# Patient Record
Sex: FEMALE | Race: WHITE | NOT HISPANIC OR LATINO | Employment: FULL TIME | URBAN - NONMETROPOLITAN AREA
[De-identification: names, ages, dates, MRNs, and addresses within clinical notes are randomized per-mention and may not be internally consistent; named-entity substitution may affect disease eponyms.]

---

## 2018-09-17 ENCOUNTER — HOSPITAL ENCOUNTER (EMERGENCY)
Facility: HOSPITAL | Age: 23
Discharge: HOME | End: 2018-09-17
Attending: EMERGENCY MEDICINE | Admitting: EMERGENCY MEDICINE

## 2018-09-17 VITALS
OXYGEN SATURATION: 96 % | RESPIRATION RATE: 16 BRPM | SYSTOLIC BLOOD PRESSURE: 169 MMHG | DIASTOLIC BLOOD PRESSURE: 95 MMHG | TEMPERATURE: 99.6 F | WEIGHT: 293 LBS | HEART RATE: 112 BPM | BODY MASS INDEX: 48.82 KG/M2 | HEIGHT: 65 IN

## 2018-09-17 DIAGNOSIS — J10.1 INFLUENZA A: Primary | ICD-10-CM

## 2018-09-17 LAB
BACTERIA UR QL AUTO: ABNORMAL /HPF
BILIRUB UR QL STRIP: NEGATIVE
CLARITY UR: ABNORMAL
COLOR UR: YELLOW
FLUAV AG NPH QL: POSITIVE
FLUBV AG NPH QL IA: NEGATIVE
GLUCOSE UR STRIP-MCNC: NEGATIVE MG/DL
HGB UR QL STRIP.AUTO: ABNORMAL
HYALINE CASTS UR QL AUTO: ABNORMAL /LPF
KETONES UR QL STRIP: NEGATIVE
LEUKOCYTE ESTERASE UR QL STRIP.AUTO: ABNORMAL
MUCOUS THREADS URNS QL MICRO: ABNORMAL /HPF
NITRITE UR QL STRIP: NEGATIVE
PH UR STRIP.AUTO: 5.5 [PH] (ref 5–8)
PROT UR QL STRIP: ABNORMAL
RBC # UR: ABNORMAL /HPF
REF LAB TEST METHOD: ABNORMAL
S PYO AG THROAT QL: NEGATIVE
SP GR UR STRIP: >=1.03 (ref 1–1.03)
SQUAMOUS #/AREA URNS HPF: ABNORMAL /HPF
TRICHOMONAS #/AREA URNS HPF: ABNORMAL /HPF
UROBILINOGEN UR QL STRIP: ABNORMAL
WBC UR QL AUTO: ABNORMAL /HPF

## 2018-09-17 PROCEDURE — 87880 STREP A ASSAY W/OPTIC: CPT | Performed by: NURSE PRACTITIONER

## 2018-09-17 PROCEDURE — 87804 INFLUENZA ASSAY W/OPTIC: CPT | Performed by: NURSE PRACTITIONER

## 2018-09-17 PROCEDURE — 87081 CULTURE SCREEN ONLY: CPT | Performed by: NURSE PRACTITIONER

## 2018-09-17 PROCEDURE — 81001 URINALYSIS AUTO W/SCOPE: CPT | Performed by: NURSE PRACTITIONER

## 2018-09-17 PROCEDURE — 99283 EMERGENCY DEPT VISIT LOW MDM: CPT

## 2018-09-17 RX ORDER — ACETAMINOPHEN 325 MG/1
975 TABLET ORAL ONCE
Status: COMPLETED | OUTPATIENT
Start: 2018-09-17 | End: 2018-09-17

## 2018-09-17 RX ORDER — ONDANSETRON 4 MG/1
4 TABLET, ORALLY DISINTEGRATING ORAL EVERY 6 HOURS PRN
Qty: 20 TABLET | Refills: 0 | OUTPATIENT
Start: 2018-09-17 | End: 2020-02-24

## 2018-09-17 RX ORDER — HYDROXYZINE HYDROCHLORIDE 10 MG/1
25 TABLET, FILM COATED ORAL 3 TIMES DAILY
COMMUNITY
End: 2020-02-24

## 2018-09-17 RX ORDER — BUSPIRONE HYDROCHLORIDE 10 MG/1
10 TABLET ORAL 3 TIMES DAILY
COMMUNITY
End: 2020-11-15

## 2018-09-17 RX ORDER — AMLODIPINE BESYLATE 10 MG/1
10 TABLET ORAL DAILY
COMMUNITY
End: 2021-04-20 | Stop reason: SDUPTHER

## 2018-09-17 RX ORDER — OSELTAMIVIR PHOSPHATE 75 MG/1
75 CAPSULE ORAL 2 TIMES DAILY
Qty: 10 CAPSULE | Refills: 0 | OUTPATIENT
Start: 2018-09-17 | End: 2020-02-24

## 2018-09-17 RX ORDER — ONDANSETRON 4 MG/1
4 TABLET, ORALLY DISINTEGRATING ORAL ONCE
Status: COMPLETED | OUTPATIENT
Start: 2018-09-17 | End: 2018-09-17

## 2018-09-17 RX ADMIN — ACETAMINOPHEN 975 MG: 325 TABLET, FILM COATED ORAL at 14:28

## 2018-09-17 RX ADMIN — ONDANSETRON 4 MG: 4 TABLET, ORALLY DISINTEGRATING ORAL at 14:27

## 2018-09-18 NOTE — ED PROVIDER NOTES
Subjective   History of Present Illness  Presents with fever, chills, body aches, cough, nausea for the past two days. Just returned from [place]. Significant other has the same symptoms. Denies chest pain or shortness of breath. No vomiting or diarrhea.  Review of Systems   All other systems reviewed and are negative.      Past Medical History:   Diagnosis Date   • Anxiety    • Depression    • Hypertension        No Known Allergies    Past Surgical History:   Procedure Laterality Date   • CHOLECYSTECTOMY     • DENTAL PROCEDURE     • TONSILLECTOMY         History reviewed. No pertinent family history.    Social History     Social History   • Marital status: Single     Social History Main Topics   • Smoking status: Never Smoker   • Alcohol use Yes      Comment: social   • Drug use: No     Other Topics Concern   • Not on file           Objective   Physical Exam   Constitutional: She is oriented to person, place, and time.   Morbidly obese, in no acute distress   HENT:   Head: Normocephalic and atraumatic.   Nares red and swollen, no sinus pain, posterior pharynx is red with clear drainage.   Eyes: Pupils are equal, round, and reactive to light. Conjunctivae and EOM are normal.   Neck: Normal range of motion. Neck supple.   Cardiovascular: Normal rate.    Pulmonary/Chest: Effort normal and breath sounds normal. No respiratory distress. She has no wheezes. She has no rales. She exhibits no tenderness.   Abdominal: Soft. Bowel sounds are normal.   Musculoskeletal: Normal range of motion.   Neurological: She is alert and oriented to person, place, and time.   Skin: Skin is warm and dry. Capillary refill takes less than 2 seconds.   Psychiatric: She has a normal mood and affect. Her behavior is normal. Judgment and thought content normal.   Nursing note and vitals reviewed.      Procedures           ED Course      Given Tylenol and Zofran. Influenza A is positive. Discharged with Zofran and Tamiflu. No work until fever free  for 24 hours. Force fluids. Tylenol and Motrin as needed for fever and pain.            [facility]      Final diagnoses:   Influenza A            Christina Wright, APRN  09/18/18 [id number]

## 2018-09-19 LAB — BACTERIA SPEC AEROBE CULT: NORMAL

## 2019-08-30 ENCOUNTER — HOSPITAL ENCOUNTER (EMERGENCY)
Facility: HOSPITAL | Age: 24
Discharge: HOME | End: 2019-08-30
Attending: EMERGENCY MEDICINE | Admitting: EMERGENCY MEDICINE

## 2019-08-30 ENCOUNTER — APPOINTMENT (OUTPATIENT)
Dept: GENERAL RADIOLOGY | Facility: HOSPITAL | Age: 24
End: 2019-08-30

## 2019-08-30 VITALS
HEIGHT: 65 IN | TEMPERATURE: 97.5 F | OXYGEN SATURATION: 100 % | SYSTOLIC BLOOD PRESSURE: 174 MMHG | WEIGHT: 281 LBS | RESPIRATION RATE: 16 BRPM | DIASTOLIC BLOOD PRESSURE: 105 MMHG | BODY MASS INDEX: 46.82 KG/M2 | HEART RATE: 98 BPM

## 2019-08-30 DIAGNOSIS — M25.512 ACUTE PAIN OF LEFT SHOULDER: ICD-10-CM

## 2019-08-30 DIAGNOSIS — S23.9XXA THORACIC SPRAIN: ICD-10-CM
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
## 2019-08-30 PROCEDURE — 73030 X-RAY EXAM OF SHOULDER: CPT

## 2019-08-30 PROCEDURE — 72070 X-RAY EXAM THORAC SPINE 2VWS: CPT

## 2019-08-30 PROCEDURE — 99283 EMERGENCY DEPT VISIT LOW MDM: CPT

## 2019-08-30 RX ORDER — CYCLOBENZAPRINE HCL 10 MG
10 TABLET ORAL ONCE
Status: COMPLETED | OUTPATIENT
Start: 2019-08-30 | End: 2019-08-30

## 2019-08-30 RX ORDER — IBUPROFEN 800 MG/1
800 TABLET ORAL ONCE
Status: COMPLETED | OUTPATIENT
Start: 2019-08-30 | End: 2019-08-30

## 2019-08-30 RX ORDER — METOPROLOL TARTRATE 50 MG/1
50 TABLET, FILM COATED ORAL DAILY
COMMUNITY
End: 2021-04-20

## 2019-08-30 RX ORDER — CYCLOBENZAPRINE HCL 10 MG
10 TABLET ORAL 3 TIMES DAILY PRN
Qty: 15 TABLET | Refills: 0 | Status: SHIPPED | OUTPATIENT
Start: 2019-08-30 | End: 2020-11-15

## 2019-08-30 RX ADMIN — CYCLOBENZAPRINE HYDROCHLORIDE 10 MG: 10 TABLET, FILM COATED ORAL at 20:19

## 2019-08-30 RX ADMIN — IBUPROFEN 800 MG: 800 TABLET ORAL at 20:19

## 2019-10-13 ENCOUNTER — HOSPITAL ENCOUNTER (EMERGENCY)
Facility: HOSPITAL | Age: 24
Discharge: HOME | End: 2019-10-13
Attending: EMERGENCY MEDICINE | Admitting: EMERGENCY MEDICINE

## 2019-10-13 VITALS
RESPIRATION RATE: 18 BRPM | TEMPERATURE: 98.6 F | BODY MASS INDEX: 45.35 KG/M2 | HEART RATE: 88 BPM | DIASTOLIC BLOOD PRESSURE: 97 MMHG | SYSTOLIC BLOOD PRESSURE: 144 MMHG | OXYGEN SATURATION: 98 % | WEIGHT: 272.2 LBS | HEIGHT: 65 IN

## 2019-10-13 DIAGNOSIS — S05.01XA ABRASION OF RIGHT CORNEA, INITIAL ENCOUNTER: Primary | ICD-10-CM

## 2019-10-13 PROCEDURE — 99283 EMERGENCY DEPT VISIT LOW MDM: CPT

## 2019-10-13 RX ORDER — IBUPROFEN 800 MG/1
800 TABLET ORAL EVERY 8 HOURS PRN
Qty: 60 TABLET | Refills: 0 | Status: SHIPPED | OUTPATIENT
Start: 2019-10-13 | End: 2020-11-15 | Stop reason: SDUPTHER

## 2019-10-13 RX ORDER — MONTELUKAST SODIUM 10 MG/1
10 TABLET ORAL NIGHTLY
COMMUNITY
End: 2021-04-20 | Stop reason: SDUPTHER

## 2019-10-13 RX ORDER — MOXIFLOXACIN 5 MG/ML
1 SOLUTION/ DROPS OPHTHALMIC 3 TIMES DAILY
COMMUNITY
End: 2020-02-24

## 2019-10-13 RX ORDER — IBUPROFEN 800 MG/1
800 TABLET ORAL ONCE
Status: COMPLETED | OUTPATIENT
Start: 2019-10-13 | End: 2019-10-13

## 2019-10-13 RX ORDER — TETRACAINE HYDROCHLORIDE 5 MG/ML
2 SOLUTION OPHTHALMIC ONCE
Status: COMPLETED | OUTPATIENT
Start: 2019-10-13 | End: 2019-10-13

## 2019-10-13 RX ORDER — HYDROCODONE BITARTRATE AND ACETAMINOPHEN 5; 325 MG/1; MG/1
1 TABLET ORAL ONCE
Status: COMPLETED | OUTPATIENT
Start: 2019-10-13 | End: 2019-10-13

## 2019-10-13 RX ORDER — HYDROCODONE BITARTRATE AND ACETAMINOPHEN 5; 325 MG/1; MG/1
1 TABLET ORAL EVERY 6 HOURS PRN
Qty: 6 TABLET | Refills: 0 | Status: SHIPPED | OUTPATIENT
Start: 2019-10-13 | End: 2020-11-15

## 2019-10-13 RX ORDER — CETIRIZINE HYDROCHLORIDE 10 MG/1
10 TABLET ORAL DAILY
COMMUNITY
End: 2021-04-20 | Stop reason: SDUPTHER

## 2019-10-13 RX ADMIN — FLUORESCEIN SODIUM 1 STRIP: 1 STRIP OPHTHALMIC at 00:59

## 2019-10-13 RX ADMIN — TETRACAINE HYDROCHLORIDE 2 DROP: 5 SOLUTION OPHTHALMIC at 00:59

## 2019-10-13 RX ADMIN — IBUPROFEN 800 MG: 800 TABLET ORAL at 01:13

## 2019-10-13 RX ADMIN — HYDROCODONE BITARTRATE AND ACETAMINOPHEN 1 TABLET: 5; 325 TABLET ORAL at 01:13

## 2019-10-13 NOTE — ED PROVIDER NOTES
Subjective   24-year-old female presenting with eye pain.  She states that a few days ago were contacted for the first time in a long time.  After taking them out she had pain in her right eye.  She was seen by her ophthalmologist yesterday and diagnosed with a corneal abrasion.  She continues to have pain tonight so came in for evaluation.  No vision loss, nausea, vomiting or other complaints.  She was given moxifloxacin and has been using it as prescribed.            Review of Systems   Constitutional: Negative.    HENT: Negative.    Eyes: Positive for pain, discharge and redness. Negative for photophobia and visual disturbance.   Respiratory: Negative.    Cardiovascular: Negative.    Gastrointestinal: Negative.    Genitourinary: Negative.    Musculoskeletal: Negative.    Skin: Negative.    Neurological: Negative.    Psychiatric/Behavioral: Negative.        Past Medical History:   Diagnosis Date   • Anxiety    • Depression    • Hypertension        No Known Allergies    Past Surgical History:   Procedure Laterality Date   • CHOLECYSTECTOMY     • DENTAL PROCEDURE     • TONSILLECTOMY         History reviewed. No pertinent family history.    Social History     Socioeconomic History   • Marital status: Single     Spouse name: Not on file   • Number of children: Not on file   • Years of education: Not on file   • Highest education level: Not on file   Tobacco Use   • Smoking status: Never Smoker   Substance and Sexual Activity   • Alcohol use: Yes     Comment: social   • Drug use: No           Objective   Physical Exam   Constitutional: She is oriented to person, place, and time. She appears well-developed and well-nourished. No distress.   HENT:   Head: Normocephalic and atraumatic.   Right Ear: External ear normal.   Left Ear: External ear normal.   Nose: Nose normal.   Mouth/Throat: Oropharynx is clear and moist.   Eyes: EOM are normal. Pupils are equal, round, and reactive to light.   Mild conjunctival injection on  the right, large corneal abrasion noted over the midpoint of the pupil, Jaja negative, no dendritic lesions or ulcerative appearing lesions, immediate relief of pain with instillation of tetracaine, left eye normal   Neck: Normal range of motion. Neck supple.   Cardiovascular: Normal rate, regular rhythm, normal heart sounds and intact distal pulses.   Pulmonary/Chest: Effort normal and breath sounds normal. No respiratory distress.   Abdominal: Soft. Bowel sounds are normal. She exhibits no distension. There is no tenderness. There is no rebound and no guarding.   Musculoskeletal: Normal range of motion. She exhibits no edema, tenderness or deformity.   Neurological: She is alert and oriented to person, place, and time.   Skin: Skin is warm and dry. No rash noted.   Psychiatric: She has a normal mood and affect. Her behavior is normal.   Nursing note and vitals reviewed.      Procedures           ED Course              MDM  Number of Diagnoses or Management Options  Abrasion of right cornea, initial encounter:   Diagnosis management comments: 22-year-old female with eye pain, corneal abrasion.  Well-developed, well-nourished obese young lady in no distress with exam as above.  Exam is consistent with a large centrally located corneal abrasion.  No other concerning findings on exam. Will give oral analgesia and recommended keeping her appointment with her ophthalmologist. Encouraged her to keep using the antibiotics as prescribed. She is comfortable with and understanding of the plan.    DDX: corneal abrasion       Final diagnoses:   Abrasion of right cornea, initial encounter              Negro Calderon MD  10/13/19 0110

## 2020-11-15 ENCOUNTER — HOSPITAL ENCOUNTER (EMERGENCY)
Facility: HOSPITAL | Age: 25
Discharge: HOME | End: 2020-11-15
Attending: STUDENT IN AN ORGANIZED HEALTH CARE EDUCATION/TRAINING PROGRAM | Admitting: STUDENT IN AN ORGANIZED HEALTH CARE EDUCATION/TRAINING PROGRAM

## 2020-11-15 ENCOUNTER — APPOINTMENT (OUTPATIENT)
Dept: GENERAL RADIOLOGY | Facility: HOSPITAL | Age: 25
End: 2020-11-15

## 2020-11-15 VITALS
SYSTOLIC BLOOD PRESSURE: 131 MMHG | HEART RATE: 74 BPM | RESPIRATION RATE: 20 BRPM | DIASTOLIC BLOOD PRESSURE: 92 MMHG | HEIGHT: 65 IN | BODY MASS INDEX: 41.48 KG/M2 | TEMPERATURE: 98 F | WEIGHT: 249 LBS | OXYGEN SATURATION: 100 %

## 2020-11-15 DIAGNOSIS — S93.401A SPRAIN OF RIGHT ANKLE, UNSPECIFIED LIGAMENT, INITIAL ENCOUNTER: Primary | ICD-10-CM

## 2020-11-15 PROCEDURE — 99283 EMERGENCY DEPT VISIT LOW MDM: CPT

## 2020-11-15 PROCEDURE — 73630 X-RAY EXAM OF FOOT: CPT

## 2020-11-15 PROCEDURE — 73610 X-RAY EXAM OF ANKLE: CPT

## 2020-11-15 RX ORDER — FLUOXETINE HYDROCHLORIDE 20 MG/1
20 CAPSULE ORAL DAILY
COMMUNITY
End: 2021-04-20 | Stop reason: ALTCHOICE

## 2020-11-15 RX ORDER — IBUPROFEN 800 MG/1
800 TABLET ORAL EVERY 8 HOURS PRN
Qty: 60 TABLET | Refills: 0 | Status: SHIPPED | OUTPATIENT
Start: 2020-11-15 | End: 2022-05-06 | Stop reason: HOSPADM

## 2020-11-15 RX ORDER — SUMATRIPTAN 25 MG/1
25 TABLET, FILM COATED ORAL
COMMUNITY
End: 2020-12-22

## 2020-11-15 RX ORDER — HYDROCODONE BITARTRATE AND ACETAMINOPHEN 7.5; 325 MG/1; MG/1
1 TABLET ORAL ONCE
Status: COMPLETED | OUTPATIENT
Start: 2020-11-15 | End: 2020-11-15

## 2020-11-15 RX ORDER — HYDROXYZINE PAMOATE 25 MG/1
25 CAPSULE ORAL 3 TIMES DAILY PRN
COMMUNITY
End: 2021-04-20 | Stop reason: SDUPTHER

## 2020-11-15 RX ADMIN — HYDROCODONE BITARTRATE AND ACETAMINOPHEN 1 TABLET: 7.5; 325 TABLET ORAL at 18:07

## 2020-11-15 NOTE — ED PROVIDER NOTES
Subjective   History of Present Illness  This is a 25-year-old female comes in today complaining of right ankle pain.  She states she stepped off the porch and twisted her right ankle.  She denies any other injuries.  She is unable to bear weight.  Review of Systems   Constitutional: Negative.    HENT: Negative.    Eyes: Negative.    Respiratory: Negative.    Cardiovascular: Negative.    Gastrointestinal: Negative.    Genitourinary: Negative.    Musculoskeletal: Positive for arthralgias and joint swelling.   Skin: Negative.    Neurological: Negative.    Psychiatric/Behavioral: Negative.        Past Medical History:   Diagnosis Date   • Anxiety    • Depression    • Hypertension        Allergies   Allergen Reactions   • Topamax [Topiramate] Rash     Rash, tingling and numnbess in bilateral feet.        Past Surgical History:   Procedure Laterality Date   • CHOLECYSTECTOMY     • DENTAL PROCEDURE     • TONSILLECTOMY         History reviewed. No pertinent family history.    Social History     Socioeconomic History   • Marital status: Single     Spouse name: Not on file   • Number of children: Not on file   • Years of education: Not on file   • Highest education level: Not on file   Tobacco Use   • Smoking status: Never Smoker   Substance and Sexual Activity   • Alcohol use: Yes     Comment: social   • Drug use: No           Objective   Physical Exam  Vitals signs and nursing note reviewed.   Constitutional:       Appearance: Normal appearance. She is obese.   Neurological:      Mental Status: She is alert.     GEN: No acute distress  Head: Normocephalic, atraumatic  Eyes: Pupils equal round reactive to light  ENT: Posterior pharynx normal in appearance, oral mucosa is moist  Chest: Nontender to palpation  Cardiovascular: Regular rate  Lungs: Clear to auscultation bilaterally  Abdomen: Soft, nontender, nondistended, no peritoneal signs  Extremities: No edema, normal appearance, tender right ankle, limited range of motion  due to pain.  Neuro: GCS 15  Psych: Mood and affect are appropriate      Procedures           ED Course                                           MDM  Number of Diagnoses or Management Options     Amount and/or Complexity of Data Reviewed  Tests in the radiology section of CPT®: ordered and reviewed  Review and summarize past medical records: yes  Discuss the patient with other providers: yes  Independent visualization of images, tracings, or specimens: yes    Risk of Complications, Morbidity, and/or Mortality  Presenting problems: low  Diagnostic procedures: low  Management options: low        Final diagnoses:   Sprain of right ankle, unspecified ligament, initial encounter            Paula Ozuna, APRN  11/15/20 1811

## 2020-12-22 ENCOUNTER — PROCEDURE VISIT (OUTPATIENT)
Dept: OBSTETRICS AND GYNECOLOGY | Facility: CLINIC | Age: 25
End: 2020-12-22

## 2020-12-22 VITALS
SYSTOLIC BLOOD PRESSURE: 110 MMHG | BODY MASS INDEX: 43.49 KG/M2 | DIASTOLIC BLOOD PRESSURE: 68 MMHG | WEIGHT: 261 LBS | HEIGHT: 65 IN

## 2020-12-22 DIAGNOSIS — Z12.4 SCREENING FOR CERVICAL CANCER: ICD-10-CM

## 2020-12-22 DIAGNOSIS — Z01.419 ENCOUNTER FOR GYNECOLOGICAL EXAMINATION WITHOUT ABNORMAL FINDING: Primary | ICD-10-CM

## 2020-12-22 DIAGNOSIS — Z30.011 ENCOUNTER FOR INITIAL PRESCRIPTION OF CONTRACEPTIVE PILLS: ICD-10-CM

## 2020-12-22 PROCEDURE — 99385 PREV VISIT NEW AGE 18-39: CPT | Performed by: PHYSICIAN ASSISTANT

## 2020-12-22 NOTE — PATIENT INSTRUCTIONS
Encourage self breast exam monthly  Regular exercise  Discussed Kyleena IUD. Patient may call in the future if she decides to switch from ocp

## 2020-12-22 NOTE — PROGRESS NOTES
Subjective   Chief Complaint   Patient presents with   • Gynecologic Exam     Patient is here for annual and pap smear       Valery Hernandez is a 25 y.o. year old new patient  presenting to be seen for her annual gynecological exam.   She has no complaints or concerns.  She is currently using Tri-Sprintec OCPs and desires refills for now.  She has been considering alternative birth control and is interested in an IUD but is not wanting to change right now. She is hypertensive and well controlled on medication.   She has regular monthly bleeds on her Tri-Sprintec and ..Patient's last menstrual period was 2020 (approximate).  She declines STI screening  Sexually active with one female partner and monogamous.    Past Medical History:   Diagnosis Date   • Anxiety    • Depression    • Hypertension    • Polycystic ovary syndrome    • Urinary tract infection         Current Outpatient Medications:   •  amLODIPine (NORVASC) 10 MG tablet, Take 10 mg by mouth Daily., Disp: , Rfl:   •  cetirizine (zyrTEC) 10 MG tablet, Take 10 mg by mouth Daily., Disp: , Rfl:   •  FLUoxetine (PROzac) 20 MG capsule, Take 20 mg by mouth Daily., Disp: , Rfl:   •  hydrOXYzine pamoate (VISTARIL) 25 MG capsule, Take 25 mg by mouth 3 (Three) Times a Day As Needed for Itching., Disp: , Rfl:   •  ibuprofen (ADVIL,MOTRIN) 800 MG tablet, Take 1 tablet by mouth Every 8 (Eight) Hours As Needed for Mild Pain  or Moderate Pain ., Disp: 60 tablet, Rfl: 0  •  metoprolol tartrate (LOPRESSOR) 50 MG tablet, Take 50 mg by mouth Daily., Disp: , Rfl:   •  montelukast (SINGULAIR) 10 MG tablet, Take 10 mg by mouth Every Night., Disp: , Rfl:   •  traZODone (DESYREL) 100 MG tablet, Take 100 mg by mouth every night at bedtime., Disp: , Rfl:   •  TRI-SPRINTEC 0.18/0.215/0.25 MG-35 MCG per tablet, Take 1 tablet by mouth Daily., Disp: , Rfl:    Allergies   Allergen Reactions   • Topamax [Topiramate] Rash     Rash, tingling and numnbess in bilateral feet.      "  Past Surgical History:   Procedure Laterality Date   • CHOLECYSTECTOMY     • DENTAL PROCEDURE     • TONSILLECTOMY        Social History     Socioeconomic History   • Marital status: Single     Spouse name: Not on file   • Number of children: Not on file   • Years of education: Not on file   • Highest education level: Not on file   Social Needs   • Financial resource strain: Not hard at all   • Food insecurity     Worry: Never true     Inability: Not on file   • Transportation needs     Medical: No     Non-medical: No   Tobacco Use   • Smoking status: Never Smoker   • Smokeless tobacco: Never Used   Substance and Sexual Activity   • Alcohol use: Yes     Comment: social   • Drug use: No   • Sexual activity: Yes     Partners: Female     Birth control/protection: OCP   Lifestyle   • Physical activity     Days per week: 0 days     Minutes per session: 0 min   • Stress: Only a little      Family History   Problem Relation Age of Onset   • Hyperlipidemia Father    • Hypertension Paternal Grandfather    • Heart disease Paternal Grandmother    • Hyperlipidemia Paternal Grandmother        Review of Systems   Constitutional: Negative for chills, diaphoresis and fever.        Weight gain   Gastrointestinal: Negative for constipation, diarrhea, nausea and vomiting.   Musculoskeletal: Positive for arthralgias.   Psychiatric/Behavioral: Positive for sleep disturbance.        Mood swings   All other systems reviewed and are negative.          Objective   /68   Ht 165.1 cm (65\")   Wt 118 kg (261 lb)   LMP 12/08/2020 (Approximate)   Breastfeeding No   BMI 43.43 kg/m²     Physical Exam  Constitutional:       Appearance: Normal appearance. She is well-groomed. She is obese.   Eyes:      General: Lids are normal.      Extraocular Movements: Extraocular movements intact.      Conjunctiva/sclera: Conjunctivae normal.   Chest:      Breasts: Breasts are symmetrical.         Right: No inverted nipple, mass, nipple discharge, " skin change or tenderness.         Left: No inverted nipple, mass, nipple discharge, skin change or tenderness.   Abdominal:      General: There is no distension.      Palpations: Abdomen is soft.      Tenderness: There is no abdominal tenderness. There is no guarding.   Genitourinary:     Labia:         Right: No rash, tenderness or lesion.         Left: No rash, tenderness or lesion.       Urethra: No prolapse, urethral pain, urethral swelling or urethral lesion.      Vagina: No vaginal discharge, erythema, tenderness, bleeding or lesions.      Cervix: No cervical motion tenderness, discharge, friability, lesion, erythema, cervical bleeding or eversion.      Uterus: Not enlarged and not tender.       Adnexa:         Right: No mass, tenderness or fullness.          Left: No mass, tenderness or fullness.        Comments: Pap done  Musculoskeletal: Normal range of motion.   Skin:     General: Skin is warm and dry.      Findings: No lesion or rash.   Neurological:      Mental Status: She is alert and oriented to person, place, and time.   Psychiatric:         Attention and Perception: Attention normal.         Mood and Affect: Mood normal.         Speech: Speech normal.         Behavior: Behavior is cooperative.         Thought Content: Thought content normal.              Assessment and Plan  Diagnoses and all orders for this visit:    1. Encounter for gynecological examination without abnormal finding (Primary)    2. Screening for cervical cancer  -     Liquid-based Pap Smear, Screening; Future    3. Encounter for initial prescription of contraceptive pills      Patient Instructions   Encourage self breast exam monthly  Regular exercise  Discussed Kyleena IUD. Patient may call in the future if she decides to switch from ocp             This note was electronically signed.    Tori Hernandes PA-C   December 22, 2020

## 2021-01-05 DIAGNOSIS — Z12.4 SCREENING FOR CERVICAL CANCER: ICD-10-CM

## 2021-01-05 RX ORDER — METRONIDAZOLE 500 MG/1
500 TABLET ORAL 2 TIMES DAILY
Qty: 14 TABLET | Refills: 0 | Status: SHIPPED | OUTPATIENT
Start: 2021-01-05 | End: 2021-01-12

## 2021-04-20 ENCOUNTER — OFFICE VISIT (OUTPATIENT)
Dept: INTERNAL MEDICINE | Facility: CLINIC | Age: 26
End: 2021-04-20

## 2021-04-20 VITALS
HEART RATE: 88 BPM | DIASTOLIC BLOOD PRESSURE: 84 MMHG | WEIGHT: 291 LBS | OXYGEN SATURATION: 98 % | TEMPERATURE: 97.1 F | HEIGHT: 65 IN | SYSTOLIC BLOOD PRESSURE: 122 MMHG | BODY MASS INDEX: 48.48 KG/M2

## 2021-04-20 DIAGNOSIS — Z00.00 PREVENTATIVE HEALTH CARE: ICD-10-CM

## 2021-04-20 DIAGNOSIS — E66.01 CLASS 3 SEVERE OBESITY DUE TO EXCESS CALORIES WITH SERIOUS COMORBIDITY AND BODY MASS INDEX (BMI) OF 45.0 TO 49.9 IN ADULT (HCC): ICD-10-CM

## 2021-04-20 DIAGNOSIS — I10 ESSENTIAL HYPERTENSION: ICD-10-CM

## 2021-04-20 DIAGNOSIS — R73.03 PREDIABETES: ICD-10-CM

## 2021-04-20 DIAGNOSIS — A04.8 H. PYLORI INFECTION: ICD-10-CM

## 2021-04-20 DIAGNOSIS — Z00.00 ANNUAL PHYSICAL EXAM: ICD-10-CM

## 2021-04-20 DIAGNOSIS — K21.9 GASTROESOPHAGEAL REFLUX DISEASE, UNSPECIFIED WHETHER ESOPHAGITIS PRESENT: ICD-10-CM

## 2021-04-20 DIAGNOSIS — Z11.59 NEED FOR HEPATITIS C SCREENING TEST: ICD-10-CM

## 2021-04-20 DIAGNOSIS — R63.5 WEIGHT GAIN: ICD-10-CM

## 2021-04-20 DIAGNOSIS — F41.8 DEPRESSION WITH ANXIETY: ICD-10-CM

## 2021-04-20 DIAGNOSIS — R53.83 FATIGUE, UNSPECIFIED TYPE: ICD-10-CM

## 2021-04-20 DIAGNOSIS — Z76.89 ENCOUNTER TO ESTABLISH CARE: Primary | ICD-10-CM

## 2021-04-20 DIAGNOSIS — E28.2 PCOS (POLYCYSTIC OVARIAN SYNDROME): ICD-10-CM

## 2021-04-20 PROCEDURE — 99204 OFFICE O/P NEW MOD 45 MIN: CPT | Performed by: PHYSICIAN ASSISTANT

## 2021-04-20 RX ORDER — MONTELUKAST SODIUM 10 MG/1
10 TABLET ORAL NIGHTLY
Qty: 90 TABLET | Refills: 3 | Status: SHIPPED | OUTPATIENT
Start: 2021-04-20 | End: 2022-01-20 | Stop reason: SDUPTHER

## 2021-04-20 RX ORDER — CETIRIZINE HYDROCHLORIDE 10 MG/1
10 TABLET ORAL NIGHTLY
Qty: 90 TABLET | Refills: 3 | Status: SHIPPED | OUTPATIENT
Start: 2021-04-20 | End: 2022-01-20 | Stop reason: SDUPTHER

## 2021-04-20 RX ORDER — AMLODIPINE BESYLATE 10 MG/1
10 TABLET ORAL DAILY
Qty: 90 TABLET | Refills: 3 | Status: SHIPPED | OUTPATIENT
Start: 2021-04-20 | End: 2022-01-20 | Stop reason: SDUPTHER

## 2021-04-20 RX ORDER — FLUTICASONE PROPIONATE 50 MCG
2 SPRAY, SUSPENSION (ML) NASAL DAILY
Qty: 18.2 ML | Refills: 11 | Status: SHIPPED | OUTPATIENT
Start: 2021-04-20 | End: 2022-01-20 | Stop reason: SDUPTHER

## 2021-04-20 RX ORDER — TRAZODONE HYDROCHLORIDE 150 MG/1
150 TABLET ORAL NIGHTLY
Qty: 90 TABLET | Refills: 3 | Status: SHIPPED | OUTPATIENT
Start: 2021-04-20 | End: 2022-01-20 | Stop reason: SDUPTHER

## 2021-04-20 RX ORDER — HYDROXYZINE PAMOATE 25 MG/1
25 CAPSULE ORAL NIGHTLY PRN
Qty: 90 CAPSULE | Refills: 3 | Status: SHIPPED | OUTPATIENT
Start: 2021-04-20 | End: 2021-06-24 | Stop reason: SDUPTHER

## 2021-04-20 RX ORDER — FAMOTIDINE 40 MG/1
40 TABLET, FILM COATED ORAL 2 TIMES DAILY PRN
Qty: 180 TABLET | Refills: 3 | Status: SHIPPED | OUTPATIENT
Start: 2021-04-20 | End: 2022-01-20 | Stop reason: SDUPTHER

## 2021-04-20 RX ORDER — DULOXETIN HYDROCHLORIDE 30 MG/1
30 CAPSULE, DELAYED RELEASE ORAL DAILY
Qty: 30 CAPSULE | Refills: 2 | Status: SHIPPED | OUTPATIENT
Start: 2021-04-20 | End: 2021-05-24 | Stop reason: SDUPTHER

## 2021-04-20 RX ORDER — METOPROLOL SUCCINATE 100 MG/1
100 TABLET, EXTENDED RELEASE ORAL DAILY
Qty: 90 TABLET | Refills: 3 | Status: SHIPPED | OUTPATIENT
Start: 2021-04-20 | End: 2022-01-20 | Stop reason: SDUPTHER

## 2021-04-20 RX ORDER — TRAZODONE HYDROCHLORIDE 150 MG/1
TABLET ORAL
COMMUNITY
Start: 2021-03-30 | End: 2021-04-20 | Stop reason: SDUPTHER

## 2021-04-20 RX ORDER — METOPROLOL SUCCINATE 100 MG/1
TABLET, EXTENDED RELEASE ORAL
COMMUNITY
Start: 2021-03-30 | End: 2021-04-20 | Stop reason: SDUPTHER

## 2021-04-20 RX ORDER — NORGESTIMATE AND ETHINYL ESTRADIOL 7DAYSX3 28
1 KIT ORAL DAILY
Qty: 28 TABLET | Refills: 11 | Status: SHIPPED | OUTPATIENT
Start: 2021-04-20 | End: 2021-07-12

## 2021-04-20 NOTE — PROGRESS NOTES
Subjective   Valery Hernandez is a 26 y.o. female and is here to establish care for a comprehensive physical exam. The patient reports problems - depression and anxiety. She is a .    HPI: Today she reports bothersome depression and anxiety. She experienced around 4 months of very bothersome bilateral leg cramps. She was on prozac until around 2 weeks ago when she ran out of the prescription for around 1 week and noticed the leg cramps went away but she then resumed the prozac and cramps returned so she again discontinued it. Prozac was controlling depression and anxiety well. She has not tried any other antidepressant medications. Hydroxyzine mainly helps her sleep. She tried Buspar for around 1 year which was not very effective.     PCOS- had lost a lot of weight with diet and exercise but then gained most of the weight back due to Covid-19 shut down and then breaking her ankle and tearing plantar fascia (now in boot). She has never been treated for PCOS but states she was diagnosed through labs by GYN.    HTN treated with metoprolol and amlodipine. HTN diagnosed around age 21. Family history of early hypertension on her dad's side.     Migraines without aura.  Migraines no worse since starting OCP.    Frequent heartburn, sometimes for weeks straight. She has tried several OTC medications which do not control symptoms.         Health Habits:  Eye exam within last 2 years? Yes, yesterday  Dental exam every 6 months? Yes   Exercise habits: unable due to left foot and ankle problem, in boot  Healthy diet? sometimes    The ASCVD Risk score (Jewell JENNIFER Jr., et al., 2013) failed to calculate for the following reasons:    The 2013 ASCVD risk score is only valid for ages 40 to 79        Do you take any herbs or supplements that were not prescribed by a doctor? no  Are you taking calcium supplements? No  Are you taking aspirin daily? No     History:  LMP: No LMP recorded.  Menopause: No  Last pap date:  2020  Abnormal pap? no  Family history of breast or ovarian cancer: no         OB History    Para Term  AB Living   0 0 0 0 0 0   SAB TAB Ectopic Molar Multiple Live Births   0 0 0 0 0 0      reports being sexually active and has had partner(s) who are Female. She reports using the following method of birth control/protection: OCP.        The following portions of the patient's history were reviewed and updated as appropriate: She  has a past medical history of Anemia, Anxiety, Depression, GERD (gastroesophageal reflux disease), Hypertension, Migraine, Polycystic ovary syndrome, and Urinary tract infection.  She does not have any pertinent problems on file.  She  has a past surgical history that includes Dental surgery; Cholecystectomy; and Tonsillectomy.  Her family history includes Arthritis in an other family member; Diabetes in an other family member; Heart attack in an other family member; Heart disease in her paternal grandmother; Hyperlipidemia in her father, paternal grandmother, and another family member; Hypertension in her father, paternal grandfather, and another family member; Obesity in an other family member.  She  reports that she has never smoked. She has never used smokeless tobacco. She reports current alcohol use. She reports that she does not use drugs.  Current Outpatient Medications   Medication Sig Dispense Refill   • amLODIPine (NORVASC) 10 MG tablet Take 1 tablet by mouth Daily. 90 tablet 3   • cetirizine (zyrTEC) 10 MG tablet Take 1 tablet by mouth Every Night. 90 tablet 3   • hydrOXYzine pamoate (VISTARIL) 25 MG capsule Take 1 capsule by mouth At Night As Needed for Anxiety (sleep). 90 capsule 3   • ibuprofen (ADVIL,MOTRIN) 800 MG tablet Take 1 tablet by mouth Every 8 (Eight) Hours As Needed for Mild Pain  or Moderate Pain . 60 tablet 0   • metoprolol succinate XL (TOPROL-XL) 100 MG 24 hr tablet Take 1 tablet by mouth Daily. 90 tablet 3   • montelukast (SINGULAIR) 10  MG tablet Take 1 tablet by mouth Every Night. 90 tablet 3   • traZODone (DESYREL) 150 MG tablet Take 1 tablet by mouth Every Night. 90 tablet 3   • Tri-Sprintec 0.18/0.215/0.25 MG-35 MCG per tablet Take 1 tablet by mouth Daily. 28 tablet 11   • DULoxetine (CYMBALTA) 30 MG capsule Take 1 capsule by mouth Daily. 30 capsule 2   • famotidine (Pepcid) 40 MG tablet Take 1 tablet by mouth 2 (Two) Times a Day As Needed for Indigestion or Heartburn. 180 tablet 3   • fluticasone (Flonase) 50 MCG/ACT nasal spray 2 sprays into the nostril(s) as directed by provider Daily. 18.2 mL 11     No current facility-administered medications for this visit.       Review of Systems  Do you have pain that bothers you in your daily life? yes, current foot issue.    Review of Systems   Constitutional: Positive for fatigue and unexpected weight gain. Negative for activity change, appetite change, chills, diaphoresis, fever and unexpected weight loss.   HENT: Negative for congestion, dental problem, ear pain, mouth sores, postnasal drip, rhinorrhea, sinus pressure, sneezing, sore throat, swollen glands, trouble swallowing and voice change.    Eyes: Negative for blurred vision, double vision, pain, discharge, redness, itching and visual disturbance.   Respiratory: Negative for cough, choking, chest tightness, shortness of breath and wheezing.    Cardiovascular: Positive for leg swelling (left, injury related). Negative for chest pain and palpitations.   Gastrointestinal: Positive for indigestion (frequent). Negative for abdominal distention, abdominal pain, blood in stool, constipation, diarrhea, nausea, rectal pain, vomiting and GERD.   Endocrine: Positive for cold intolerance. Negative for heat intolerance, polydipsia, polyphagia and polyuria.   Genitourinary: Positive for menstrual problem (irregular without OCP). Negative for breast discharge, breast lump, breast pain, decreased libido, decreased urine volume, difficulty urinating, dysuria,  "flank pain, frequency, hematuria, pelvic pain, urgency, vaginal bleeding and vaginal pain.   Musculoskeletal: Positive for arthralgias (left ankle and foot). Negative for back pain, gait problem, myalgias and neck pain.   Skin: Negative for color change, rash and skin lesions.   Allergic/Immunologic: Positive for environmental allergies. Negative for immunocompromised state.   Neurological: Positive for weakness (generalized, fatigue) and headache. Negative for dizziness, tremors, facial asymmetry, speech difficulty, light-headedness, numbness, memory problem and confusion.   Hematological: Negative for adenopathy. Does not bruise/bleed easily.   Psychiatric/Behavioral: Positive for dysphoric mood, sleep disturbance and depressed mood. Negative for agitation, behavioral problems, decreased concentration, hallucinations, self-injury, suicidal ideas, negative for hyperactivity and stress. The patient is nervous/anxious.          Objective   /84   Pulse 88   Temp 97.1 °F (36.2 °C)   Ht 165.1 cm (65\")   Wt 132 kg (291 lb)   SpO2 98%   BMI 48.42 kg/m²     Physical Exam  Vitals and nursing note reviewed.   Constitutional:       General: She is not in acute distress.     Appearance: She is well-developed. She is morbidly obese. She is not diaphoretic.   HENT:      Head: Normocephalic and atraumatic.      Right Ear: Ear canal and external ear normal. There is no impacted cerumen.      Left Ear: Ear canal and external ear normal. There is no impacted cerumen.      Ears:      Comments: Bilateral TM effusions, left greater than right.  Eyes:      General: No scleral icterus.     Extraocular Movements: Extraocular movements intact.      Conjunctiva/sclera: Conjunctivae normal.      Pupils: Pupils are equal, round, and reactive to light.   Neck:      Thyroid: No thyromegaly.   Cardiovascular:      Rate and Rhythm: Normal rate and regular rhythm.      Heart sounds: Normal heart sounds. No murmur heard.   No friction " rub. No gallop.    Pulmonary:      Effort: Pulmonary effort is normal. No respiratory distress.      Breath sounds: Normal breath sounds. No wheezing, rhonchi or rales.   Chest:      Chest wall: No tenderness.   Abdominal:      General: Bowel sounds are normal. There is no distension.      Palpations: Abdomen is soft. There is no mass.      Tenderness: There is no abdominal tenderness. There is no right CVA tenderness, left CVA tenderness or rebound.   Musculoskeletal:         General: No tenderness or deformity. Normal range of motion.      Cervical back: Normal range of motion and neck supple. No rigidity or tenderness.      Right lower leg: No edema.      Left lower leg: No edema.      Comments: Left foot and lower leg in boot.   Lymphadenopathy:      Cervical: No cervical adenopathy.   Skin:     General: Skin is warm and dry.      Capillary Refill: Capillary refill takes less than 2 seconds.      Coloration: Skin is not pale.      Findings: No rash.   Neurological:      Mental Status: She is alert and oriented to person, place, and time.      Cranial Nerves: No cranial nerve deficit.      Sensory: No sensory deficit.      Deep Tendon Reflexes: Reflexes normal.   Psychiatric:         Attention and Perception: Attention and perception normal.         Mood and Affect: Mood is anxious and depressed.         Speech: Speech normal.         Behavior: Behavior normal.         Thought Content: Thought content normal.         Cognition and Memory: Cognition and memory normal.         Judgment: Judgment normal.            Assessment/Plan   Healthy female exam.       1.    Diagnosis Plan   1. Encounter to establish care     2. Annual physical exam  Lipid Panel   3. Class 3 severe obesity due to excess calories with serious comorbidity and body mass index (BMI) of 45.0 to 49.9 in adult (CMS/[facility])  Hemoglobin A1c    Patient's (Body mass index is 48.42 kg/m².) indicates that they are morbidly obese (BMI > 40 or > 35 with  obesity - related health condition) with obesity-related health conditions that include hypertension and GERD . Obesity is worsening. BMI is is above average; BMI management plan is completed. We discussed low calorie, low carb based diet program, portion control, increasing exercise and joining a fitness center or start home based exercise program.      4. Depression with anxiety  TSH    T4, Free      Begin daily: DULoxetine (CYMBALTA) 30 MG capsule  Risks, benefits and potential side effects of medication reviewed and patient agrees to use.     Continue nightly as needed: traZODone (DESYREL) 150 MG tablet    Continue nightly as needed: hydrOXYzine pamoate (VISTARIL) 25 MG capsule    Patient's depression is recurrent and is moderate without psychosis. Their depression is currently active and the condition is Initiating new treatment. This will be reassessed in 4 weeks. F/U as described:patient was prescribed an antidepressant medicine and patient depression is being managed by their pcp.     5. Gastroesophageal reflux disease, unspecified whether esophagitis present  Helicobacter Pylori, IgA IgG IgM    Begin twice daily as needed: Famotidine (Pepcid) 40 MG tablet     6. PCOS (polycystic ovarian syndrome)  Testosterone    Insulin, Total    Hemoglobin A1c    Comprehensive Metabolic Panel    We will consider initiation of Metformin after review of labs.     7. Weight gain  TSH    T4, Free     8. Fatigue, unspecified type  Comprehensive Metabolic Panel    Vitamin B12    Vitamin D 25 Hydroxy    CBC (No Diff)     9. Preventative health care  Lipid Panel     10. Need for hepatitis C screening test  Hepatitis C Antibody     11. Essential hypertension   well-controlled, continue current medications: Metoprolol succinate XL (TOPROL-XL) 100 MG 24 hr tablet    amLODIPine (NORVASC) 10 MG tablet         2. Patient Counseling:  --Nutrition: Stressed importance of moderation in sodium/caffeine intake, saturated fat and  cholesterol, caloric balance, sufficient intake of fresh fruits, vegetables, fiber, calcium, iron, and 1 g folate supplementation if of childbearing age.   --Discussed the issue of calcium supplement, and the daily use of baby aspirin if applicable.             --Mammogram recommended every 2 years from age 40-49 and yearly beginning at age 50.  --Exercise: Stressed the importance of regular exercise.   --Substance Abuse: Discussed cessation/primary prevention of tobacco (if applicable), alcohol, or other drug use (if applicable); driving or other dangerous activities under the influence; availability of treatment for abuse.    --Sexuality: Discussed sexually transmitted diseases, partner selection, use of condoms, avoidance of unintended pregnancy  and contraceptive alternatives.   --Injury prevention: Discussed safety belts, safety helmets, smoke detector, smoking near bedding or upholstery.   --Dental health: Discussed importance of regular tooth brushing, flossing, and dental visits every 6 months.  --Immunizations reviewed.  --Discussed benefits of screening colonoscopy (if applicable).  --After hours service discussed with patient.    3. Discussed the patient's BMI with her.  The BMI is above average; BMI management plan is completed  4. Return in about 1 month (around 5/20/2021) for Next scheduled follow up.         NIYAH Johnson  04/20/2021  11:26 EDT

## 2021-04-21 LAB
25(OH)D3+25(OH)D2 SERPL-MCNC: 23.2 NG/ML (ref 30–100)
ALBUMIN SERPL-MCNC: 3.9 G/DL (ref 3.5–5.2)
ALBUMIN/GLOB SERPL: 1.3 G/DL
ALP SERPL-CCNC: 87 U/L (ref 39–117)
ALT SERPL-CCNC: 27 U/L (ref 1–33)
AST SERPL-CCNC: 11 U/L (ref 1–32)
BILIRUB SERPL-MCNC: <0.2 MG/DL (ref 0–1.2)
BUN SERPL-MCNC: 16 MG/DL (ref 6–20)
BUN/CREAT SERPL: 24.6 (ref 7–25)
CALCIUM SERPL-MCNC: 9.7 MG/DL (ref 8.6–10.5)
CHLORIDE SERPL-SCNC: 101 MMOL/L (ref 98–107)
CHOLEST SERPL-MCNC: 188 MG/DL (ref 0–200)
CO2 SERPL-SCNC: 26.1 MMOL/L (ref 22–29)
CREAT SERPL-MCNC: 0.65 MG/DL (ref 0.57–1)
ERYTHROCYTE [DISTWIDTH] IN BLOOD BY AUTOMATED COUNT: 13 % (ref 12.3–15.4)
GLOBULIN SER CALC-MCNC: 2.9 GM/DL
GLUCOSE SERPL-MCNC: 117 MG/DL (ref 65–99)
H PYLORI IGA SER-ACNC: <9 UNITS (ref 0–8.9)
H PYLORI IGG SER IA-ACNC: 0.16 INDEX VALUE (ref 0–0.79)
H PYLORI IGM SER-ACNC: 13.7 UNITS (ref 0–8.9)
HBA1C MFR BLD: 5.7 % (ref 4.8–5.6)
HCT VFR BLD AUTO: 38.7 % (ref 34–46.6)
HCV AB S/CO SERPL IA: <0.1 S/CO RATIO (ref 0–0.9)
HDLC SERPL-MCNC: 81 MG/DL (ref 40–60)
HGB BLD-MCNC: 13 G/DL (ref 12–15.9)
INSULIN SERPL-ACNC: 42.4 UIU/ML (ref 2.6–24.9)
LDLC SERPL CALC-MCNC: 92 MG/DL (ref 0–100)
MCH RBC QN AUTO: 27.6 PG (ref 26.6–33)
MCHC RBC AUTO-ENTMCNC: 33.6 G/DL (ref 31.5–35.7)
MCV RBC AUTO: 82.2 FL (ref 79–97)
PLATELET # BLD AUTO: 357 10*3/MM3 (ref 140–450)
POTASSIUM SERPL-SCNC: 4.2 MMOL/L (ref 3.5–5.2)
PROT SERPL-MCNC: 6.8 G/DL (ref 6–8.5)
RBC # BLD AUTO: 4.71 10*6/MM3 (ref 3.77–5.28)
SODIUM SERPL-SCNC: 137 MMOL/L (ref 136–145)
T4 FREE SERPL-MCNC: 1.17 NG/DL (ref 0.93–1.7)
TESTOST SERPL-MCNC: <3 NG/DL (ref 8–48)
TRIGL SERPL-MCNC: 83 MG/DL (ref 0–150)
TSH SERPL DL<=0.005 MIU/L-ACNC: 0.87 UIU/ML (ref 0.27–4.2)
VIT B12 SERPL-MCNC: 521 PG/ML (ref 211–946)
VLDLC SERPL CALC-MCNC: 15 MG/DL (ref 5–40)
WBC # BLD AUTO: 11.56 10*3/MM3 (ref 3.4–10.8)

## 2021-04-22 PROBLEM — A04.8 H. PYLORI INFECTION: Status: ACTIVE | Noted: 2021-04-22

## 2021-04-22 PROBLEM — E55.9 VITAMIN D DEFICIENCY: Status: ACTIVE | Noted: 2021-04-22

## 2021-04-22 PROBLEM — R73.03 PREDIABETES: Status: ACTIVE | Noted: 2021-04-22

## 2021-04-22 RX ORDER — CLARITHROMYCIN 500 MG/1
500 TABLET, COATED ORAL 2 TIMES DAILY
Qty: 28 TABLET | Refills: 0 | Status: SHIPPED | OUTPATIENT
Start: 2021-04-22 | End: 2021-05-06

## 2021-04-22 RX ORDER — AMOXICILLIN 500 MG/1
1000 CAPSULE ORAL 2 TIMES DAILY
Qty: 56 CAPSULE | Refills: 0 | Status: SHIPPED | OUTPATIENT
Start: 2021-04-22 | End: 2021-05-06

## 2021-04-22 RX ORDER — OMEPRAZOLE 20 MG/1
20 CAPSULE, DELAYED RELEASE ORAL 2 TIMES DAILY
Qty: 28 CAPSULE | Refills: 0 | Status: SHIPPED | OUTPATIENT
Start: 2021-04-22 | End: 2021-05-06

## 2021-04-22 RX ORDER — METFORMIN HYDROCHLORIDE 500 MG/1
500 TABLET, EXTENDED RELEASE ORAL
Qty: 90 TABLET | Refills: 3 | Status: SHIPPED | OUTPATIENT
Start: 2021-04-22 | End: 2021-06-22

## 2021-05-24 ENCOUNTER — PATIENT MESSAGE (OUTPATIENT)
Dept: INTERNAL MEDICINE | Facility: CLINIC | Age: 26
End: 2021-05-24

## 2021-05-24 ENCOUNTER — OFFICE VISIT (OUTPATIENT)
Dept: INTERNAL MEDICINE | Facility: CLINIC | Age: 26
End: 2021-05-24

## 2021-05-24 VITALS
TEMPERATURE: 98.2 F | OXYGEN SATURATION: 100 % | DIASTOLIC BLOOD PRESSURE: 84 MMHG | BODY MASS INDEX: 48.15 KG/M2 | SYSTOLIC BLOOD PRESSURE: 116 MMHG | HEART RATE: 107 BPM | WEIGHT: 289 LBS | HEIGHT: 65 IN

## 2021-05-24 DIAGNOSIS — F41.8 DEPRESSION WITH ANXIETY: ICD-10-CM

## 2021-05-24 DIAGNOSIS — A04.8 H. PYLORI INFECTION: ICD-10-CM

## 2021-05-24 DIAGNOSIS — E66.01 CLASS 3 SEVERE OBESITY DUE TO EXCESS CALORIES WITH SERIOUS COMORBIDITY AND BODY MASS INDEX (BMI) OF 45.0 TO 49.9 IN ADULT (HCC): ICD-10-CM

## 2021-05-24 DIAGNOSIS — Z71.89 ENCOUNTER FOR PRE-BARIATRIC SURGERY COUNSELING AND EDUCATION: ICD-10-CM

## 2021-05-24 DIAGNOSIS — D72.829 LEUKOCYTOSIS, UNSPECIFIED TYPE: Primary | ICD-10-CM

## 2021-05-24 PROCEDURE — 99214 OFFICE O/P EST MOD 30 MIN: CPT | Performed by: PHYSICIAN ASSISTANT

## 2021-05-24 RX ORDER — SUMATRIPTAN 25 MG/1
25 TABLET, FILM COATED ORAL ONCE AS NEEDED
COMMUNITY
Start: 2021-05-17

## 2021-05-24 RX ORDER — DULOXETIN HYDROCHLORIDE 30 MG/1
30 CAPSULE, DELAYED RELEASE ORAL DAILY
Qty: 90 CAPSULE | Refills: 3 | Status: SHIPPED | OUTPATIENT
Start: 2021-05-24 | End: 2021-09-21 | Stop reason: SDUPTHER

## 2021-05-24 NOTE — PROGRESS NOTES
Follow Up Office Visit      Patient Name: Valery Hernandez  : 1995   MRN: [id number]     Chief Complaint:    Chief Complaint   Patient presents with   • Follow-up     discuss referral for weight loss, pt said she does need trazodone       History of Present Illness: Valery Hernandez is a 26 y.o. female who is here today with concern of obesity. She previously attended a weight loss clinic in [place] for longer than 6 months before the Covid- pandemic where she tried Adipex and B12 injections which did help her lose 70 pounds which she was unable to keep off when gyms closed during the Covid- pandemic. She then had ankle and foot troubles and just stopped wearing a boot around 10 days ago so she has finally resumed exercise and high protein, low-carb, calorie-deficit diet. She is exercising 3 days per week for 60-90 minutes. She is interested in pursuing bariatric surgery.     We will recheck for resolution of h. pylori after . Heartburn has nearly resolved but she is still taking Pepcid twice daily.     If myalgia occurs with cymbalta consider seratonin syndrome due to trazodone 150 mg. trazodone can cause myalgia alone.     She is taking metformin once daily for treatment of insulin resistance but has been having stomach cramps and diarrhea. She typically has not taken it with food.     1 month ago we initiated a trial of Cymbalta 30 mg daily for depression.  She previously had trouble with Prozac due to it causing severe myalgias which resolved after discontinuation. She has tolerated Cymbalta well and found it helpful.          Subjective      I have reviewed and the following portions of the patient's history were updated as appropriate: past family history, past medical history, past social history, past surgical history and problem list.      Current Outpatient Medications:   •  amLODIPine (NORVASC) 10 MG tablet, Take 1 tablet by mouth Daily., Disp: 90 tablet, Rfl: 3  •  cetirizine  "(zyrTEC) 10 MG tablet, Take 1 tablet by mouth Every Night., Disp: 90 tablet, Rfl: 3  •  DULoxetine (CYMBALTA) 30 MG capsule, Take 1 capsule by mouth Daily., Disp: 90 capsule, Rfl: 3  •  famotidine (Pepcid) 40 MG tablet, Take 1 tablet by mouth 2 (Two) Times a Day As Needed for Indigestion or Heartburn., Disp: 180 tablet, Rfl: 3  •  fluticasone (Flonase) 50 MCG/ACT nasal spray, 2 sprays into the nostril(s) as directed by provider Daily., Disp: 18.2 mL, Rfl: 11  •  hydrOXYzine pamoate (VISTARIL) 25 MG capsule, Take 1 capsule by mouth At Night As Needed for Anxiety (sleep)., Disp: 90 capsule, Rfl: 3  •  ibuprofen (ADVIL,MOTRIN) 800 MG tablet, Take 1 tablet by mouth Every 8 (Eight) Hours As Needed for Mild Pain  or Moderate Pain ., Disp: 60 tablet, Rfl: 0  •  metFORMIN ER (Glucophage XR) 500 MG 24 hr tablet, Take 1 tablet by mouth Daily With Breakfast., Disp: 90 tablet, Rfl: 3  •  metoprolol succinate XL (TOPROL-XL) 100 MG 24 hr tablet, Take 1 tablet by mouth Daily., Disp: 90 tablet, Rfl: 3  •  montelukast (SINGULAIR) 10 MG tablet, Take 1 tablet by mouth Every Night., Disp: 90 tablet, Rfl: 3  •  traZODone (DESYREL) 150 MG tablet, Take 1 tablet by mouth Every Night., Disp: 90 tablet, Rfl: 3  •  Tri-Sprintec 0.18/0.215/0.25 MG-35 MCG per tablet, Take 1 tablet by mouth Daily., Disp: 28 tablet, Rfl: 11  •  SUMAtriptan (IMITREX) 25 MG tablet, , Disp: , Rfl:     Allergies   Allergen Reactions   • Prozac [Fluoxetine] Myalgia   • Topamax [Topiramate] Rash     Rash, tingling and numnbess in bilateral feet.        Objective     Physical Exam:  Vital Signs:   Vitals:    05/24/21 1441   BP: 116/84   Pulse: 107   Temp: 98.2 °F (36.8 °C)   SpO2: 100%   Weight: 131 kg (289 lb)   Height: 165.1 cm (65\")     Body mass index is 48.09 kg/m².    Physical Exam  Vitals and nursing note reviewed.   Constitutional:       General: She is awake. She is not in acute distress.     Appearance: She is well-developed. She is morbidly obese. She is not " ill-appearing, toxic-appearing or diaphoretic.      Interventions: Face mask in place.   HENT:      Head: Normocephalic and atraumatic.      Right Ear: External ear normal.      Left Ear: External ear normal.   Eyes:      General: No scleral icterus.     Extraocular Movements: Extraocular movements intact.      Conjunctiva/sclera: Conjunctivae normal.      Pupils: Pupils are equal, round, and reactive to light.   Cardiovascular:      Rate and Rhythm: Normal rate and regular rhythm.      Heart sounds: Normal heart sounds. No murmur heard.   No friction rub. No gallop.    Pulmonary:      Effort: Pulmonary effort is normal. No respiratory distress.      Breath sounds: Normal breath sounds. No wheezing, rhonchi or rales.   Chest:      Chest wall: No tenderness.   Abdominal:      General: Bowel sounds are normal.      Palpations: Abdomen is soft.      Tenderness: There is no abdominal tenderness. There is no right CVA tenderness, left CVA tenderness, guarding or rebound.   Musculoskeletal:         General: No tenderness or deformity. Normal range of motion.      Cervical back: Normal range of motion and neck supple.      Right lower leg: No edema.      Left lower leg: No edema.   Skin:     General: Skin is warm and dry.      Capillary Refill: Capillary refill takes less than 2 seconds.      Coloration: Skin is not jaundiced or pale.      Findings: No erythema or rash.   Neurological:      General: No focal deficit present.      Mental Status: She is alert and oriented to person, place, and time.      Cranial Nerves: No cranial nerve deficit.      Sensory: No sensory deficit.      Motor: No abnormal muscle tone.      Coordination: Coordination normal.      Gait: Gait normal.      Deep Tendon Reflexes: Reflexes normal.   Psychiatric:         Mood and Affect: Mood normal.         Behavior: Behavior normal. Behavior is cooperative.         Thought Content: Thought content normal.         Judgment: Judgment normal.          Common labs    Common Labsle 4/20/21 4/20/21 4/20/21 4/20/21    1208 1208 1208 1208   Glucose  117 (A)     BUN  16     Creatinine  0.65     eGFR Non  Am  112     eGFR African Am  136     Sodium  137     Potassium  4.2     Chloride  101     Calcium  9.7     Total Protein  6.8     Albumin  3.90     Total Bilirubin  <0.2     Alkaline Phosphatase  87     AST (SGOT)  11     ALT (SGPT)  27     WBC   11.56 (A)    Hemoglobin   13.0    Hematocrit   38.7    Platelets   357    Total Cholesterol    188   Triglycerides    83   HDL Cholesterol    81 (A)   LDL Cholesterol     92   Hemoglobin A1C 5.70 (A)      (A) Abnormal value       Comments are available for some flowsheets but are not being displayed.               Assessment / Plan      Assessment/Plan:   Diagnoses and all orders for this visit:    1. Leukocytosis, unspecified type (Primary)  -     CBC & Differential  Recheck for resolution.    2. Class 3 severe obesity due to excess calories with serious comorbidity and body mass index (BMI) of 45.0 to 49.9 in adult (CMS/[facility])  -     Ambulatory Referral to Bariatric Surgery  Patient's (Body mass index is 48.09 kg/m².) indicates that they are morbidly obese (BMI > 40 or > 35 with obesity - related health condition) with obesity-related health conditions that include hypertension, GERD and Insulin resistance . Obesity is unchanged. BMI is is above average; BMI management plan is completed. We discussed low calorie, low carb based diet program, portion control, increasing exercise, joining a fitness center or start home based exercise program and consulting a Bariatric surgeon.     3. Encounter for pre-bariatric surgery counseling and education  -     Ambulatory Referral to Bariatric Surgery  Goals for the next month: follow low-carb, low-fat and high protein diet; lose 4 pounds over the next month; exercise 3+ days per week.     4. Depression with anxiety  -     Stable, continue: DULoxetine (CYMBALTA) 30 MG  capsule; Take 1 capsule by mouth Daily.  Dispense: 90 capsule; Refill: 3    5. H. pylori infection  -     H. Pylori Antigen, Stool - Stool, Per Rectum; Future  Recheck for resolution.           I spent 37 minutes caring for Valery on this date of service. This time includes time spent by me in the following activities:preparing for the visit, reviewing tests, obtaining and/or reviewing a separately obtained history, performing a medically appropriate examination and/or evaluation , counseling and educating the patient/family/caregiver, ordering medications, tests, or procedures, referring and communicating with other health care professionals  and documenting information in the medical record      Follow Up:   Return in about 1 month (around 6/24/2021) for Next scheduled follow up.    Patient was given instructions and counseling regarding her condition or for health maintenance advice. Please see specific information pulled into the AVS if appropriate.     Juani Corey PA-C  Primary Care [facility]

## 2021-05-25 LAB
BASOPHILS # BLD AUTO: 0.04 10*3/MM3 (ref 0–0.2)
BASOPHILS NFR BLD AUTO: 0.5 % (ref 0–1.5)
EOSINOPHIL # BLD AUTO: 0.06 10*3/MM3 (ref 0–0.4)
EOSINOPHIL NFR BLD AUTO: 0.8 % (ref 0.3–6.2)
ERYTHROCYTE [DISTWIDTH] IN BLOOD BY AUTOMATED COUNT: 13.4 % (ref 12.3–15.4)
HCT VFR BLD AUTO: 39.2 % (ref 34–46.6)
HGB BLD-MCNC: 13 G/DL (ref 12–15.9)
IMM GRANULOCYTES # BLD AUTO: 0.03 10*3/MM3 (ref 0–0.05)
IMM GRANULOCYTES NFR BLD AUTO: 0.4 % (ref 0–0.5)
LYMPHOCYTES # BLD AUTO: 2.98 10*3/MM3 (ref 0.7–3.1)
LYMPHOCYTES NFR BLD AUTO: 37.5 % (ref 19.6–45.3)
MCH RBC QN AUTO: 27.7 PG (ref 26.6–33)
MCHC RBC AUTO-ENTMCNC: 33.2 G/DL (ref 31.5–35.7)
MCV RBC AUTO: 83.4 FL (ref 79–97)
MONOCYTES # BLD AUTO: 0.51 10*3/MM3 (ref 0.1–0.9)
MONOCYTES NFR BLD AUTO: 6.4 % (ref 5–12)
NEUTROPHILS # BLD AUTO: 4.33 10*3/MM3 (ref 1.7–7)
NEUTROPHILS NFR BLD AUTO: 54.4 % (ref 42.7–76)
NRBC BLD AUTO-RTO: 0 /100 WBC (ref 0–0.2)
PLATELET # BLD AUTO: 288 10*3/MM3 (ref 140–450)
RBC # BLD AUTO: 4.7 10*6/MM3 (ref 3.77–5.28)
WBC # BLD AUTO: 7.95 10*3/MM3 (ref 3.4–10.8)

## 2021-06-22 ENCOUNTER — OFFICE VISIT (OUTPATIENT)
Dept: PSYCHIATRY | Facility: CLINIC | Age: 26
End: 2021-06-22

## 2021-06-22 ENCOUNTER — DOCUMENTATION (OUTPATIENT)
Dept: BARIATRICS/WEIGHT MGMT | Facility: CLINIC | Age: 26
End: 2021-06-22

## 2021-06-22 ENCOUNTER — OFFICE VISIT (OUTPATIENT)
Dept: BARIATRICS/WEIGHT MGMT | Facility: CLINIC | Age: 26
End: 2021-06-22

## 2021-06-22 VITALS
TEMPERATURE: 97.8 F | BODY MASS INDEX: 48.23 KG/M2 | HEART RATE: 130 BPM | OXYGEN SATURATION: 99 % | DIASTOLIC BLOOD PRESSURE: 66 MMHG | WEIGHT: 289.5 LBS | SYSTOLIC BLOOD PRESSURE: 118 MMHG | HEIGHT: 65 IN | RESPIRATION RATE: 18 BRPM

## 2021-06-22 DIAGNOSIS — K21.9 GASTROESOPHAGEAL REFLUX DISEASE, UNSPECIFIED WHETHER ESOPHAGITIS PRESENT: ICD-10-CM

## 2021-06-22 DIAGNOSIS — E55.9 VITAMIN D DEFICIENCY: ICD-10-CM

## 2021-06-22 DIAGNOSIS — A04.8 H. PYLORI INFECTION: ICD-10-CM

## 2021-06-22 DIAGNOSIS — R00.0 TACHYCARDIA: ICD-10-CM

## 2021-06-22 DIAGNOSIS — I10 ESSENTIAL HYPERTENSION: Chronic | ICD-10-CM

## 2021-06-22 DIAGNOSIS — I10 HYPERTENSION, UNSPECIFIED TYPE: ICD-10-CM

## 2021-06-22 DIAGNOSIS — E28.2 PCOS (POLYCYSTIC OVARIAN SYNDROME): Chronic | ICD-10-CM

## 2021-06-22 DIAGNOSIS — E66.01 MORBID OBESITY (HCC): ICD-10-CM

## 2021-06-22 DIAGNOSIS — F41.0 PANIC ATTACKS: ICD-10-CM

## 2021-06-22 DIAGNOSIS — F41.8 DEPRESSION WITH ANXIETY: ICD-10-CM

## 2021-06-22 DIAGNOSIS — R73.03 PREDIABETES: ICD-10-CM

## 2021-06-22 DIAGNOSIS — F41.9 ANXIETY: Primary | ICD-10-CM

## 2021-06-22 DIAGNOSIS — Z71.89 ENCOUNTER FOR PSYCHOLOGICAL ASSESSMENT PRIOR TO BARIATRIC SURGERY: ICD-10-CM

## 2021-06-22 DIAGNOSIS — R53.83 FATIGUE, UNSPECIFIED TYPE: Primary | ICD-10-CM

## 2021-06-22 PROCEDURE — 99204 OFFICE O/P NEW MOD 45 MIN: CPT | Performed by: PHYSICIAN ASSISTANT

## 2021-06-22 PROCEDURE — 90791 PSYCH DIAGNOSTIC EVALUATION: CPT | Performed by: PSYCHOLOGIST

## 2021-06-22 RX ORDER — PHENTERMINE HYDROCHLORIDE 37.5 MG/1
37.5 CAPSULE ORAL EVERY MORNING
COMMUNITY
End: 2021-06-24

## 2021-06-22 NOTE — PROGRESS NOTES
PROGRESS NOTE    Data:    Valery Hernandez is a 26 y.o. female who met with the undersigned for a scheduled individual outpatient therapy session from 2:40 - 3:20pm.      Clinical Maneuvering/Intervention:      The pt talked about struggling with obesity for several years. Despite trying different weight loss plans and diets, the pt reported being unsuccessful in losing weight. A psychological evaluation was conducted in order to assess past and current level of functioning.Areas assessed included, but were not limited to: perception of social support, perception of ability to face and deal with challenges in life (positive functioning), anxiety symptoms, depressive symptoms, perspective on beliefs/belief system, coping skills for stress, intelligence level, addiction issues, etc. Therapeutic rapport was established. Interventions conducted today were geared towards assessing the pt's readiness for weight loss surgery and identifying and psychological contraindications for undergoing such a major life change. Social support was deemed strong (specific to weight loss surgery/weight loss in this manner and in a general sense): fiance and friends. Current psychological struggles were deemed low, but included anxiety and panic attacks. She works with a therapist regularly and takes psychotropic medication that she describes as helpful in managing these issues. Coping skills for distress and related to undergoing a major life change such as weight loss surgery/weight loss were deemed strong and included knowing what she wants in life (weight loss surgery, to become a nurse, get , have children), having good insight into her issues, knowing how to set healthy boundaries with others, and believing in herself that she will be successful with weight loss surgery. The pt endorsed having characteristics of readiness to undergo major life changes inherent in the journey of weight loss surgery. She could clearly explain why  it is the right option for her at this time and in life and the best option for her to help her lose weight. The pt expressed gratitude for today's visit.          Mental Status Exam  Hygiene:  good  Dress: normal  Attitude:  cooperative and proactive  Motor Activity: normal  Speech: normal  Mood:   excited about weight loss  Affect:  congruent  Thought Processes: normal  Thought Content:  normal  Suicidal Thoughts:  not endorsed  Homicidal Thoughts:  not endorsed  Crisis Safety Plan: not needed   Hallucinations:  none      Patient's Support Network Includes:  family, friends      Progress toward goal: there is evidence to suggest that she is taking measures to improve the quality of her life including seeking weight loss surgery.       Functional Status: moderate to high      Prognosis: good    Assessment      The pt presented to be struggling with anxiety, panic attacks, and obesity (e.g., body image problems). She otherwise presents as a fairly highly functioning person with many strong coping skills for stress. She is an intelligent and insightful person.     From a psychological standpoint, the pt presents as a good candidate for bariatric surgery. She is motivated for the surgery, has showed readiness for the lifestyle change in terms of adjusting her eating habits, and seems to have appropriate expectations of how to prepare and how to live after surgery in order to lose weight successfully.    Plan      In order to diminish symptoms of anxiety/panic (via gaining control over her health) and improve her quality of life in general, the pt is to follow up with her bariatric surgeon in order to receive weight loss surgery as soon as feasible/appropriate and based on success with compliance to adhering to the proper diet. She will continue with counseling and taking psychotropic medication as prescribed (ongoing).     Rosalva Mclaughlin, PhD, LP

## 2021-06-22 NOTE — PROGRESS NOTES
[facility] BARIATRIC SURGERY  [address]  [address]  [facility] [address]  [phone]      Patient  Name:  Valery Hernandez  :  1995        Chief Complaint:  weight gain; unable to maintain weight loss    History of Present Illness:  Valery Hernandez is a 26 y.o. female who presents today for evaluation, education and consultation regarding bariatric and metabolic surgery. The patient is interested in sleeve gastrectomy with Dr. Viveros.     Valery has been overweight for at least 12 years, has been 35 pounds or more overweight for at least 12 years, has been 100 pounds or more overweight for 8 or more years and started dieting at age 14.      Previous diet attempts include: Calorie Counting, Fasting and Slim Fast; [facility]; Ionamin/Adipex.  The most weight Valery lost was 70 pounds on exercise/ WW but was only able to maintain that weight loss for 10 months.  Her maximum lifetime weight is 305 pounds.    As above, patient has been overweight for many years, with numerous failed dietary/weight loss attempts.  She now has obesity related comorbidities and as such has decided to pursue weight loss surgery.    H/o HTN, PCOS- insulin resistance, migraines, joint pain- not treated with meds, anxiety/ depression.     GI: h/o positive h pylori antibodies treated by PCP recently, has stool study pending for recheck. Chronic GERD controlled with daily pepcid. No prior EGD. S/p dorothy, acalculous. No other GI complaints.     All other past medical, surgical, social and family history have been obtained and discussed as pertinent to bariatric surgery as below.     Past Medical History:   Diagnosis Date   • Acid reflux    • Anemia    • Anxiety    • Depression    • GERD (gastroesophageal reflux disease)     pepcid daily controls symptoms, h pylori antibodies + with treatment, no prior EGD   • H. pylori infection 2021   • Hypertension    • Joint pain     not treated with meds   • Migraine      takes sumatriptan prn   • Polycystic ovary syndrome    • Prediabetes 4/22/2021   • S/P dorothy     acalculous   • Urinary tract infection    • Vitamin D deficiency 4/22/2021     Past Surgical History:   Procedure Laterality Date   • LAPAROSCOPIC CHOLECYSTECTOMY  2016   • TONSILLECTOMY  2003   • WISDOM TOOTH EXTRACTION  2014       Allergies   Allergen Reactions   • Prozac [Fluoxetine] Myalgia   • Topamax [Topiramate] Rash     Rash, tingling and numnbess in bilateral feet.        Current Outpatient Medications:   •  amLODIPine (NORVASC) 10 MG tablet, Take 1 tablet by mouth Daily., Disp: 90 tablet, Rfl: 3  •  cetirizine (zyrTEC) 10 MG tablet, Take 1 tablet by mouth Every Night., Disp: 90 tablet, Rfl: 3  •  DULoxetine (CYMBALTA) 30 MG capsule, Take 1 capsule by mouth Daily., Disp: 90 capsule, Rfl: 3  •  famotidine (Pepcid) 40 MG tablet, Take 1 tablet by mouth 2 (Two) Times a Day As Needed for Indigestion or Heartburn., Disp: 180 tablet, Rfl: 3  •  fluticasone (Flonase) 50 MCG/ACT nasal spray, 2 sprays into the nostril(s) as directed by provider Daily., Disp: 18.2 mL, Rfl: 11  •  hydrOXYzine pamoate (VISTARIL) 25 MG capsule, Take 1 capsule by mouth At Night As Needed for Anxiety (sleep)., Disp: 90 capsule, Rfl: 3  •  metoprolol succinate XL (TOPROL-XL) 100 MG 24 hr tablet, Take 1 tablet by mouth Daily., Disp: 90 tablet, Rfl: 3  •  montelukast (SINGULAIR) 10 MG tablet, Take 1 tablet by mouth Every Night., Disp: 90 tablet, Rfl: 3  •  phentermine 37.5 MG capsule, Take 37.5 mg by mouth Every Morning., Disp: , Rfl:   •  SUMAtriptan (IMITREX) 25 MG tablet, Take 25 mg by mouth 1 (One) Time As Needed., Disp: , Rfl:   •  traZODone (DESYREL) 150 MG tablet, Take 1 tablet by mouth Every Night., Disp: 90 tablet, Rfl: 3  •  Tri-Sprintec 0.18/0.215/0.25 MG-35 MCG per tablet, Take 1 tablet by mouth Daily., Disp: 28 tablet, Rfl: 11  •  ibuprofen (ADVIL,MOTRIN) 800 MG tablet, Take 1 tablet by mouth Every 8 (Eight) Hours As Needed for  Mild Pain  or Moderate Pain . (Patient taking differently: Take 600 mg by mouth Every 8 (Eight) Hours As Needed for Mild Pain  or Moderate Pain .), Disp: 60 tablet, Rfl: 0    Social History     Socioeconomic History   • Marital status: Single     Spouse name: Not on file   • Number of children: Not on file   • Years of education: Not on file   • Highest education level: Not on file   Tobacco Use   • Smoking status: Never Smoker   • Smokeless tobacco: Never Used   Vaping Use   • Vaping Use: Never used   Substance and Sexual Activity   • Alcohol use: Yes     Comment: social   • Drug use: No   • Sexual activity: Yes     Partners: Female     Birth control/protection: OCP     Family History   Problem Relation Age of Onset   • Hyperlipidemia Father    • Hypertension Father    • Obesity Father    • Hypertension Paternal Grandfather    • Obesity Paternal Grandfather    • Heart disease Paternal Grandmother    • Hyperlipidemia Paternal Grandmother    • Obesity Paternal Grandmother    • Diabetes Paternal Grandmother    • Arthritis Other    • Diabetes Other    • Heart attack Other    • Hypertension Other    • Hyperlipidemia Other    • Obesity Other    • Obesity Mother    • Obesity Maternal Grandmother    • Obesity Maternal Grandfather        Review of Systems:  Constitutional:  Reports fatigue, weight gain and denies fevers, chills.  HEENT:  denies headache, ear pain or loss of hearing, blurred or double vision, nasal discharge or sore throat.  Cardiovascular:  Reports HTN and denies HLD, CAD, Atrial Fib, hx heart disease, heart murmur, hx MI, chest pain, palpitations, edema, hx DVT.  Respiratory:  Reports none and denies dyspnea on exertion, shortness of breath , cough , wheezing, sleep apnea, asthma, hx PE.  Gastrointestinal:  Reports heartburn, gallbladder issues and denies dysphagia, nausea, vomiting, abdominal pain, IBS, diarrhea, constipation, melena, blood in stool, liver disease, hx pancreatitis.  Genitourinary:   Reports none and denies history of  frequent UTI, incontinence, hematuria, dysuria, polyuria, polydipsia, renal insufficiency, renal failure.    Musculoskeletal:  Reports none and denies joint pain, fibromyalgia, arthritis and autoimmune disease.  Neurological:  Reports migraines and denies numbness /tingling, dizziness, confusion, seizure, stroke.  Psychiatric:  Reports hx depression, hx anxiety and denies bipolar disorder, suicidal ideation, hx suicide attempt, hx self injury, hx substance abuse, eating disorder.  Endocrine:  Reports glucose intolerance and denies thyroid disease, gout.  Hematologic:  Reports none and denies bruising, bleeding disorder, hx anemia, hx blood transfusion.  Skin:  Reports none and denies rashes, hx MRSA.      Physical Exam:  Vital Signs:  Weight: 131 kg (289 lb 8 oz)   Body mass index is 48.18 kg/m².  Temp: 97.8 °F (36.6 °C)   Heart Rate: (!) 130   BP: 118/66     Physical Exam  Vitals reviewed.   Constitutional:       Appearance: She is well-developed. She is obese.   HENT:      Head: Normocephalic.   Neck:      Thyroid: No thyromegaly.   Cardiovascular:      Rate and Rhythm: Normal rate and regular rhythm.      Heart sounds: Normal heart sounds.   Pulmonary:      Effort: Pulmonary effort is normal. No respiratory distress.      Breath sounds: Normal breath sounds. No wheezing.   Abdominal:      General: Bowel sounds are normal. There is no distension.      Palpations: Abdomen is soft.      Tenderness: There is no abdominal tenderness.      Comments: Lap scars   Musculoskeletal:         General: Normal range of motion.      Cervical back: Normal range of motion and neck supple.   Skin:     General: Skin is warm and dry.   Neurological:      Mental Status: She is alert and oriented to person, place, and time.   Psychiatric:         Mood and Affect: Mood normal.         Behavior: Behavior normal.         Thought Content: Thought content normal.         Judgment: Judgment normal.          Patient Active Problem List   Diagnosis   • Depression with anxiety   • Gastroesophageal reflux disease   • PCOS (polycystic ovarian syndrome)   • Essential hypertension   • Prediabetes   • Vitamin D deficiency   • H. pylori infection   • Hypertension   • Depression   • Anxiety       Assessment:    Valery Hernandez is a 26 y.o. year old female with medically complicated obesity pursuing sleeve gastrectomy.    Weight loss surgery is deemed medically necessary given the following obesity related comorbidities including hypertension and GERD with current Weight: 131 kg (289 lb 8 oz) and Body mass index is 48.18 kg/m²..    Plan:  The consultation plan and program requirements were reviewed with the patient.  The patient has been advised that a letter of medical support must be obtained from her primary care physician or referring provider. A psychological evaluation will be arranged.  A nutritional evaluation will be performed.  The patient was advised to start a high protein and low carbohydrate diet.  Necessary lifestyle modifications were discussed.  Instructions on how to access ROLAND was given to the patient.  ROLAND is an internet based educational video that explains the surgical procedure chosen and answers basic questions regarding that procedure.     Patient's Body mass index is 48.18 kg/m². indicating that she is morbidly obese (BMI > 40 or > 35 with obesity - related health condition). Obesity-related health conditions include the following: as above. Obesity is worsening. BMI is is above average; BMI management plan is completed. We discussed consulting a Bariatric surgeon..        Preoperative testing will include: CBC, CMP, Lipids, TSH, HgA1C, EKG, CXR and EGD     The risks and benefits of the upper endoscopy were discussed with the patient in detail and all questions were answered.  Possibility of perforation, bleeding, aspiration, and anesthesia reaction were reviewed.  Patient agrees to  proceed.      Additional preop clearances required prior to surgery: Cardiology.      The patient has been educated on expected postoperative lifestyle changes, including commitment to high protein diet, vitamin regimen, and exercise program.  They are aware that support groups are encouraged for optimal weight loss results. Patient understands that bariatric surgery is not cosmetic surgery but rather a tool to help make a lifelong commitment to lifestyle changes including diet, exercise, behavior modifications, and healthy habits. The procedure was discussed with the patient and all questions were answered. The importance of avoiding ASA/ NSAIDS/ steroids/ tobacco/ hormones/ immunomodulators perioperatively was discussed.         Ami Jacobson PA-C

## 2021-06-22 NOTE — PROGRESS NOTES
"Weight Loss Surgery  Presurgical Nutrition Assessment     Valery Hernandez  06/22/2021  [id number]  [id number]  1995  female    Surgery desired: Sleeve Gastrectomy    Ht 165.1 cm (65\"); Wt 131 kg (289.5 #); BMI 48.18  Past Medical History:   Diagnosis Date   • Anemia    • Anxiety    • Depression    • GERD (gastroesophageal reflux disease)    • H. pylori infection 4/22/2021   • Hypertension    • Migraine    • Polycystic ovary syndrome    • Prediabetes 4/22/2021   • Urinary tract infection    • Vitamin D deficiency 4/22/2021     Past Surgical History:   Procedure Laterality Date   • CHOLECYSTECTOMY  2016   • DENTAL PROCEDURE     • TONSILLECTOMY  2003   • WISDOM TOOTH EXTRACTION  2014     Allergies   Allergen Reactions   • Prozac [Fluoxetine] Myalgia   • Topamax [Topiramate] Rash     Rash, tingling and numnbess in bilateral feet.        Current Outpatient Medications:   •  amLODIPine (NORVASC) 10 MG tablet, Take 1 tablet by mouth Daily., Disp: 90 tablet, Rfl: 3  •  cetirizine (zyrTEC) 10 MG tablet, Take 1 tablet by mouth Every Night., Disp: 90 tablet, Rfl: 3  •  DULoxetine (CYMBALTA) 30 MG capsule, Take 1 capsule by mouth Daily., Disp: 90 capsule, Rfl: 3  •  famotidine (Pepcid) 40 MG tablet, Take 1 tablet by mouth 2 (Two) Times a Day As Needed for Indigestion or Heartburn., Disp: 180 tablet, Rfl: 3  •  fluticasone (Flonase) 50 MCG/ACT nasal spray, 2 sprays into the nostril(s) as directed by provider Daily., Disp: 18.2 mL, Rfl: 11  •  hydrOXYzine pamoate (VISTARIL) 25 MG capsule, Take 1 capsule by mouth At Night As Needed for Anxiety (sleep)., Disp: 90 capsule, Rfl: 3  •  ibuprofen (ADVIL,MOTRIN) 800 MG tablet, Take 1 tablet by mouth Every 8 (Eight) Hours As Needed for Mild Pain  or Moderate Pain . (Patient taking differently: Take 600 mg by mouth Every 8 (Eight) Hours As Needed for Mild Pain  or Moderate Pain .), Disp: 60 tablet, Rfl: 0  •  metoprolol succinate XL (TOPROL-XL) 100 MG 24 hr tablet, Take 1 tablet by " mouth Daily., Disp: 90 tablet, Rfl: 3  •  montelukast (SINGULAIR) 10 MG tablet, Take 1 tablet by mouth Every Night., Disp: 90 tablet, Rfl: 3  •  phentermine 37.5 MG capsule, Take 37.5 mg by mouth Every Morning., Disp: , Rfl:   •  SUMAtriptan (IMITREX) 25 MG tablet, Take 25 mg by mouth 1 (One) Time As Needed., Disp: , Rfl:   •  traZODone (DESYREL) 150 MG tablet, Take 1 tablet by mouth Every Night., Disp: 90 tablet, Rfl: 3  •  Tri-Sprintec 0.18/0.215/0.25 MG-35 MCG per tablet, Take 1 tablet by mouth Daily., Disp: 28 tablet, Rfl: 11      Nutrition Assessment    Estimated energy needs: 2060 kcal    Estimated calories for weight loss:  1500 kcal    IBW (Pounds):  150 #        Excess body weight (Pounds):  140 #       Nutrition Recall  24 Hour recall: (B) (L) (D) -  Reviewed and discussed with patient.  Brkfast @ 9:30 am = 1 saus-egg-chz biscuit /c 10 oz diet Coke OR nothing but 16 oz water.  Lunch @ 12 - 1:30 pm = turkey & chz sandwich, 1 sl siddharth leches dessert + water OR 1 turkey & chz sandwich /c 24 Pringles & 2 chz sticks /c water. Afternoon snk = 1.5 oz bag baked Lays /c 16 oz water.  Dinner @ 11:30 pm = 10 piece chicken nuggets /c 1 order cody pub fries & 20 oz grape Antonia Zero soda.  (Alternate dinner @ 7 pm = 3 sl chicken cody ranch pizza, 1/2 c pralines & cream ice cream & 20 oz diet Coke.  Diet contains no fruits or veggies. Marginal in protein. Excessive in  processed food.  Pt to focus on ingesting adeq protein for wt mgt in 3 reg balanced meals + 2-3 high protein snks qd.        Exercise  three times a week - 2 to 3 Xs q wk to gym (elliptical & other machines, & workout video)      Education    Provided information packet re:  Sleeve Gastrectomy  1. Reviewed guidelines for higher protein, limited carbohydrate diet to promote weight loss.  Encouraged patient to incorporate these principles of healthy eating from now until approximately 2 weeks prior to bariatric surgery date, when an even lower carbohydrate  “liver-shrinking” regimen will be followed. (Information sheet re pre-op diet given).  Explained that after recovery from surgery this diet will again be followed to ensure further loss and for weight maintenance.    2. Encouraged patient to choose an acceptable protein supplement powder or shake for post-surgery liquid diet.  Provided product guidelines and examples.    3. Explained importance of goal setting to help in changing eating behaviors that are not conducive to weight loss.  Targeted several on a worksheet which also included spaces for patient to work on issues specific to them.  4. Provided follow-up options for support, including contact information for dietitians here, if desired.  Web-based support information and apps for smart phones and computers given.  Noted that monthly support group is offered at this clinic, and that support is associated with successful weight loss.    Recommend that team proceed with surgery and follow per protocol.      Nutrition Goals   Dietary Guidelines per information packet as described above  Protein goal:  grams per day   Carbohydrate goal:  100-140 grams per day  Eliminate soda, sweet tea, etc.     Exercise Goals  Continue current exercise routine   Add 15-30 minutes of activity per day as tolerated      Kristen Mast, HOSEA  06/22/2021  13:47 EDT

## 2021-06-24 ENCOUNTER — OFFICE VISIT (OUTPATIENT)
Dept: INTERNAL MEDICINE | Facility: CLINIC | Age: 26
End: 2021-06-24

## 2021-06-24 VITALS
TEMPERATURE: 97.5 F | SYSTOLIC BLOOD PRESSURE: 110 MMHG | BODY MASS INDEX: 47.65 KG/M2 | HEART RATE: 98 BPM | HEIGHT: 65 IN | OXYGEN SATURATION: 98 % | WEIGHT: 286 LBS | DIASTOLIC BLOOD PRESSURE: 72 MMHG

## 2021-06-24 DIAGNOSIS — E66.01 CLASS 3 SEVERE OBESITY DUE TO EXCESS CALORIES WITH SERIOUS COMORBIDITY AND BODY MASS INDEX (BMI) OF 45.0 TO 49.9 IN ADULT (HCC): ICD-10-CM

## 2021-06-24 DIAGNOSIS — Z71.89 ENCOUNTER FOR PRE-BARIATRIC SURGERY COUNSELING AND EDUCATION: Primary | ICD-10-CM

## 2021-06-24 DIAGNOSIS — F41.8 DEPRESSION WITH ANXIETY: ICD-10-CM

## 2021-06-24 PROCEDURE — 99213 OFFICE O/P EST LOW 20 MIN: CPT | Performed by: PHYSICIAN ASSISTANT

## 2021-06-24 RX ORDER — HYDROXYZINE PAMOATE 50 MG/1
50 CAPSULE ORAL NIGHTLY PRN
Qty: 90 CAPSULE | Refills: 3 | Status: SHIPPED | OUTPATIENT
Start: 2021-06-24 | End: 2022-01-20 | Stop reason: SDUPTHER

## 2021-06-24 NOTE — PROGRESS NOTES
Follow Up Office Visit      Patient Name: Valery Hernandez  : 1995   MRN: [id number]     Chief Complaint:    Chief Complaint   Patient presents with   • Follow-up     pre-bariatric surgery counseling        History of Present Illness: Valery Hernandez is a 26 y.o. female who is here today for the 2nd of 6 required prebariatric surgery counseling visits.  1 month ago we met and discussed obesity treatment plan at which time she was referred to bariatric surgery for consultation.  She was able to establish care with bariatric surgery 2 days ago at which time prebariatric surgery diet was discussed.  She has not yet started the diet but is going to the grocery store today and plans to begin the diet tomorrow.  She voiced understanding of the need for low carbohydrate, low fat and high-protein diet.  She is exercising 2 to 3 days a week for 30 to 60 minutes each occurrence.  Her current struggles include sweets and bread.    She stopped metformin for insulin resistance due to GI intolerance.         Subjective      I have reviewed and the following portions of the patient's history were updated as appropriate: past family history, past medical history, past social history, past surgical history and problem list.      Current Outpatient Medications:   •  amLODIPine (NORVASC) 10 MG tablet, Take 1 tablet by mouth Daily., Disp: 90 tablet, Rfl: 3  •  cetirizine (zyrTEC) 10 MG tablet, Take 1 tablet by mouth Every Night., Disp: 90 tablet, Rfl: 3  •  DULoxetine (CYMBALTA) 30 MG capsule, Take 1 capsule by mouth Daily., Disp: 90 capsule, Rfl: 3  •  famotidine (Pepcid) 40 MG tablet, Take 1 tablet by mouth 2 (Two) Times a Day As Needed for Indigestion or Heartburn., Disp: 180 tablet, Rfl: 3  •  fluticasone (Flonase) 50 MCG/ACT nasal spray, 2 sprays into the nostril(s) as directed by provider Daily., Disp: 18.2 mL, Rfl: 11  •  hydrOXYzine pamoate (VISTARIL) 50 MG capsule, Take 1 capsule by mouth At Night As Needed for  "Anxiety (sleep)., Disp: 90 capsule, Rfl: 3  •  ibuprofen (ADVIL,MOTRIN) 800 MG tablet, Take 1 tablet by mouth Every 8 (Eight) Hours As Needed for Mild Pain  or Moderate Pain . (Patient taking differently: Take 600 mg by mouth Every 8 (Eight) Hours As Needed for Mild Pain  or Moderate Pain .), Disp: 60 tablet, Rfl: 0  •  metoprolol succinate XL (TOPROL-XL) 100 MG 24 hr tablet, Take 1 tablet by mouth Daily., Disp: 90 tablet, Rfl: 3  •  montelukast (SINGULAIR) 10 MG tablet, Take 1 tablet by mouth Every Night., Disp: 90 tablet, Rfl: 3  •  SUMAtriptan (IMITREX) 25 MG tablet, Take 25 mg by mouth 1 (One) Time As Needed., Disp: , Rfl:   •  traZODone (DESYREL) 150 MG tablet, Take 1 tablet by mouth Every Night., Disp: 90 tablet, Rfl: 3  •  Tri-Sprintec 0.18/0.215/0.25 MG-35 MCG per tablet, Take 1 tablet by mouth Daily., Disp: 28 tablet, Rfl: 11    Allergies   Allergen Reactions   • Prozac [Fluoxetine] Myalgia   • Metformin GI Intolerance   • Topamax [Topiramate] Rash     Rash, tingling and numnbess in bilateral feet.        Objective     Physical Exam:  Vital Signs:   Vitals:    06/24/21 1045   BP: 110/72   Pulse: 98   Temp: 97.5 °F (36.4 °C)   SpO2: 98%   Weight: 130 kg (286 lb)   Height: 165.1 cm (65\")     Body mass index is 47.59 kg/m².    Physical Exam  Vitals and nursing note reviewed.   Constitutional:       General: She is awake. She is not in acute distress.     Appearance: She is well-developed. She is morbidly obese. She is not ill-appearing, toxic-appearing or diaphoretic.      Interventions: Face mask in place.   HENT:      Head: Normocephalic and atraumatic.      Right Ear: External ear normal.      Left Ear: External ear normal.   Eyes:      General: No scleral icterus.     Extraocular Movements: Extraocular movements intact.      Conjunctiva/sclera: Conjunctivae normal.      Pupils: Pupils are equal, round, and reactive to light.   Cardiovascular:      Rate and Rhythm: Normal rate and regular rhythm.      Heart " sounds: Normal heart sounds. No murmur heard.   No friction rub. No gallop.    Pulmonary:      Effort: Pulmonary effort is normal. No respiratory distress.      Breath sounds: Normal breath sounds. No wheezing, rhonchi or rales.   Chest:      Chest wall: No tenderness.   Abdominal:      General: Bowel sounds are normal.      Palpations: Abdomen is soft.      Tenderness: There is no abdominal tenderness.   Musculoskeletal:         General: No tenderness or deformity. Normal range of motion.      Cervical back: Normal range of motion and neck supple.      Right lower leg: No edema.      Left lower leg: No edema.   Skin:     General: Skin is warm and dry.      Capillary Refill: Capillary refill takes less than 2 seconds.      Coloration: Skin is not jaundiced or pale.      Findings: No erythema or rash.   Neurological:      General: No focal deficit present.      Mental Status: She is alert and oriented to person, place, and time.      Cranial Nerves: No cranial nerve deficit.      Sensory: No sensory deficit.      Motor: No abnormal muscle tone.      Coordination: Coordination normal.      Gait: Gait normal.      Deep Tendon Reflexes: Reflexes normal.   Psychiatric:         Mood and Affect: Mood normal.         Behavior: Behavior normal. Behavior is cooperative.         Thought Content: Thought content normal.         Judgment: Judgment normal.         Common labs    Common Labsle 4/20/21 4/20/21 4/20/21 4/20/21 5/24/21    1208 1208 1208 1208    Glucose  117 (A)      BUN  16      Creatinine  0.65      eGFR Non  Am  112      eGFR African Am  136      Sodium  137      Potassium  4.2      Chloride  101      Calcium  9.7      Total Protein  6.8      Albumin  3.90      Total Bilirubin  <0.2      Alkaline Phosphatase  87      AST (SGOT)  11      ALT (SGPT)  27      WBC   11.56 (A)  7.95   Hemoglobin   13.0  13.0   Hematocrit   38.7  39.2   Platelets   357  288   Total Cholesterol    188    Triglycerides    83    HDL  Cholesterol    81 (A)    LDL Cholesterol     92    Hemoglobin A1C 5.70 (A)       (A) Abnormal value       Comments are available for some flowsheets but are not being displayed.               Assessment / Plan      Assessment/Plan:   Diagnoses and all orders for this visit:    1. Encounter for pre-bariatric surgery counseling and education (Primary)  2. Class 3 severe obesity due to excess calories with serious comorbidity and body mass index (BMI) of 45.0 to 49.9 in adult (CMS/[facility])  Goals for the next month: start low-carb, low-fat and high protein diet. She plans to begin by working on the low-carb portion. Continue exercise 2-3 days per week, 30-60 minutes each time.       Patient's (Body mass index is 47.59 kg/m².) indicates that they are morbidly obese (BMI > 40 or > 35 with obesity - related health condition) with obesity-related health conditions that include hypertension . Obesity is improving with lifestyle modifications. BMI is is above average; BMI management plan is completed. We discussed low calorie, low carb based diet program, portion control, increasing exercise, joining a fitness center or start home based exercise program and consulting a Bariatric surgeon.       Return in 1 month for the 3rd of 6 required prebariatric surgery counseling visits.        I spent 24 minutes caring for Valery on this date of service. This time includes time spent by me in the following activities:preparing for the visit, performing a medically appropriate examination and/or evaluation , counseling and educating the patient/family/caregiver, documenting information in the medical record and care coordination    Follow Up:   Return in about 1 month (around 7/24/2021) for Next scheduled follow up weight check.    Patient was given instructions and counseling regarding her condition or for health maintenance advice. Please see specific information pulled into the AVS if appropriate.     Juani Corey PA-C  Primary Care  [facility]     Please note that portions of this note may have been completed with a voice recognition program. Efforts were made to edit the dictations, but occasionally words are mistranscribed.

## 2021-07-12 ENCOUNTER — OFFICE VISIT (OUTPATIENT)
Dept: CARDIOLOGY | Facility: CLINIC | Age: 26
End: 2021-07-12

## 2021-07-12 VITALS
HEART RATE: 86 BPM | WEIGHT: 292 LBS | OXYGEN SATURATION: 96 % | DIASTOLIC BLOOD PRESSURE: 86 MMHG | BODY MASS INDEX: 48.65 KG/M2 | SYSTOLIC BLOOD PRESSURE: 120 MMHG | HEIGHT: 65 IN

## 2021-07-12 DIAGNOSIS — Z01.810 PRE-OPERATIVE CARDIOVASCULAR EXAMINATION: Primary | ICD-10-CM

## 2021-07-12 DIAGNOSIS — I10 ESSENTIAL HYPERTENSION: Chronic | ICD-10-CM

## 2021-07-12 PROCEDURE — 99243 OFF/OP CNSLTJ NEW/EST LOW 30: CPT | Performed by: INTERNAL MEDICINE

## 2021-07-12 PROCEDURE — 93000 ELECTROCARDIOGRAM COMPLETE: CPT | Performed by: INTERNAL MEDICINE

## 2021-07-12 NOTE — PROGRESS NOTES
Subjective:     Encounter Date:07/12/2021      Patient ID: Valery Hernandez is a 26 y.o. female.    Chief Complaint: Preoperative cardiovascular evaluation  HPI  This is a 26-year-old female patient with no prior history of heart disease who presents to cardiology clinic for evaluation prior to elective bariatric surgery.  The patient has had other prior nonvascular noncardiac surgery with no cardiovascular complications.  She has no history of myocardial infarction, coronary artery disease, coronary revascularization, congestive heart failure, valvular heart disease or arrhythmia.  She is active with no exertional symptoms or limitations.  She has a history of hypertension but no personal history of diabetes or dyslipidemia.  She is a non-smoker.  The following portions of the patient's history were reviewed and updated as appropriate: allergies, current medications, past family history, past medical history, past social history, past surgical history and problem  Review of Systems   Constitutional: Negative for chills, diaphoresis, fever, malaise/fatigue, weight gain and weight loss.   HENT: Negative for ear discharge, hearing loss, hoarse voice and nosebleeds.    Eyes: Negative for discharge, double vision, pain and photophobia.   Cardiovascular: Negative for chest pain, claudication, cyanosis, dyspnea on exertion, irregular heartbeat, leg swelling, near-syncope, orthopnea, palpitations, paroxysmal nocturnal dyspnea and syncope.   Respiratory: Negative for cough, hemoptysis, shortness of breath, sputum production and wheezing.    Endocrine: Negative for cold intolerance, heat intolerance, polydipsia, polyphagia and polyuria.   Hematologic/Lymphatic: Negative for adenopathy and bleeding problem. Does not bruise/bleed easily.   Skin: Negative for color change, flushing, itching and rash.   Musculoskeletal: Negative for muscle cramps, muscle weakness, myalgias and stiffness.   Gastrointestinal: Negative for  abdominal pain, diarrhea, hematemesis, hematochezia, nausea and vomiting.   Genitourinary: Negative for dysuria, frequency and nocturia.   Neurological: Negative for focal weakness, loss of balance, numbness, paresthesias and seizures.   Psychiatric/Behavioral: Negative for altered mental status, hallucinations and suicidal ideas.   Allergic/Immunologic: Negative for HIV exposure, hives and persistent infections.           Current Outpatient Medications:   •  amLODIPine (NORVASC) 10 MG tablet, Take 1 tablet by mouth Daily., Disp: 90 tablet, Rfl: 3  •  cetirizine (zyrTEC) 10 MG tablet, Take 1 tablet by mouth Every Night., Disp: 90 tablet, Rfl: 3  •  DULoxetine (CYMBALTA) 30 MG capsule, Take 1 capsule by mouth Daily., Disp: 90 capsule, Rfl: 3  •  famotidine (Pepcid) 40 MG tablet, Take 1 tablet by mouth 2 (Two) Times a Day As Needed for Indigestion or Heartburn., Disp: 180 tablet, Rfl: 3  •  fluticasone (Flonase) 50 MCG/ACT nasal spray, 2 sprays into the nostril(s) as directed by provider Daily., Disp: 18.2 mL, Rfl: 11  •  hydrOXYzine pamoate (VISTARIL) 50 MG capsule, Take 1 capsule by mouth At Night As Needed for Anxiety (sleep)., Disp: 90 capsule, Rfl: 3  •  ibuprofen (ADVIL,MOTRIN) 800 MG tablet, Take 1 tablet by mouth Every 8 (Eight) Hours As Needed for Mild Pain  or Moderate Pain . (Patient taking differently: Take 600 mg by mouth Every 8 (Eight) Hours As Needed for Mild Pain  or Moderate Pain .), Disp: 60 tablet, Rfl: 0  •  metoprolol succinate XL (TOPROL-XL) 100 MG 24 hr tablet, Take 1 tablet by mouth Daily., Disp: 90 tablet, Rfl: 3  •  montelukast (SINGULAIR) 10 MG tablet, Take 1 tablet by mouth Every Night., Disp: 90 tablet, Rfl: 3  •  SUMAtriptan (IMITREX) 25 MG tablet, Take 25 mg by mouth 1 (One) Time As Needed., Disp: , Rfl:   •  traZODone (DESYREL) 150 MG tablet, Take 1 tablet by mouth Every Night., Disp: 90 tablet, Rfl: 3    Objective:   Vitals and nursing note reviewed.   Constitutional:        "Appearance: Healthy appearance. Not in distress.   Neck:      Vascular: No JVR. JVD normal.   Pulmonary:      Effort: Pulmonary effort is normal.      Breath sounds: Normal breath sounds. No wheezing. No rhonchi. No rales.   Chest:      Chest wall: Not tender to palpatation.   Cardiovascular:      PMI at left midclavicular line. Normal rate. Regular rhythm. Normal S1. Normal S2.      Murmurs: There is no murmur.      No gallop. No click. No rub.   Pulses:     Intact distal pulses.   Edema:     Peripheral edema absent.   Abdominal:      General: Bowel sounds are normal.      Palpations: Abdomen is soft.      Tenderness: There is no abdominal tenderness.   Musculoskeletal: Normal range of motion.         General: No tenderness. Skin:     General: Skin is warm and dry.   Neurological:      General: No focal deficit present.      Mental Status: Alert and oriented to person, place and time.       Blood pressure 120/86, pulse 86, height 165.1 cm (65\"), weight 132 kg (292 lb), SpO2 96 %, not currently breastfeeding.   Lab Review:     Assessment:       1. Essential hypertension  Acceptable blood pressure control.    2. Pre-operative cardiovascular examination  Patient may proceed with planned bariatric surgery without further delay.      ECG 12 Lead    Date/Time: 7/12/2021 10:50 AM  Performed by: Wayne Green MD  Authorized by: Wayne Green MD   Previous ECG: no previous ECG available  Rhythm: sinus rhythm  Rate: normal  QRS axis: normal  Other findings: non-specific ST-T wave changes    Clinical impression: non-specific ECG            Plan:     No changes in her medications have been made at today's visit.  No cardiovascular testing is indicated at this time.      "

## 2021-07-16 ENCOUNTER — APPOINTMENT (OUTPATIENT)
Dept: PREADMISSION TESTING | Facility: HOSPITAL | Age: 26
End: 2021-07-16

## 2021-07-16 LAB — SARS-COV-2 RNA PNL SPEC NAA+PROBE: NOT DETECTED

## 2021-07-16 PROCEDURE — U0004 COV-19 TEST NON-CDC HGH THRU: HCPCS

## 2021-07-16 PROCEDURE — C9803 HOPD COVID-19 SPEC COLLECT: HCPCS

## 2021-07-19 ENCOUNTER — LAB REQUISITION (OUTPATIENT)
Dept: LAB | Facility: HOSPITAL | Age: 26
End: 2021-07-19

## 2021-07-19 DIAGNOSIS — R12 HEARTBURN: ICD-10-CM

## 2021-07-19 PROCEDURE — 88305 TISSUE EXAM BY PATHOLOGIST: CPT | Performed by: SURGERY

## 2021-07-20 LAB
CYTO UR: NORMAL
LAB AP CASE REPORT: NORMAL
LAB AP CLINICAL INFORMATION: NORMAL
PATH REPORT.FINAL DX SPEC: NORMAL
PATH REPORT.GROSS SPEC: NORMAL

## 2021-07-26 ENCOUNTER — OFFICE VISIT (OUTPATIENT)
Dept: INTERNAL MEDICINE | Facility: CLINIC | Age: 26
End: 2021-07-26

## 2021-07-26 VITALS
SYSTOLIC BLOOD PRESSURE: 110 MMHG | HEART RATE: 84 BPM | DIASTOLIC BLOOD PRESSURE: 84 MMHG | HEIGHT: 65 IN | BODY MASS INDEX: 48.52 KG/M2 | WEIGHT: 291.2 LBS | TEMPERATURE: 98 F | OXYGEN SATURATION: 98 %

## 2021-07-26 DIAGNOSIS — Z71.89 ENCOUNTER FOR PRE-BARIATRIC SURGERY COUNSELING AND EDUCATION: Primary | ICD-10-CM

## 2021-07-26 DIAGNOSIS — M54.50 ACUTE MIDLINE LOW BACK PAIN WITHOUT SCIATICA: ICD-10-CM

## 2021-07-26 DIAGNOSIS — E66.01 CLASS 3 SEVERE OBESITY DUE TO EXCESS CALORIES WITH SERIOUS COMORBIDITY AND BODY MASS INDEX (BMI) OF 45.0 TO 49.9 IN ADULT (HCC): ICD-10-CM

## 2021-07-26 PROCEDURE — 99213 OFFICE O/P EST LOW 20 MIN: CPT | Performed by: PHYSICIAN ASSISTANT

## 2021-07-26 RX ORDER — METFORMIN HYDROCHLORIDE 500 MG/1
500 TABLET, EXTENDED RELEASE ORAL
COMMUNITY
Start: 2021-07-16 | End: 2021-07-26

## 2021-07-26 RX ORDER — BACLOFEN 10 MG/1
10 TABLET ORAL NIGHTLY PRN
Qty: 30 TABLET | Refills: 1 | Status: SHIPPED | OUTPATIENT
Start: 2021-07-26 | End: 2022-01-20 | Stop reason: SDUPTHER

## 2021-07-26 NOTE — PROGRESS NOTES
Follow Up Office Visit      Patient Name: Valery Hernandez  : 1995   MRN: [id number]     Chief Complaint:    Chief Complaint   Patient presents with   • Follow-up     pre-bariatric surgery counseling and education       History of Present Illness: Valery Hernandez is a 26 y.o. female who is here today for the 3rd of 6 required pre-bariatric surgery counseling visits. She has made a lot of diet changes over the last month and is eating much healthier in general. She is reaching protein goal of 100 g daily and 100-120 carbohydrates daily. She has lost 1 pound on our scale over the last month. She can tell her clothes are fitting better. For the last 3 weeks with exercise she has been having a lot of muscle spasms in her back which makes her knees feel like they will give out. Now even when at rest on her couch she will have similar spasms. She feels better if she rests for 1-2 days but then as soon as she resumes exercise the pain returns. No numbness or tingling in the legs.         Subjective      I have reviewed and the following portions of the patient's history were updated as appropriate: past family history, past medical history, past social history, past surgical history and problem list.      Current Outpatient Medications:   •  amLODIPine (NORVASC) 10 MG tablet, Take 1 tablet by mouth Daily., Disp: 90 tablet, Rfl: 3  •  cetirizine (zyrTEC) 10 MG tablet, Take 1 tablet by mouth Every Night., Disp: 90 tablet, Rfl: 3  •  DULoxetine (CYMBALTA) 30 MG capsule, Take 1 capsule by mouth Daily., Disp: 90 capsule, Rfl: 3  •  famotidine (Pepcid) 40 MG tablet, Take 1 tablet by mouth 2 (Two) Times a Day As Needed for Indigestion or Heartburn., Disp: 180 tablet, Rfl: 3  •  fluticasone (Flonase) 50 MCG/ACT nasal spray, 2 sprays into the nostril(s) as directed by provider Daily., Disp: 18.2 mL, Rfl: 11  •  hydrOXYzine pamoate (VISTARIL) 50 MG capsule, Take 1 capsule by mouth At Night As Needed for Anxiety (sleep).,  "Disp: 90 capsule, Rfl: 3  •  ibuprofen (ADVIL,MOTRIN) 800 MG tablet, Take 1 tablet by mouth Every 8 (Eight) Hours As Needed for Mild Pain  or Moderate Pain . (Patient taking differently: Take 600 mg by mouth Every 8 (Eight) Hours As Needed for Mild Pain  or Moderate Pain .), Disp: 60 tablet, Rfl: 0  •  metoprolol succinate XL (TOPROL-XL) 100 MG 24 hr tablet, Take 1 tablet by mouth Daily., Disp: 90 tablet, Rfl: 3  •  montelukast (SINGULAIR) 10 MG tablet, Take 1 tablet by mouth Every Night., Disp: 90 tablet, Rfl: 3  •  SUMAtriptan (IMITREX) 25 MG tablet, Take 25 mg by mouth 1 (One) Time As Needed., Disp: , Rfl:   •  traZODone (DESYREL) 150 MG tablet, Take 1 tablet by mouth Every Night., Disp: 90 tablet, Rfl: 3  •  baclofen (LIORESAL) 10 MG tablet, Take 1 tablet by mouth At Night As Needed for Muscle Spasms., Disp: 30 tablet, Rfl: 1    Allergies   Allergen Reactions   • Prozac [Fluoxetine] Myalgia   • Metformin GI Intolerance   • Topamax [Topiramate] Rash     Rash, tingling and numnbess in bilateral feet.        Objective     Physical Exam:  Vital Signs:   Vitals:    07/26/21 1340   BP: 110/84   Pulse: 84   Temp: 98 °F (36.7 °C)   SpO2: 98%   Weight: 132 kg (291 lb 3.2 oz)   Height: 165.1 cm (65\")     Body mass index is 48.46 kg/m².    Physical Exam  Vitals and nursing note reviewed.   Constitutional:       General: She is awake. She is not in acute distress.     Appearance: She is well-developed. She is morbidly obese. She is not ill-appearing, toxic-appearing or diaphoretic.      Interventions: Face mask in place.   HENT:      Head: Normocephalic and atraumatic.      Right Ear: External ear normal.      Left Ear: External ear normal.   Eyes:      General: No scleral icterus.     Extraocular Movements: Extraocular movements intact.      Conjunctiva/sclera: Conjunctivae normal.      Pupils: Pupils are equal, round, and reactive to light.   Cardiovascular:      Rate and Rhythm: Normal rate and regular rhythm.      Heart " sounds: Normal heart sounds. No murmur heard.   No friction rub. No gallop.    Pulmonary:      Effort: Pulmonary effort is normal. No respiratory distress.      Breath sounds: Normal breath sounds. No wheezing, rhonchi or rales.   Chest:      Chest wall: No tenderness.   Musculoskeletal:         General: No deformity. Normal range of motion.      Cervical back: Normal range of motion and neck supple.      Lumbar back: Spasms, tenderness (bilateral) and bony tenderness (L1-L5 and bilateral SI joints) present. No swelling, edema or lacerations. Normal range of motion. Negative right straight leg raise test and negative left straight leg raise test.      Right lower leg: No edema.      Left lower leg: No edema.   Skin:     General: Skin is warm and dry.      Capillary Refill: Capillary refill takes less than 2 seconds.      Coloration: Skin is not jaundiced or pale.      Findings: No erythema or rash.   Neurological:      General: No focal deficit present.      Mental Status: She is alert and oriented to person, place, and time.      Cranial Nerves: No cranial nerve deficit.      Sensory: No sensory deficit.      Motor: No abnormal muscle tone.      Coordination: Coordination normal.      Gait: Gait normal.      Deep Tendon Reflexes: Reflexes normal.   Psychiatric:         Mood and Affect: Mood normal.         Behavior: Behavior normal. Behavior is cooperative.         Thought Content: Thought content normal.         Judgment: Judgment normal.         Common labs    Common Labsle 4/20/21 4/20/21 4/20/21 4/20/21 5/24/21    1208 1208 1208 1208    Glucose  117 (A)      BUN  16      Creatinine  0.65      eGFR Non  Am  112      eGFR African Am  136      Sodium  137      Potassium  4.2      Chloride  101      Calcium  9.7      Total Protein  6.8      Albumin  3.90      Total Bilirubin  <0.2      Alkaline Phosphatase  87      AST (SGOT)  11      ALT (SGPT)  27      WBC   11.56 (A)  7.95   Hemoglobin   13.0  13.0    Hematocrit   38.7  39.2   Platelets   357  288   Total Cholesterol    188    Triglycerides    83    HDL Cholesterol    81 (A)    LDL Cholesterol     92    Hemoglobin A1C 5.70 (A)       (A) Abnormal value       Comments are available for some flowsheets but are not being displayed.               Assessment / Plan      Assessment/Plan:   Diagnoses and all orders for this visit:    1. Encounter for pre-bariatric surgery counseling and education (Primary)  2. Class 3 severe obesity due to excess calories with serious comorbidity and body mass index (BMI) of 45.0 to 49.9 in adult (CMS/[facility])  Goals for the next month: work on cutting soda out, drink plenty of water, increase exercise as tolerated.       3. Acute midline low back pain without sciatica  -     Begin prn: baclofen (LIORESAL) 10 MG tablet; Take 1 tablet by mouth At Night As Needed for Muscle Spasms.  Dispense: 30 tablet; Refill: 1       Return in 1 month for the 4th of 6 required pre-bariatric surgery counseling visits.       I spent 20 minutes caring for Valery on this date of service. This time includes time spent by me in the following activities:preparing for the visit, performing a medically appropriate examination and/or evaluation , counseling and educating the patient/family/caregiver, ordering medications, tests, or procedures and documenting information in the medical record    Follow Up:   No follow-ups on file.    Patient was given instructions and counseling regarding her condition or for health maintenance advice. Please see specific information pulled into the AVS if appropriate.     Juani Corey PA-C  Primary Care [address] [facility]     Please note that portions of this note may have been completed with a voice recognition program. Efforts were made to edit the dictations, but occasionally words are mistranscribed.

## 2021-08-24 ENCOUNTER — OFFICE VISIT (OUTPATIENT)
Dept: INTERNAL MEDICINE | Facility: CLINIC | Age: 26
End: 2021-08-24

## 2021-08-24 VITALS
DIASTOLIC BLOOD PRESSURE: 84 MMHG | TEMPERATURE: 97.7 F | HEART RATE: 110 BPM | HEIGHT: 65 IN | WEIGHT: 293 LBS | BODY MASS INDEX: 48.82 KG/M2 | OXYGEN SATURATION: 98 % | SYSTOLIC BLOOD PRESSURE: 122 MMHG

## 2021-08-24 DIAGNOSIS — H65.93 OME (OTITIS MEDIA WITH EFFUSION), BILATERAL: ICD-10-CM

## 2021-08-24 DIAGNOSIS — E66.01 CLASS 3 SEVERE OBESITY DUE TO EXCESS CALORIES WITH SERIOUS COMORBIDITY AND BODY MASS INDEX (BMI) OF 45.0 TO 49.9 IN ADULT (HCC): ICD-10-CM

## 2021-08-24 DIAGNOSIS — Z71.89 ENCOUNTER FOR PRE-BARIATRIC SURGERY COUNSELING AND EDUCATION: Primary | ICD-10-CM

## 2021-08-24 DIAGNOSIS — M26.622 ARTHRALGIA OF LEFT TEMPOROMANDIBULAR JOINT: ICD-10-CM

## 2021-08-24 PROCEDURE — 99213 OFFICE O/P EST LOW 20 MIN: CPT | Performed by: PHYSICIAN ASSISTANT

## 2021-08-24 NOTE — PROGRESS NOTES
Follow Up Office Visit      Patient Name: Valery Hernandez  : 1995   MRN: [id number]     Chief Complaint:    Chief Complaint   Patient presents with   • Follow-up     Encounter for pre-bariatric surgery counseling and education   • Ear Problem     sinus pressure and ear issue       History of Present Illness: Valery Hernandez is a 26 y.o. female who is here today for the fourth of 6 required prebariatric surgery counseling visits. 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 She has not been able to follow the recommended diet very well at all. She has had good water intake.  She has only exercised a couple of times over the last month but has been very active with moving and unpacking.    She has been experiencing left ear troubles for long while with pressure and fullness which seems to have worsened and now includes left-sided sinus pressure. She has been using Flonase daily which is not helping much.  She does endorse grinding her teeth but does wear a bite guard nightly which has helped reduce migraine frequency.        Subjective      I have reviewed and the following portions of the patient's history were updated as appropriate: past family history, past medical history, past social history, past surgical history and problem list.      Current Outpatient Medications:   •  amLODIPine (NORVASC) 10 MG tablet, Take 1 tablet by mouth Daily., Disp: 90 tablet, Rfl: 3  •  baclofen (LIORESAL) 10 MG tablet, Take 1 tablet by mouth At Night As Needed for Muscle Spasms., Disp: 30 tablet, Rfl: 1  •  cetirizine (zyrTEC) 10 MG tablet, Take 1 tablet by mouth Every Night., Disp: 90 tablet, Rfl: 3  •  DULoxetine (CYMBALTA) 30 MG capsule, Take 1 capsule by mouth Daily., Disp: 90 capsule, Rfl: 3  •  famotidine (Pepcid) 40 MG tablet, Take 1 tablet by mouth 2 (Two) Times a Day As Needed for Indigestion or Heartburn., Disp: 180 tablet, Rfl: 3  •  fluticasone (Flonase) 50 MCG/ACT  "nasal spray, 2 sprays into the nostril(s) as directed by provider Daily., Disp: 18.2 mL, Rfl: 11  •  hydrOXYzine pamoate (VISTARIL) 50 MG capsule, Take 1 capsule by mouth At Night As Needed for Anxiety (sleep)., Disp: 90 capsule, Rfl: 3  •  ibuprofen (ADVIL,MOTRIN) 800 MG tablet, Take 1 tablet by mouth Every 8 (Eight) Hours As Needed for Mild Pain  or Moderate Pain . (Patient taking differently: Take 600 mg by mouth Every 8 (Eight) Hours As Needed for Mild Pain  or Moderate Pain .), Disp: 60 tablet, Rfl: 0  •  metoprolol succinate XL (TOPROL-XL) 100 MG 24 hr tablet, Take 1 tablet by mouth Daily., Disp: 90 tablet, Rfl: 3  •  montelukast (SINGULAIR) 10 MG tablet, Take 1 tablet by mouth Every Night., Disp: 90 tablet, Rfl: 3  •  SUMAtriptan (IMITREX) 25 MG tablet, Take 25 mg by mouth 1 (One) Time As Needed., Disp: , Rfl:   •  traZODone (DESYREL) 150 MG tablet, Take 1 tablet by mouth Every Night., Disp: 90 tablet, Rfl: 3    Allergies   Allergen Reactions   • Prozac [Fluoxetine] Myalgia   • Metformin GI Intolerance   • Topamax [Topiramate] Rash     Rash, tingling and numnbess in bilateral feet.        Objective     Physical Exam:  Vital Signs:   Vitals:    08/24/21 1541 08/24/21 1554   BP:  122/84   Pulse: 110    Temp: 97.7 °F (36.5 °C)    SpO2: 98%    Weight: 134 kg (296 lb)    Height: 165.1 cm (65\")      Body mass index is 49.26 kg/m².    Physical Exam  Vitals and nursing note reviewed.   Constitutional:       General: She is awake. She is not in acute distress.     Appearance: Normal appearance. She is well-developed. She is morbidly obese. She is not ill-appearing, toxic-appearing or diaphoretic.      Interventions: Face mask in place.   HENT:      Head: Normocephalic and atraumatic.      Right Ear: Ear canal and external ear normal. There is no impacted cerumen.      Left Ear: Ear canal and external ear normal. There is no impacted cerumen.      Ears:      Comments: Left TMJ tenderness  Very mild bilateral TM " effusions.   No sinus tenderness.      Mouth/Throat:      Mouth: Mucous membranes are moist.      Pharynx: No oropharyngeal exudate or posterior oropharyngeal erythema.   Eyes:      General: No scleral icterus.        Right eye: No discharge.         Left eye: No discharge.      Extraocular Movements: Extraocular movements intact.      Conjunctiva/sclera: Conjunctivae normal.      Pupils: Pupils are equal, round, and reactive to light.   Cardiovascular:      Rate and Rhythm: Normal rate and regular rhythm.      Heart sounds: Normal heart sounds. No murmur heard.   No friction rub. No gallop.    Pulmonary:      Effort: Pulmonary effort is normal. No respiratory distress.      Breath sounds: Normal breath sounds. No wheezing, rhonchi or rales.   Chest:      Chest wall: No tenderness.   Abdominal:      General: Bowel sounds are normal.      Palpations: Abdomen is soft.      Tenderness: There is no abdominal tenderness. There is no right CVA tenderness or left CVA tenderness.   Musculoskeletal:         General: No tenderness or deformity. Normal range of motion.      Cervical back: Normal range of motion and neck supple. No rigidity or tenderness.      Right lower leg: No edema.      Left lower leg: No edema.   Lymphadenopathy:      Cervical: No cervical adenopathy.   Skin:     General: Skin is warm and dry.      Capillary Refill: Capillary refill takes less than 2 seconds.      Coloration: Skin is not jaundiced or pale.      Findings: No erythema or rash.   Neurological:      General: No focal deficit present.      Mental Status: She is alert and oriented to person, place, and time.      Cranial Nerves: No cranial nerve deficit.      Sensory: No sensory deficit.      Motor: No abnormal muscle tone.      Coordination: Coordination normal.      Gait: Gait normal.      Deep Tendon Reflexes: Reflexes normal.   Psychiatric:         Mood and Affect: Mood normal.         Behavior: Behavior normal. Behavior is cooperative.          Thought Content: Thought content normal.         Judgment: Judgment normal.         Common labs    Common Labsle 4/20/21 4/20/21 4/20/21 4/20/21 5/24/21    1208 1208 1208 1208    Glucose  117 (A)      BUN  16      Creatinine  0.65      eGFR Non  Am  112      eGFR African Am  136      Sodium  137      Potassium  4.2      Chloride  101      Calcium  9.7      Total Protein  6.8      Albumin  3.90      Total Bilirubin  <0.2      Alkaline Phosphatase  87      AST (SGOT)  11      ALT (SGPT)  27      WBC   11.56 (A)  7.95   Hemoglobin   13.0  13.0   Hematocrit   38.7  39.2   Platelets   357  288   Total Cholesterol    188    Triglycerides    83    HDL Cholesterol    81 (A)    LDL Cholesterol     92    Hemoglobin A1C 5.70 (A)       (A) Abnormal value       Comments are available for some flowsheets but are not being displayed.               Assessment / Plan      Assessment/Plan:   Diagnoses and all orders for this visit:    1. Encounter for pre-bariatric surgery counseling and education (Primary)  2. Class 3 severe obesity due to excess calories with serious comorbidity and body mass index (BMI) of 45.0 to 49.9 in adult (CMS/[facility])  Patient's (Body mass index is 49.26 kg/m².) indicates that they are morbidly obese (BMI > 40 or > 35 with obesity - related health condition) with health related conditions that include hypertension . Weight is unchanged. BMI is is above average; BMI management plan is completed. We discussed low calorie, low carb based diet program, portion control, increasing exercise and consulting a Bariatric surgeon.     Goals for the next month: get back on track with diet, increase exercise, eat out less often, consume plenty of water.    3. Arthralgia of left temporomandibular joint  Be sure to use bite guard nightly and follow-up with dentist to see a new bite guard is necessary.    4. OME (otitis media with effusion), bilateral  Continue Flonase, Singulair and Zyrtec daily.              Follow Up:   Return in about 1 month (around 9/24/2021) for Next scheduled follow up weight check.    Patient was given instructions and counseling regarding her condition or for health maintenance advice. Please see specific information pulled into the AVS if appropriate.     Juani Corey PA-C  [facility]     Please note that portions of this note may have been completed with a voice recognition program. Efforts were made to edit the dictations, but occasionally words are mistranscribed.

## 2021-09-21 ENCOUNTER — OFFICE VISIT (OUTPATIENT)
Dept: INTERNAL MEDICINE | Facility: CLINIC | Age: 26
End: 2021-09-21

## 2021-09-21 VITALS
BODY MASS INDEX: 48.82 KG/M2 | OXYGEN SATURATION: 99 % | WEIGHT: 293 LBS | SYSTOLIC BLOOD PRESSURE: 134 MMHG | HEIGHT: 65 IN | HEART RATE: 113 BPM | DIASTOLIC BLOOD PRESSURE: 92 MMHG | TEMPERATURE: 97.7 F

## 2021-09-21 DIAGNOSIS — F41.8 DEPRESSION WITH ANXIETY: ICD-10-CM

## 2021-09-21 DIAGNOSIS — L70.0 ACNE VULGARIS: ICD-10-CM

## 2021-09-21 DIAGNOSIS — Z71.89 ENCOUNTER FOR PRE-BARIATRIC SURGERY COUNSELING AND EDUCATION: Primary | ICD-10-CM

## 2021-09-21 DIAGNOSIS — E66.01 CLASS 3 SEVERE OBESITY DUE TO EXCESS CALORIES WITH SERIOUS COMORBIDITY AND BODY MASS INDEX (BMI) OF 45.0 TO 49.9 IN ADULT (HCC): ICD-10-CM

## 2021-09-21 PROCEDURE — 99213 OFFICE O/P EST LOW 20 MIN: CPT | Performed by: PHYSICIAN ASSISTANT

## 2021-09-21 RX ORDER — DULOXETIN HYDROCHLORIDE 60 MG/1
60 CAPSULE, DELAYED RELEASE ORAL DAILY
Qty: 90 CAPSULE | Refills: 1 | Status: SHIPPED | OUTPATIENT
Start: 2021-09-21 | End: 2022-01-20 | Stop reason: SDUPTHER

## 2021-09-21 RX ORDER — NORGESTIMATE AND ETHINYL ESTRADIOL 7DAYSX3 28
1 KIT ORAL DAILY
Qty: 28 TABLET | Refills: 11 | Status: SHIPPED | OUTPATIENT
Start: 2021-09-21 | End: 2022-01-20 | Stop reason: ALTCHOICE

## 2021-09-21 NOTE — PROGRESS NOTES
Follow Up Office Visit      Patient Name: Valery Hernandez  : 1995   MRN: [id number]     Chief Complaint:    Chief Complaint   Patient presents with   • Follow-up     Encounter for pre-bariatric surgery counseling and education, discuss increasing duloxetine       History of Present Illness: Valery Hernandez is a 26 y.o. female who is here today the 5th of 6 required prebariatric surgery counseling visits. She has resumed exercising multiple times per week. She is drinking more water and cutting out sweets. She has been eating a smaller meal at night.  She is still struggling with consuming too many carbohydrates.    Acne bothersome since stopping OCP, would like to resume. LMP occurred a few weeks ago.  No chance of pregnancy as her partner is female.    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  She is interesting in increasing the Cymbalta dose.      Subjective      I have reviewed and the following portions of the patient's history were updated as appropriate: past family history, past medical history, past social history, past surgical history and problem list.      Current Outpatient Medications:   •  amLODIPine (NORVASC) 10 MG tablet, Take 1 tablet by mouth Daily., Disp: 90 tablet, Rfl: 3  •  baclofen (LIORESAL) 10 MG tablet, Take 1 tablet by mouth At Night As Needed for Muscle Spasms., Disp: 30 tablet, Rfl: 1  •  cetirizine (zyrTEC) 10 MG tablet, Take 1 tablet by mouth Every Night., Disp: 90 tablet, Rfl: 3  •  DULoxetine (CYMBALTA) 60 MG capsule, Take 1 capsule by mouth Daily., Disp: 90 capsule, Rfl: 1  •  famotidine (Pepcid) 40 MG tablet, Take 1 tablet by mouth 2 (Two) Times a Day As Needed for Indigestion or Heartburn., Disp: 180 tablet, Rfl: 3  •  fluticasone (Flonase) 50 MCG/ACT nasal spray, 2 sprays into the nostril(s) as directed by provider Daily., Disp: 18.2 mL, Rfl: 11  •  hydrOXYzine pamoate (VISTARIL) 50  "MG capsule, Take 1 capsule by mouth At Night As Needed for Anxiety (sleep)., Disp: 90 capsule, Rfl: 3  •  ibuprofen (ADVIL,MOTRIN) 800 MG tablet, Take 1 tablet by mouth Every 8 (Eight) Hours As Needed for Mild Pain  or Moderate Pain . (Patient taking differently: Take 600 mg by mouth Every 8 (Eight) Hours As Needed for Mild Pain  or Moderate Pain .), Disp: 60 tablet, Rfl: 0  •  metoprolol succinate XL (TOPROL-XL) 100 MG 24 hr tablet, Take 1 tablet by mouth Daily., Disp: 90 tablet, Rfl: 3  •  montelukast (SINGULAIR) 10 MG tablet, Take 1 tablet by mouth Every Night., Disp: 90 tablet, Rfl: 3  •  SUMAtriptan (IMITREX) 25 MG tablet, Take 25 mg by mouth 1 (One) Time As Needed., Disp: , Rfl:   •  traZODone (DESYREL) 150 MG tablet, Take 1 tablet by mouth Every Night., Disp: 90 tablet, Rfl: 3  •  norgestimate-ethinyl estradiol (Tri-Sprintec) 0.18/0.215/0.25 MG-35 MCG per tablet, Take 1 tablet by mouth Daily., Disp: 28 tablet, Rfl: 11    Allergies   Allergen Reactions   • Prozac [Fluoxetine] Myalgia   • Metformin GI Intolerance   • Topamax [Topiramate] Rash     Rash, tingling and numnbess in bilateral feet.        Objective     Physical Exam:  Vital Signs:   Vitals:    09/21/21 1504   BP: 134/92   Pulse: 113   Temp: 97.7 °F (36.5 °C)   SpO2: 99%   Weight: 134 kg (296 lb)   Height: 165.1 cm (65\")     Body mass index is 49.26 kg/m².    Physical Exam  Vitals and nursing note reviewed.   Constitutional:       General: She is awake. She is not in acute distress.     Appearance: She is well-developed. She is morbidly obese. She is not ill-appearing, toxic-appearing or diaphoretic.      Interventions: Face mask in place.   HENT:      Head: Normocephalic and atraumatic.      Right Ear: External ear normal.      Left Ear: External ear normal.   Eyes:      General: No scleral icterus.     Extraocular Movements: Extraocular movements intact.      Conjunctiva/sclera: Conjunctivae normal.      Pupils: Pupils are equal, round, and reactive " to light.   Cardiovascular:      Rate and Rhythm: Normal rate and regular rhythm.      Heart sounds: Normal heart sounds. No murmur heard.   No friction rub. No gallop.    Pulmonary:      Effort: Pulmonary effort is normal. No respiratory distress.      Breath sounds: Normal breath sounds. No wheezing, rhonchi or rales.   Chest:      Chest wall: No tenderness.   Abdominal:      General: Bowel sounds are normal.      Palpations: Abdomen is soft.      Tenderness: There is no abdominal tenderness.      Comments: Obese abdomen.   Musculoskeletal:         General: No tenderness or deformity. Normal range of motion.      Cervical back: Normal range of motion and neck supple.      Right lower leg: No edema.      Left lower leg: No edema.   Skin:     General: Skin is warm and dry.      Capillary Refill: Capillary refill takes less than 2 seconds.      Findings: Acne (mild) present. No rash.   Neurological:      Mental Status: She is alert and oriented to person, place, and time.      Cranial Nerves: No cranial nerve deficit.      Sensory: No sensory deficit.      Motor: No abnormal muscle tone.      Coordination: Coordination normal.      Deep Tendon Reflexes: Reflexes normal.   Psychiatric:         Attention and Perception: Attention and perception normal.         Mood and Affect: Affect normal. Mood is anxious. Mood is not depressed.         Speech: Speech normal.         Behavior: Behavior normal. Behavior is cooperative.         Thought Content: Thought content normal.         Cognition and Memory: Cognition and memory normal.         Judgment: Judgment normal.         Common labs    Common Labsle 4/20/21 4/20/21 4/20/21 4/20/21 5/24/21    1208 1208 1208 1208    Glucose  117 (A)      BUN  16      Creatinine  0.65      eGFR Non  Am  112      eGFR African Am  136      Sodium  137      Potassium  4.2      Chloride  101      Calcium  9.7      Total Protein  6.8      Albumin  3.90      Total Bilirubin  <0.2       Alkaline Phosphatase  87      AST (SGOT)  11      ALT (SGPT)  27      WBC   11.56 (A)  7.95   Hemoglobin   13.0  13.0   Hematocrit   38.7  39.2   Platelets   357  288   Total Cholesterol    188    Triglycerides    83    HDL Cholesterol    81 (A)    LDL Cholesterol     92    Hemoglobin A1C 5.70 (A)       (A) Abnormal value       Comments are available for some flowsheets but are not being displayed.               Assessment / Plan      Assessment/Plan:   Diagnoses and all orders for this visit:    1. Encounter for pre-bariatric surgery counseling and education (Primary)  2. Class 3 severe obesity due to excess calories with serious comorbidity and body mass index (BMI) of 45.0 to 49.9 in adult (CMS/[facility])    Goals for the next month: further reduce carbohydrate intake, choose healthier carbohydrates, continue exercising a few days per week.     Patient's (Body mass index is 49.26 kg/m².) indicates that they are morbidly obese (BMI > 40 or > 35 with obesity - related health condition) with health related conditions that include hypertension and GERD . Weight is unchanged. BMI is is above average; BMI management plan is completed. We discussed low calorie, low carb based diet program, portion control, increasing exercise and consulting a Bariatric surgeon.       3. Depression with anxiety  -     Dose increase: DULoxetine (CYMBALTA) 60 MG capsule; Take 1 capsule by mouth Daily.  Dispense: 90 capsule; Refill: 1    4. Acne vulgaris  -     Resume: norgestimate-ethinyl estradiol (Tri-Sprintec) 0.18/0.215/0.25 MG-35 MCG per tablet; Take 1 tablet by mouth Daily.  Dispense: 28 tablet; Refill: 11  Begin on Sunday after starting next period.   Discussed risks, benefits and potential side effects of medication.  Discussed proper administration of medication as well as what to do if doses are missed. Discussed the dangers of smoking in combination with oral contraceptive use and she voiced understanding. Discussed that oral  contraceptives are not 100% effective in preventing pregnancy and do not prevent ANY sexually transmitted diseases.           Return in October for the 6th of 6 required prebariatric surgery counseling visits.       Follow Up:   Return in about 1 month (around 10/21/2021) for Next scheduled follow up bariatric.    Patient was given instructions and counseling regarding her condition or for health maintenance advice. Please see specific information pulled into the AVS if appropriate.     Juani Corey PA-C  [facility]     Please note that portions of this note may have been completed with a voice recognition program. Efforts were made to edit the dictations, but occasionally words are mistranscribed.

## 2021-10-19 ENCOUNTER — OFFICE VISIT (OUTPATIENT)
Dept: INTERNAL MEDICINE | Facility: CLINIC | Age: 26
End: 2021-10-19

## 2021-10-19 VITALS
OXYGEN SATURATION: 98 % | HEIGHT: 65 IN | WEIGHT: 291 LBS | BODY MASS INDEX: 48.48 KG/M2 | HEART RATE: 95 BPM | TEMPERATURE: 97.8 F | DIASTOLIC BLOOD PRESSURE: 82 MMHG | SYSTOLIC BLOOD PRESSURE: 122 MMHG

## 2021-10-19 DIAGNOSIS — Z23 NEED FOR INFLUENZA VACCINATION: ICD-10-CM

## 2021-10-19 DIAGNOSIS — E66.01 CLASS 3 SEVERE OBESITY DUE TO EXCESS CALORIES WITH SERIOUS COMORBIDITY AND BODY MASS INDEX (BMI) OF 45.0 TO 49.9 IN ADULT (HCC): ICD-10-CM

## 2021-10-19 DIAGNOSIS — Z71.89 ENCOUNTER FOR PRE-BARIATRIC SURGERY COUNSELING AND EDUCATION: Primary | ICD-10-CM

## 2021-10-19 PROCEDURE — 90471 IMMUNIZATION ADMIN: CPT | Performed by: PHYSICIAN ASSISTANT

## 2021-10-19 PROCEDURE — 90686 IIV4 VACC NO PRSV 0.5 ML IM: CPT | Performed by: PHYSICIAN ASSISTANT

## 2021-10-19 PROCEDURE — 99213 OFFICE O/P EST LOW 20 MIN: CPT | Performed by: PHYSICIAN ASSISTANT

## 2021-10-19 NOTE — PROGRESS NOTES
Follow Up Office Visit      Patient Name: Valery Hernandez  : 1995   MRN: [id number]     Chief Complaint:    Chief Complaint   Patient presents with   • Follow-up     Encounter for pre-bariatric surgery counseling and education       History of Present Illness: Valery Hernandez is a 26 y.o. female who is here today for  The 6th of 6 required pre-bariatric surgery counseling visits. She set goals last month to further reduce carbohydrate intake, choose healthier carbohydrates, and continue exercising a few days per week.  Today she states the last month has gone much better for her as she has been exercising regularly and made better diet choices. She has cut out a lot of carbohydrates and nearly completely eliminated sweets.  She has lost 5 pounds in the last month.           Subjective      I have reviewed and the following portions of the patient's history were updated as appropriate: past family history, past medical history, past social history, past surgical history and problem list.      Current Outpatient Medications:   •  amLODIPine (NORVASC) 10 MG tablet, Take 1 tablet by mouth Daily., Disp: 90 tablet, Rfl: 3  •  baclofen (LIORESAL) 10 MG tablet, Take 1 tablet by mouth At Night As Needed for Muscle Spasms., Disp: 30 tablet, Rfl: 1  •  cetirizine (zyrTEC) 10 MG tablet, Take 1 tablet by mouth Every Night., Disp: 90 tablet, Rfl: 3  •  DULoxetine (CYMBALTA) 60 MG capsule, Take 1 capsule by mouth Daily., Disp: 90 capsule, Rfl: 1  •  famotidine (Pepcid) 40 MG tablet, Take 1 tablet by mouth 2 (Two) Times a Day As Needed for Indigestion or Heartburn., Disp: 180 tablet, Rfl: 3  •  fluticasone (Flonase) 50 MCG/ACT nasal spray, 2 sprays into the nostril(s) as directed by provider Daily., Disp: 18.2 mL, Rfl: 11  •  hydrOXYzine pamoate (VISTARIL) 50 MG capsule, Take 1 capsule by mouth At Night As Needed for Anxiety (sleep)., Disp: 90 capsule, Rfl: 3  •  ibuprofen (ADVIL,MOTRIN) 800 MG tablet, Take 1 tablet by  "mouth Every 8 (Eight) Hours As Needed for Mild Pain  or Moderate Pain . (Patient taking differently: Take 600 mg by mouth Every 8 (Eight) Hours As Needed for Mild Pain  or Moderate Pain .), Disp: 60 tablet, Rfl: 0  •  metoprolol succinate XL (TOPROL-XL) 100 MG 24 hr tablet, Take 1 tablet by mouth Daily., Disp: 90 tablet, Rfl: 3  •  montelukast (SINGULAIR) 10 MG tablet, Take 1 tablet by mouth Every Night., Disp: 90 tablet, Rfl: 3  •  norgestimate-ethinyl estradiol (Tri-Sprintec) 0.18/0.215/0.25 MG-35 MCG per tablet, Take 1 tablet by mouth Daily., Disp: 28 tablet, Rfl: 11  •  SUMAtriptan (IMITREX) 25 MG tablet, Take 25 mg by mouth 1 (One) Time As Needed., Disp: , Rfl:   •  traZODone (DESYREL) 150 MG tablet, Take 1 tablet by mouth Every Night., Disp: 90 tablet, Rfl: 3    Allergies   Allergen Reactions   • Prozac [Fluoxetine] Myalgia   • Metformin GI Intolerance   • Topamax [Topiramate] Rash     Rash, tingling and numnbess in bilateral feet.        Objective     Physical Exam:  Vital Signs:   Vitals:    10/19/21 1505   BP: 122/82   Pulse: 95   Temp: 97.8 °F (36.6 °C)   SpO2: 98%   Weight: 132 kg (291 lb)   Height: 165.1 cm (65\")     Body mass index is 48.42 kg/m².    Physical Exam  Vitals and nursing note reviewed.   Constitutional:       General: She is awake. She is not in acute distress.     Appearance: Normal appearance. She is well-developed and well-groomed. She is morbidly obese. She is not ill-appearing, toxic-appearing or diaphoretic.      Interventions: Face mask in place.   HENT:      Head: Normocephalic and atraumatic.      Right Ear: External ear normal.      Left Ear: External ear normal.   Eyes:      General: No scleral icterus.     Extraocular Movements: Extraocular movements intact.      Conjunctiva/sclera: Conjunctivae normal.      Pupils: Pupils are equal, round, and reactive to light.   Cardiovascular:      Rate and Rhythm: Normal rate and regular rhythm.      Heart sounds: Normal heart sounds. No " murmur heard.  No friction rub. No gallop.    Pulmonary:      Effort: Pulmonary effort is normal. No respiratory distress.      Breath sounds: Normal breath sounds. No wheezing, rhonchi or rales.   Chest:      Chest wall: No tenderness.   Abdominal:      General: Bowel sounds are normal.      Palpations: Abdomen is soft.      Tenderness: There is no abdominal tenderness. There is no right CVA tenderness or left CVA tenderness.   Musculoskeletal:         General: No tenderness or deformity. Normal range of motion.      Cervical back: Normal range of motion and neck supple. No tenderness.      Right lower leg: No edema.      Left lower leg: No edema.   Lymphadenopathy:      Cervical: No cervical adenopathy.   Skin:     General: Skin is warm and dry.      Capillary Refill: Capillary refill takes less than 2 seconds.      Coloration: Skin is not jaundiced or pale.      Findings: No rash.   Neurological:      General: No focal deficit present.      Mental Status: She is alert and oriented to person, place, and time.      Cranial Nerves: Cranial nerves are intact. No cranial nerve deficit, dysarthria or facial asymmetry.      Sensory: Sensation is intact. No sensory deficit.      Motor: Motor function is intact. No abnormal muscle tone.      Coordination: Coordination is intact. Coordination normal.      Gait: Gait normal.      Deep Tendon Reflexes: Reflexes normal.   Psychiatric:         Attention and Perception: Attention and perception normal. She is attentive.         Mood and Affect: Mood and affect normal.         Speech: Speech normal.         Behavior: Behavior normal. Behavior is cooperative.         Thought Content: Thought content normal. Thought content is not paranoid or delusional. Thought content does not include homicidal or suicidal ideation. Thought content does not include homicidal plan.         Cognition and Memory: Cognition and memory normal.         Judgment: Judgment normal.         Common labs     Common Labsle 4/20/21 4/20/21 4/20/21 4/20/21 5/24/21    1208 1208 1208 1208    Glucose  117 (A)      BUN  16      Creatinine  0.65      eGFR Non  Am  112      eGFR African Am  136      Sodium  137      Potassium  4.2      Chloride  101      Calcium  9.7      Total Protein  6.8      Albumin  3.90      Total Bilirubin  <0.2      Alkaline Phosphatase  87      AST (SGOT)  11      ALT (SGPT)  27      WBC   11.56 (A)  7.95   Hemoglobin   13.0  13.0   Hematocrit   38.7  39.2   Platelets   357  288   Total Cholesterol    188    Triglycerides    83    HDL Cholesterol    81 (A)    LDL Cholesterol     92    Hemoglobin A1C 5.70 (A)       (A) Abnormal value       Comments are available for some flowsheets but are not being displayed.               Assessment / Plan      Assessment/Plan:   Diagnoses and all orders for this visit:    1. Encounter for pre-bariatric surgery counseling and education (Primary)    2. Class 3 severe obesity due to excess calories with serious comorbidity and body mass index (BMI) of 45.0 to 49.9 in adult (HCC)    3. Need for influenza vaccination  -     FluLaval/Fluarix/Fluzone >6 Months (0407-6015)       Goals leading up to surgery: incorporate a protein shake into daily diet, continue to reduce carbohydrate intake, continue to exercise 4 days per week.    Patient's (Body mass index is 48.42 kg/m².) indicates that they are morbidly obese (BMI > 40 or > 35 with obesity - related health condition) with health related conditions that include hypertension and GERD . Weight is improving with lifestyle modifications. BMI is is above average; BMI management plan is completed. We discussed low calorie, low carb based diet program, portion control, increasing exercise and consulting a Bariatric surgeon.       I spent 20 minutes caring for Valery on this date of service. This time includes time spent by me in the following activities:preparing for the visit, reviewing tests, performing a medically  appropriate examination and/or evaluation , counseling and educating the patient/family/caregiver, documenting information in the medical record and care coordination    Follow Up:   No follow-ups on file.    Patient was given instructions and counseling regarding her condition or for health maintenance advice. Please see specific information pulled into the AVS if appropriate.     Juani Corey PA-C  [facility]     Please note that portions of this note may have been completed with a voice recognition program. Efforts were made to edit the dictations, but occasionally words are mistranscribed.

## 2021-10-29 ENCOUNTER — PATIENT MESSAGE (OUTPATIENT)
Dept: INTERNAL MEDICINE | Facility: CLINIC | Age: 26
End: 2021-10-29

## 2021-11-30 DIAGNOSIS — R06.00 DYSPNEA, UNSPECIFIED TYPE: Primary | ICD-10-CM

## 2021-11-30 DIAGNOSIS — R53.83 FATIGUE, UNSPECIFIED TYPE: ICD-10-CM

## 2021-12-20 ENCOUNTER — HOSPITAL ENCOUNTER (OUTPATIENT)
Dept: GENERAL RADIOLOGY | Facility: HOSPITAL | Age: 26
Discharge: HOME | End: 2021-12-20
Admitting: PHYSICIAN ASSISTANT

## 2021-12-20 DIAGNOSIS — R53.83 FATIGUE, UNSPECIFIED TYPE: ICD-10-CM

## 2021-12-20 DIAGNOSIS — R06.00 DYSPNEA, UNSPECIFIED TYPE: ICD-10-CM

## 2021-12-20 PROCEDURE — 71046 X-RAY EXAM CHEST 2 VIEWS: CPT

## 2021-12-21 LAB
ALBUMIN SERPL-MCNC: 4 G/DL (ref 3.5–5.2)
ALBUMIN/GLOB SERPL: 1.4 G/DL
ALP SERPL-CCNC: 101 U/L (ref 39–117)
ALT SERPL-CCNC: 36 U/L (ref 1–33)
AST SERPL-CCNC: 23 U/L (ref 1–32)
BASOPHILS # BLD AUTO: 0.05 10*3/MM3 (ref 0–0.2)
BASOPHILS NFR BLD AUTO: 0.6 % (ref 0–1.5)
BILIRUB SERPL-MCNC: 0.3 MG/DL (ref 0–1.2)
BUN SERPL-MCNC: 9 MG/DL (ref 6–20)
BUN/CREAT SERPL: 13.4 (ref 7–25)
CALCIUM SERPL-MCNC: 9.7 MG/DL (ref 8.6–10.5)
CHLORIDE SERPL-SCNC: 102 MMOL/L (ref 98–107)
CO2 SERPL-SCNC: 29.2 MMOL/L (ref 22–29)
CREAT SERPL-MCNC: 0.67 MG/DL (ref 0.57–1)
EOSINOPHIL # BLD AUTO: 0.12 10*3/MM3 (ref 0–0.4)
EOSINOPHIL NFR BLD AUTO: 1.5 % (ref 0.3–6.2)
ERYTHROCYTE [DISTWIDTH] IN BLOOD BY AUTOMATED COUNT: 12.8 % (ref 12.3–15.4)
GLOBULIN SER CALC-MCNC: 2.9 GM/DL
GLUCOSE SERPL-MCNC: 96 MG/DL (ref 65–99)
HCT VFR BLD AUTO: 38 % (ref 34–46.6)
HGB BLD-MCNC: 12.3 G/DL (ref 12–15.9)
IMM GRANULOCYTES # BLD AUTO: 0.03 10*3/MM3 (ref 0–0.05)
IMM GRANULOCYTES NFR BLD AUTO: 0.4 % (ref 0–0.5)
LYMPHOCYTES # BLD AUTO: 2.75 10*3/MM3 (ref 0.7–3.1)
LYMPHOCYTES NFR BLD AUTO: 33.7 % (ref 19.6–45.3)
MCH RBC QN AUTO: 27 PG (ref 26.6–33)
MCHC RBC AUTO-ENTMCNC: 32.4 G/DL (ref 31.5–35.7)
MCV RBC AUTO: 83.5 FL (ref 79–97)
MONOCYTES # BLD AUTO: 0.44 10*3/MM3 (ref 0.1–0.9)
MONOCYTES NFR BLD AUTO: 5.4 % (ref 5–12)
NEUTROPHILS # BLD AUTO: 4.77 10*3/MM3 (ref 1.7–7)
NEUTROPHILS NFR BLD AUTO: 58.4 % (ref 42.7–76)
NRBC BLD AUTO-RTO: 0 /100 WBC (ref 0–0.2)
PLATELET # BLD AUTO: 349 10*3/MM3 (ref 140–450)
POTASSIUM SERPL-SCNC: 4.5 MMOL/L (ref 3.5–5.2)
PROT SERPL-MCNC: 6.9 G/DL (ref 6–8.5)
RBC # BLD AUTO: 4.55 10*6/MM3 (ref 3.77–5.28)
SODIUM SERPL-SCNC: 139 MMOL/L (ref 136–145)
WBC # BLD AUTO: 8.16 10*3/MM3 (ref 3.4–10.8)

## 2021-12-30 ENCOUNTER — TELEPHONE (OUTPATIENT)
Dept: INTERNAL MEDICINE | Facility: CLINIC | Age: 26
End: 2021-12-30

## 2021-12-30 NOTE — TELEPHONE ENCOUNTER
Called pt to inform her we have got her nexplannon and are ready to schedule her procedure, pt stated she will call us back to schedule the appointment.

## 2022-01-10 ENCOUNTER — CONSULT (OUTPATIENT)
Dept: BARIATRICS/WEIGHT MGMT | Facility: CLINIC | Age: 27
End: 2022-01-10

## 2022-01-10 VITALS
SYSTOLIC BLOOD PRESSURE: 116 MMHG | OXYGEN SATURATION: 99 % | DIASTOLIC BLOOD PRESSURE: 62 MMHG | TEMPERATURE: 97.9 F | HEART RATE: 94 BPM | WEIGHT: 293 LBS | HEIGHT: 65 IN | BODY MASS INDEX: 48.82 KG/M2 | RESPIRATION RATE: 18 BRPM

## 2022-01-10 DIAGNOSIS — E66.01 MORBID OBESITY WITH BODY MASS INDEX OF 45.0-49.9 IN ADULT: Primary | ICD-10-CM

## 2022-01-10 DIAGNOSIS — K44.9 HIATAL HERNIA WITH GASTROESOPHAGEAL REFLUX: ICD-10-CM

## 2022-01-10 DIAGNOSIS — K21.9 HIATAL HERNIA WITH GASTROESOPHAGEAL REFLUX: ICD-10-CM

## 2022-01-10 PROCEDURE — 99214 OFFICE O/P EST MOD 30 MIN: CPT | Performed by: SURGERY

## 2022-01-10 RX ORDER — SODIUM CHLORIDE 0.9 % (FLUSH) 0.9 %
3-10 SYRINGE (ML) INJECTION AS NEEDED
Status: CANCELLED | OUTPATIENT
Start: 2022-01-10

## 2022-01-10 RX ORDER — SODIUM CHLORIDE, SODIUM LACTATE, POTASSIUM CHLORIDE, CALCIUM CHLORIDE 600; 310; 30; 20 MG/100ML; MG/100ML; MG/100ML; MG/100ML
150 INJECTION, SOLUTION INTRAVENOUS CONTINUOUS
Status: CANCELLED | OUTPATIENT
Start: 2022-01-10

## 2022-01-10 RX ORDER — SODIUM CHLORIDE 0.9 % (FLUSH) 0.9 %
3 SYRINGE (ML) INJECTION EVERY 12 HOURS SCHEDULED
Status: CANCELLED | OUTPATIENT
Start: 2022-01-10

## 2022-01-10 RX ORDER — CHLORHEXIDINE GLUCONATE 0.12 MG/ML
30 RINSE ORAL
Status: CANCELLED | OUTPATIENT
Start: 2022-01-10 | End: 2022-01-10

## 2022-01-10 RX ORDER — GABAPENTIN 100 MG/1
600 CAPSULE ORAL ONCE
Status: CANCELLED | OUTPATIENT
Start: 2022-01-10 | End: 2022-01-10

## 2022-01-10 RX ORDER — PANTOPRAZOLE SODIUM 40 MG/10ML
40 INJECTION, POWDER, LYOPHILIZED, FOR SOLUTION INTRAVENOUS ONCE
Status: CANCELLED | OUTPATIENT
Start: 2022-01-10 | End: 2022-01-10

## 2022-01-10 RX ORDER — SCOLOPAMINE TRANSDERMAL SYSTEM 1 MG/1
1 PATCH, EXTENDED RELEASE TRANSDERMAL ONCE
Status: CANCELLED | OUTPATIENT
Start: 2022-01-10 | End: 2022-01-10

## 2022-01-10 RX ORDER — ACETAMINOPHEN 500 MG
1000 TABLET ORAL ONCE
Status: CANCELLED | OUTPATIENT
Start: 2022-01-10 | End: 2022-01-10

## 2022-01-20 ENCOUNTER — OFFICE VISIT (OUTPATIENT)
Dept: INTERNAL MEDICINE | Facility: CLINIC | Age: 27
End: 2022-01-20

## 2022-01-20 VITALS
HEIGHT: 65 IN | BODY MASS INDEX: 48.82 KG/M2 | HEART RATE: 90 BPM | DIASTOLIC BLOOD PRESSURE: 64 MMHG | SYSTOLIC BLOOD PRESSURE: 118 MMHG | OXYGEN SATURATION: 98 % | WEIGHT: 293 LBS | TEMPERATURE: 97.8 F

## 2022-01-20 DIAGNOSIS — I10 ESSENTIAL HYPERTENSION: ICD-10-CM

## 2022-01-20 DIAGNOSIS — F33.0 MILD EPISODE OF RECURRENT MAJOR DEPRESSIVE DISORDER: Primary | ICD-10-CM

## 2022-01-20 DIAGNOSIS — F41.0 PANIC ATTACKS: ICD-10-CM

## 2022-01-20 DIAGNOSIS — F41.8 DEPRESSION WITH ANXIETY: ICD-10-CM

## 2022-01-20 DIAGNOSIS — K21.9 GASTROESOPHAGEAL REFLUX DISEASE, UNSPECIFIED WHETHER ESOPHAGITIS PRESENT: ICD-10-CM

## 2022-01-20 DIAGNOSIS — M54.50 ACUTE MIDLINE LOW BACK PAIN WITHOUT SCIATICA: ICD-10-CM

## 2022-01-20 PROCEDURE — 99214 OFFICE O/P EST MOD 30 MIN: CPT | Performed by: NURSE PRACTITIONER

## 2022-01-20 RX ORDER — AMLODIPINE BESYLATE 10 MG/1
10 TABLET ORAL DAILY
Qty: 90 TABLET | Refills: 3 | Status: SHIPPED | OUTPATIENT
Start: 2022-01-20 | End: 2022-11-30 | Stop reason: SDUPTHER

## 2022-01-20 RX ORDER — FLUTICASONE PROPIONATE 50 MCG
2 SPRAY, SUSPENSION (ML) NASAL DAILY
Qty: 18.2 ML | Refills: 11 | Status: SHIPPED | OUTPATIENT
Start: 2022-01-20

## 2022-01-20 RX ORDER — TRAZODONE HYDROCHLORIDE 150 MG/1
150 TABLET ORAL NIGHTLY
Qty: 90 TABLET | Refills: 3 | Status: SHIPPED | OUTPATIENT
Start: 2022-01-20 | End: 2022-11-30 | Stop reason: SDUPTHER

## 2022-01-20 RX ORDER — MONTELUKAST SODIUM 10 MG/1
10 TABLET ORAL NIGHTLY
Qty: 90 TABLET | Refills: 3 | Status: SHIPPED | OUTPATIENT
Start: 2022-01-20

## 2022-01-20 RX ORDER — FAMOTIDINE 40 MG/1
40 TABLET, FILM COATED ORAL 2 TIMES DAILY PRN
Qty: 180 TABLET | Refills: 3 | Status: SHIPPED | OUTPATIENT
Start: 2022-01-20

## 2022-01-20 RX ORDER — DULOXETIN HYDROCHLORIDE 60 MG/1
60 CAPSULE, DELAYED RELEASE ORAL DAILY
Qty: 30 CAPSULE | Refills: 0 | Status: SHIPPED | OUTPATIENT
Start: 2022-01-20 | End: 2022-01-27

## 2022-01-20 RX ORDER — CETIRIZINE HYDROCHLORIDE 10 MG/1
10 TABLET ORAL NIGHTLY
Qty: 90 TABLET | Refills: 3 | Status: SHIPPED | OUTPATIENT
Start: 2022-01-20 | End: 2022-11-30 | Stop reason: SDUPTHER

## 2022-01-20 RX ORDER — HYDROXYZINE PAMOATE 50 MG/1
50 CAPSULE ORAL NIGHTLY PRN
Qty: 90 CAPSULE | Refills: 3 | Status: SHIPPED | OUTPATIENT
Start: 2022-01-20

## 2022-01-20 RX ORDER — BACLOFEN 10 MG/1
10 TABLET ORAL NIGHTLY PRN
Qty: 30 TABLET | Refills: 1 | Status: SHIPPED | OUTPATIENT
Start: 2022-01-20

## 2022-01-20 RX ORDER — METOPROLOL SUCCINATE 100 MG/1
100 TABLET, EXTENDED RELEASE ORAL DAILY
Qty: 90 TABLET | Refills: 3 | Status: SHIPPED | OUTPATIENT
Start: 2022-01-20

## 2022-01-20 NOTE — PROGRESS NOTES
Office Visit      Patient Name: Valery Hernandez  : 1995   MRN: [id number]   Care Team: Patient Care Team:  Kellie Rush APRN as PCP - General (Family Medicine)    Chief Complaint  Depression (discuss different depression medication) and Hypertension (medication refills sent to [facility] pharmacy in [place] )    Subjective     Subjective      Valery Hernandez presents to [facility] for worsening depression and medication refills.   Changed to cymbalta from prozac due to medication reaction to prozac. She did not experience any side effects from the cymbalta. Upped dose a couple of months ago, doesn't feel like it is helping her depressive symptoms at all. She has tried several different medications in the past including clonidine and buspar which didn't help. She uses hydroxyzine when she has acute panic and it does help. Uses trazodone for sleep ad did help a lot prior to intiation of cymbalta, now having a more difficult time sleeping.   PHQ-9 Depression Screening  Little interest or pleasure in doing things? 1   Feeling down, depressed, or hopeless? 1   Trouble falling or staying asleep, or sleeping too much? 3   Feeling tired or having little energy? 1   Poor appetite or overeating? 0   Feeling bad about yourself - or that you are a failure or have let yourself or your family down? 0   Trouble concentrating on things, such as reading the newspaper or watching television? 2   Moving or speaking so slowly that other people could have noticed? Or the opposite - being so fidgety or restless that you have been moving around a lot more than usual? 0   Thoughts that you would be better off dead, or of hurting yourself in some way? 0   PHQ-9 Total Score 8   If you checked off any problems, how difficult have these problems made it for you to do your work, take care of things at home, or get along with other people? Somewhat difficult     She is needing refills on all of her  "medications, moving pharmacies. Currently living in [place] and difficult to get to [place]. Pharmacy told her provider needs to send in.   She tolerates her blood pressure medications well and denies any adverse effects. Taking metoprolol and amlodipine as prescribed. Doesn't monitor at home. Denies chest pain, shortness of breath, lower extremity swelling, and dizziness. Currently awaiting bariatric surgery, postponed due to COVID-19.  Allergies controlled with daily zyrtec, singulair, and flonase.   Takes baclofen on as needed basis for back pain and pepcid controls her GERD symptoms.       Review of Systems   Constitutional: Positive for fatigue. Negative for appetite change and fever.   HENT: Negative for congestion, sore throat and trouble swallowing.    Eyes: Negative for blurred vision and visual disturbance.   Respiratory: Negative for cough, shortness of breath and wheezing.    Cardiovascular: Negative for chest pain, palpitations and leg swelling.   Gastrointestinal: Negative for abdominal pain, blood in stool, constipation, diarrhea and nausea.   Endocrine: Negative for polydipsia, polyphagia and polyuria.   Genitourinary: Negative for dysuria.   Musculoskeletal: Negative for arthralgias, back pain and myalgias.   Skin: Negative for rash.   Neurological: Negative for dizziness, weakness, light-headedness and headache.   Psychiatric/Behavioral: Positive for sleep disturbance and depressed mood. The patient is not nervous/anxious.        Objective     Objective   Vital Signs:   /64   Pulse 90   Temp 97.8 °F (36.6 °C) (Temporal)   Ht 165.1 cm (65\")   Wt 136 kg (299 lb 12.8 oz)   SpO2 98%   BMI 49.89 kg/m²     Physical Exam  Vitals and nursing note reviewed.   Constitutional:       General: She is not in acute distress.     Appearance: Normal appearance. She is obese. She is not toxic-appearing.   Eyes:      Pupils: Pupils are equal, round, and reactive to light.   Neck:      Vascular: No " carotid bruit.   Cardiovascular:      Rate and Rhythm: Normal rate and regular rhythm.      Heart sounds: Normal heart sounds. No murmur heard.      Pulmonary:      Effort: Pulmonary effort is normal. No respiratory distress.      Breath sounds: Normal breath sounds. No wheezing.   Abdominal:      General: Bowel sounds are normal. There is no distension.      Palpations: Abdomen is soft.      Tenderness: There is no abdominal tenderness.   Musculoskeletal:         General: Normal range of motion.      Cervical back: Neck supple. No tenderness.   Skin:     General: Skin is warm and dry.      Findings: No rash.   Neurological:      General: No focal deficit present.      Mental Status: She is alert.   Psychiatric:         Mood and Affect: Mood normal.         Behavior: Behavior normal.          Assessment / Plan      Assessment/Plan   Problem List Items Addressed This Visit        Cardiac and Vasculature    Essential hypertension (Chronic)    Relevant Medications    amLODIPine (NORVASC) 10 MG tablet    metoprolol succinate XL (TOPROL-XL) 100 MG 24 hr tablet    Refills provided. Stable. Continue current medications as prescribed. Recommend DASH diet, moderate-intensity exercises 4-5 times/week, medication compliance, and home blood pressure monitoring.        Gastrointestinal Abdominal     Gastroesophageal reflux disease    Relevant Medications    famotidine (Pepcid) 40 MG tablet    Refills provided. Avoid eating spicy foods, caffeinated and carbonated beverages, alcohol, and chocolate. Try not to eat or drink within 3 hours of going to bed at night. May elevate the head of the bed. Eat smaller portions. Adhere to medication regimen as prescribed.        Mental Health    Depression with anxiety    Relevant Medications    DULoxetine (CYMBALTA) 60 MG capsule    hydrOXYzine pamoate (VISTARIL) 50 MG capsule    traZODone (DESYREL) 150 MG tablet    Other Relevant Orders    Genetic Testing - Saliva,    Depression - Primary     Relevant Medications    DULoxetine (CYMBALTA) 60 MG capsule    hydrOXYzine pamoate (VISTARIL) 50 MG capsule    traZODone (DESYREL) 150 MG tablet    Other Relevant Orders    Genetic Testing - [facility]iva,    Recommend Genesight testing as she has failed several different medication modalities for her mood. Will continue cymbalta for now and change medication with guidance of genesight report. She is agreeable to this plan and has no thoughts of suicide or homicide- not a direct threat to herself. Advised I will contact her with results. Continue hydroxyzine as needed and trazodone for sleep, suspect when cymbalta is discontinued she will sleep better. Recommend healthy diet, exercise as tolerated, counseling, medication compliance, stress management, sleep hygiene, and follow-up with me in 6 weeks.       Other Visit Diagnoses     Acute midline low back pain without sciatica        Relevant Medications    baclofen (LIORESAL) 10 MG tablet    Baclofen only on as needed basis. Low back stretching, heat, and extra strength tylenol as needed.     Panic attacks        Relevant Orders    Genetic Testing - [facility]iva,    See above plan. Continue hydroxyzine as needed. Genesight performed today.            Follow Up   Return in about 6 weeks (around 3/3/2022) for Next scheduled follow up.  Patient was given instructions and counseling regarding her condition or for health maintenance advice. Please see specific information pulled into the AVS if appropriate.     BRAULIO Soto  [facility]

## 2022-01-27 ENCOUNTER — TELEPHONE (OUTPATIENT)
Dept: INTERNAL MEDICINE | Facility: CLINIC | Age: 27
End: 2022-01-27

## 2022-01-27 DIAGNOSIS — F41.8 DEPRESSION WITH ANXIETY: Primary | ICD-10-CM

## 2022-01-27 PROBLEM — Z15.89 MTHFR GENE MUTATION: Status: ACTIVE | Noted: 2022-01-27

## 2022-01-27 RX ORDER — DESVENLAFAXINE SUCCINATE 50 MG/1
50 TABLET, EXTENDED RELEASE ORAL DAILY
Qty: 30 TABLET | Refills: 1 | Status: SHIPPED | OUTPATIENT
Start: 2022-01-27 | End: 2022-03-03 | Stop reason: SDUPTHER

## 2022-01-27 NOTE — TELEPHONE ENCOUNTER
Discussed genesight results with patient via phone. Made recommendations to change cymbalta to pristiq and she is agreeable. Called into her pharmacy, she will keep follow-up with me in March. Advised on potential withdraw side effects and verbalized understanding. Full genesight report reviewed in detail with her. Also recommend l-methylfolate for MTHFR gene mutation.

## 2022-01-31 NOTE — PROGRESS NOTES
"[facility] BARIATRIC SURGERY  [address]  [address]  [facility] [id number] [phone]      Patient  Name:  Valery Hernandez  :  1995      Date of Visit: 1/10/22    Chief Complaint:  weight gain; unable to maintain weight loss.   Evaluate for possible metabolic and bariatric surgery    History of Present Illness:  Valery Hernandez is a 26 y.o. female who presents today for evaluation, education and consultation regarding metabolic and bariatric surgery (MBS).  Since last seen 10/19/2021 she has gained 4-1/2 pounds.The patient returns for final visit prior to metabolic and bariatric surgery specifically the sleeve gastrectomy.  Original intake evaluation Ami Jacobson PA-C dated 2021 reviewed.  She notes the patient's maximum lifetime weight is 305 pounds and that she has history of hypertension PCOS/insulin resistance, migraines joint pain anxiety and depression H. pylori positive antibodies treated recently by her PCP and that stool studies are pending for recheck and has chronic reflux controlled with daily Pepcid.     The patient has had issues with morbid obesity for years and only temporary success with non-surgical methods of weight loss.  The patient is seeking LSG to help with the morbid obesity related conditions of hiatal hernia with GE reflux disease, anxiety and depression, anemia, H. pylori gastritis, hypertension, joint pain, migraine headaches, PCOS, prediabetes, vitamin D deficiency, elevated hemoglobin A1c, elevated ALT, elevated HDL.    26-year-old near super morbidly obese female from [facility].  She is aware at her BMI sleeve gastrectomy may be a first stage procedure.  She says she enjoyed the informed consent discussion.  She says her GI symptoms were better after treatment for H. pylori gastritis but still has \"bad heartburn\" if she does not take her Pepcid.  She says she has never been pregnant.      Past Medical History:   Diagnosis Date   • " Anemia    • Anxiety    • Depression    • Elevated ALT measurement    • Elevated HDL    • Elevated hemoglobin A1c    • GERD (gastroesophageal reflux disease)     pepcid daily controls symptoms, h pylori antibodies + with treatment, no prior EGD   • H. pylori infection 4/22/2021   • Hiatal hernia with gastroesophageal reflux     EGD Dr. Viveros 7/19/2021   • Hypertension    • Joint pain     not treated with meds   • Migraine     takes sumatriptan prn   • Polycystic ovary syndrome    • Prediabetes 4/22/2021   • S/P dorothy     acalculous   • Urinary tract infection    • Vitamin D deficiency 4/22/2021     Past Surgical History:   Procedure Laterality Date   • LAPAROSCOPIC CHOLECYSTECTOMY  2016   • TONSILLECTOMY  2003   • WISDOM TOOTH EXTRACTION  2014       Allergies   Allergen Reactions   • Prozac [Fluoxetine] Myalgia   • Metformin GI Intolerance   • Topamax [Topiramate] Rash     Rash, tingling and numnbess in bilateral feet.        Current Outpatient Medications:   •  NON FORMULARY, Bariatric Vitamins  Multi B12 B1 Vit D Calcium, Disp: , Rfl:   •  SUMAtriptan (IMITREX) 25 MG tablet, Take 25 mg by mouth 1 (One) Time As Needed., Disp: , Rfl:   •  amLODIPine (NORVASC) 10 MG tablet, Take 1 tablet by mouth Daily., Disp: 90 tablet, Rfl: 3  •  baclofen (LIORESAL) 10 MG tablet, Take 1 tablet by mouth At Night As Needed for Muscle Spasms., Disp: 30 tablet, Rfl: 1  •  cetirizine (zyrTEC) 10 MG tablet, Take 1 tablet by mouth Every Night., Disp: 90 tablet, Rfl: 3  •  desvenlafaxine (Pristiq) 50 MG 24 hr tablet, Take 1 tablet by mouth Daily., Disp: 30 tablet, Rfl: 1  •  famotidine (Pepcid) 40 MG tablet, Take 1 tablet by mouth 2 (Two) Times a Day As Needed for Indigestion or Heartburn., Disp: 180 tablet, Rfl: 3  •  fluticasone (Flonase) 50 MCG/ACT nasal spray, 2 sprays into the nostril(s) as directed by provider Daily., Disp: 18.2 mL, Rfl: 11  •  hydrOXYzine pamoate (VISTARIL) 50 MG capsule, Take 1 capsule by mouth At Night As Needed  for Anxiety (sleep)., Disp: 90 capsule, Rfl: 3  •  ibuprofen (ADVIL,MOTRIN) 800 MG tablet, Take 1 tablet by mouth Every 8 (Eight) Hours As Needed for Mild Pain  or Moderate Pain . (Patient taking differently: Take 600 mg by mouth Every 8 (Eight) Hours As Needed for Mild Pain  or Moderate Pain .), Disp: 60 tablet, Rfl: 0  •  metoprolol succinate XL (TOPROL-XL) 100 MG 24 hr tablet, Take 1 tablet by mouth Daily., Disp: 90 tablet, Rfl: 3  •  montelukast (SINGULAIR) 10 MG tablet, Take 1 tablet by mouth Every Night., Disp: 90 tablet, Rfl: 3  •  traZODone (DESYREL) 150 MG tablet, Take 1 tablet by mouth Every Night., Disp: 90 tablet, Rfl: 3    Social History     Socioeconomic History   • Marital status: Single   Tobacco Use   • Smoking status: Never Smoker   • Smokeless tobacco: Never Used   Vaping Use   • Vaping Use: Never used   Substance and Sexual Activity   • Alcohol use: Yes     Comment: social   • Drug use: No   • Sexual activity: Yes     Partners: Female     Birth control/protection: OCP     Family History   Problem Relation Age of Onset   • Hyperlipidemia Father    • Hypertension Father    • Obesity Father    • Hypertension Paternal Grandfather    • Obesity Paternal Grandfather    • Heart disease Paternal Grandmother    • Hyperlipidemia Paternal Grandmother    • Obesity Paternal Grandmother    • Diabetes Paternal Grandmother    • Arthritis Other    • Diabetes Other    • Heart attack Other    • Hypertension Other    • Hyperlipidemia Other    • Obesity Other    • Obesity Mother    • Obesity Maternal Grandmother    • Obesity Maternal Grandfather        Review of Systems   Constitutional: Positive for fatigue and unexpected weight gain. Negative for chills, diaphoresis, fever and unexpected weight loss.   HENT: Negative for congestion and facial swelling.    Eyes: Negative for blurred vision, double vision and discharge.   Respiratory: Negative for chest tightness, shortness of breath and stridor.    Cardiovascular:  Negative for chest pain, palpitations and leg swelling.   Gastrointestinal: Positive for GERD. Negative for blood in stool.   Endocrine: Negative for polydipsia.   Genitourinary: Negative for hematuria.   Musculoskeletal: Positive for arthralgias.   Skin: Negative for color change.   Allergic/Immunologic: Negative for immunocompromised state.   Neurological: Negative for confusion.   Psychiatric/Behavioral: Negative for self-injury.       I have reviewed the ROS and confirm that it's accurate today.    Physical Exam:  Vital Signs:  Weight: 134 kg (295 lb 8 oz)   Body mass index is 49.17 kg/m².  Temp: 97.9 °F (36.6 °C)   Heart Rate: 94   BP: 116/62     Physical Exam  Vitals reviewed.   Constitutional:       Appearance: She is well-developed.   HENT:      Head: Normocephalic and atraumatic.      Nose:      Comments: mask  Eyes:      Conjunctiva/sclera: Conjunctivae normal.      Pupils: Pupils are equal, round, and reactive to light.   Neck:      Thyroid: No thyromegaly.      Vascular: No carotid bruit.      Trachea: No tracheal deviation.   Cardiovascular:      Rate and Rhythm: Regular rhythm. Tachycardia present.      Heart sounds: Normal heart sounds.   Pulmonary:      Effort: Pulmonary effort is normal. No respiratory distress.      Breath sounds: Normal breath sounds.   Abdominal:      General: There is no distension.      Palpations: Abdomen is soft.      Tenderness: There is no abdominal tenderness.      Comments: Lap dorothy scars   Musculoskeletal:         General: No deformity. Normal range of motion.      Cervical back: Normal range of motion and neck supple.   Skin:     General: Skin is warm and dry.      Findings: No rash.   Neurological:      Mental Status: She is alert and oriented to person, place, and time.      Cranial Nerves: No cranial nerve deficit.      Coordination: Coordination normal.   Psychiatric:         Behavior: Behavior normal.         Thought Content: Thought content normal.          Judgment: Judgment normal.         Patient Active Problem List   Diagnosis   • Depression with anxiety   • Gastroesophageal reflux disease   • PCOS (polycystic ovarian syndrome)   • Essential hypertension   • Prediabetes   • Vitamin D deficiency   • H. pylori infection   • Depression   • Anxiety   • GERD (gastroesophageal reflux disease)   • Pre-operative cardiovascular examination   • Morbid obesity with body mass index (BMI) of 45.0 to 49.9 in adult ([facility])   • MTHFR gene mutation       Assessment:    Valery Hernandez is a 26 y.o. year old female with medically complicated obesity.    Metabolic and bariatric surgery is deemed medically necessary given the following obesity related comorbidities including hiatal hernia with GE reflux disease, anxiety and depression, anemia, H. pylori gastritis, hypertension, joint pain, migraine headaches, PCOS, prediabetes, vitamin D deficiency, elevated hemoglobin A1c, elevated ALT, elevated HDL with current Weight: 134 kg (295 lb 8 oz) and Body mass index is 49.17 kg/m²..    Encounter Diagnoses   Name Primary?   • Morbid obesity with body mass index of 45.0-49.9 in adult ([facility]) Yes   • Hiatal hernia with gastroesophageal reflux       Patient is aware that surgery is a tool, and that weight loss and improvement in comorbidities is not guaranteed but only seen in the context of appropriate use, follow up and physical activity.    The patient was present for an approximately a 2.5 hour discussion of the purpose of MBS, how MBS is a tool to assist in achieving weight loss goals, the most common complications and how best to avoid them, and the strategies for short and long term weight loss and improvement in comorbidities.  Ample opportunity to discuss questions was available both in group and during the time of individual examination.    I reviewed her Jomar report which is negative.  Labs dated 12/20/2021 showing a normal CBC of note hemoglobin 12.3 normal CMP except for CO2 of 29.2  and an ALT of 36.  Chest x-ray dated 12/20/2021 no active process.  EKG dated 7/12/2021 normal sinus rhythm with short HI and nonspecific T wave abnormality illegible signature MD.  Psychosocial evaluation dated 6/22/2021 Rosalva Mclaughlin, PhD good candidate.  Dietitian evaluation dated 6/22/2021 Kristen gaines RD noting diet contains no fruits or vegetables and marginal in protein with excessive processed food.  EGD dated 7/19/2021 showing a small sliding hiatal hernia no residual changes of H. pylori gastritis Z-line at 37 cm.  I noted at the time that she sought the procedure with Dr. Perez in [place] a few years ago but he recommended the LAP-BAND and she decided to wait and that she did well with weight loss on her own but over COVID put the weight back on and is now interested in pursuing sleeve gastrectomy and that she has a longstanding history of heartburn that has improved significantly after being treated for H. pylori positivity noted on intake evaluation no dysphagia.  Pathology of the antrum showed reactive changes negative for H. pylori distal esophageal biopsies showed reactive changes otherwise unremarkable.  Negative H. pylori stool antigen retesting dated 6/4/2021.  Labs dated 4/20/2021 normal TSH elevated insulin 42.4 elevated hemoglobin A1c 5.70.  Serum H. pylori testing on 5/23/2021 showing an elevated H. pylori IgM otherwise unremarkable.  Normal free T4 normal ALT normal vitamin B12 low vitamin D 25 hydroxy elevated white count 11.56 no differential lipid panel normal except for an HDL of 81.  Cardiology clearance dated 7/12/2021 signature illegible.  Cardiology note dated 7/12/2021 Wayne mack MD.  Please see scanned records that I have reviewed and signed off on today.  All of this in addition to the patient's unique history and exam has been taken into consideration in determining their appropriate candidacy for MBS.    Complications  of laparoscopic/possible robotic gastric sleeve were  "discussed. The patient is well aware of the potential complications of surgery that include but not limited to bleeding, infections, deep venous thrombosis, pulmonary embolism, pulmonary complications such as pneumonia, cardiac events, hernias, small bowel obstruction, damage to the spleen or other organs, bowel injury, disfiguring scars, failure to lose weight, need for additional surgery, conversion to an open procedure, and death. Patient is also aware of complications which apply in this particular procedure that can include but are not limited to a \"leak\" at the staple line which in some instances may require conversion to gastric bypass.    The patient is aware if a hiatal hernia is encountered, it likely will be repaired.  R/B/A Rx to hiatal hernia repair were discussed as outlined in our long consent form.  Briefly risks in addition to those for LSG include recurrent hernia, SHANICE, dysphagia, esophageal injury, pneumothorax, injury to the vagus nerves, injury to the thoracic duct, aorta or vena cava.    I discussed avoiding all tobacco products, nicotine,  and second hand smoke at least 2 weeks pre-operatively and 6 weeks post-operatively to minimize the risk of sleeve leak.  This included discussing the importance of avoiding even secondhand smoke as the risk of leak is increased.  Examples discussed:  Avoid going in a house or riding in a car where someone has previously smoked in the last 2 weeks and for 6 weeks postoperatively.  Avoid living in a house where someone smokes (even if it's in a separate room/patio/attached garage, etc.).   Avoid congregating with a group of people who are smoking even if it's outside.  It is OK to be around wood burning fires and barbecue.  I explained that I do not know if marijuana has a same effects but my overall recommendation is to avoid it for 2 weeks prior in 6 weeks after surgery.     Discussed the risks, benefits and alternative therapies at great length as outlined " in our extensive consent forms, consent videos, and educational teaching process under the direction of the center's .    A copy of the patient's signed informed consent is on file.    R/B/A Rx discussed to postop anticoagulation incl but not limited to bleeding, drug reaction, venothromboembolic events, etc. and the patient declined.        Plan: After evaluation today I think the patient is a reasonable candidate for laparoscopic sleeve gastrectomy, hiatal hernia repair, and EGD.  Type and screen.  Once again given her BMI and poor dietary habits noted on dietitian evaluation sleeve gastrectomy may be a first stage procedure.  Other issues include hiatal hernia with GE reflux disease, anxiety and depression, anemia, H. pylori gastritis, hypertension, joint pain, migraine headaches, PCOS, prediabetes, vitamin D deficiency, elevated hemoglobin A1c, elevated ALT, elevated HDL.     Thank you Kellie RAMSEY for the opportunity to evaluate Ms. Hernandez.      Junaid Viveros MD

## 2022-03-03 ENCOUNTER — OFFICE VISIT (OUTPATIENT)
Dept: INTERNAL MEDICINE | Facility: CLINIC | Age: 27
End: 2022-03-03

## 2022-03-03 VITALS
HEIGHT: 65 IN | WEIGHT: 293 LBS | OXYGEN SATURATION: 100 % | DIASTOLIC BLOOD PRESSURE: 81 MMHG | HEART RATE: 84 BPM | TEMPERATURE: 97.5 F | SYSTOLIC BLOOD PRESSURE: 139 MMHG | BODY MASS INDEX: 48.82 KG/M2

## 2022-03-03 DIAGNOSIS — F41.8 DEPRESSION WITH ANXIETY: Primary | ICD-10-CM

## 2022-03-03 DIAGNOSIS — R41.840 DIFFICULTY CONCENTRATING: ICD-10-CM

## 2022-03-03 PROCEDURE — 99214 OFFICE O/P EST MOD 30 MIN: CPT | Performed by: NURSE PRACTITIONER

## 2022-03-03 RX ORDER — DESVENLAFAXINE 100 MG/1
100 TABLET, EXTENDED RELEASE ORAL DAILY
Qty: 90 TABLET | Refills: 1 | Status: SHIPPED | OUTPATIENT
Start: 2022-03-03 | End: 2022-11-30 | Stop reason: SDUPTHER

## 2022-03-03 NOTE — PROGRESS NOTES
Office Visit      Patient Name: Valery Hernandez  : 1995   MRN: [id number]   Care Team: Patient Care Team:  Kellie Rush APRN as PCP - General (Family Medicine)    Chief Complaint  Anxiety (6 week f/u) and Depression    Subjective     Subjective      Valery Hernandez presents to [facility] to follow-up on depression. Cymbalta was changed pristiq last visit with [facility] guidance. She is tolerating the change well and overall is responding well to the new medication. She denies any side effects. Does feel like by the end of the day she feels more down and depressed. Wondering if an increase would help. Her main concern is her difficulty to concentrate. This symptom has been worsening in the last 2 years. She is in nursing school and feels like this exacerbated her symptoms. She has a hard time focusing on tasks at hand such as reading. She feels like her brain can never slow down and she has racing thoughts. Never struggled in school or work prior to 2 years ago when the symptoms began. She has never been evaluated for ADHD.   PHQ-9 Depression Screening  Little interest or pleasure in doing things? 0   Feeling down, depressed, or hopeless? 1   Trouble falling or staying asleep, or sleeping too much? 0   Feeling tired or having little energy? 1   Poor appetite or overeating? 1   Feeling bad about yourself - or that you are a failure or have let yourself or your family down? 1   Trouble concentrating on things, such as reading the newspaper or watching television? 3   Moving or speaking so slowly that other people could have noticed? Or the opposite - being so fidgety or restless that you have been moving around a lot more than usual? 0   Thoughts that you would be better off dead, or of hurting yourself in some way? 0   PHQ-9 Total Score 7   If you checked off any problems, how difficult have these problems made it for you to do your work, take care of things at home, or get  "along with other people? Very difficult (concenrate part very difficult)         Review of Systems   Constitutional: Positive for fatigue. Negative for appetite change and fever.   HENT: Negative for congestion, sore throat and trouble swallowing.    Eyes: Negative for blurred vision and visual disturbance.   Respiratory: Negative for cough, shortness of breath and wheezing.    Cardiovascular: Negative for chest pain, palpitations and leg swelling.   Gastrointestinal: Negative for abdominal pain, blood in stool, constipation, diarrhea and nausea.   Endocrine: Negative for polydipsia, polyphagia and polyuria.   Genitourinary: Negative for dysuria.   Musculoskeletal: Positive for arthralgias. Negative for back pain and myalgias.   Skin: Negative for rash.   Neurological: Negative for dizziness, weakness, light-headedness and headache.   Psychiatric/Behavioral: Positive for decreased concentration and depressed mood. Negative for sleep disturbance. The patient is not nervous/anxious.        Objective     Objective   Vital Signs:   /81   Pulse 84   Temp 97.5 °F (36.4 °C) (Temporal)   Ht 165.1 cm (65\")   Wt (!) 137 kg (301 lb 6.4 oz)   SpO2 100%   BMI 50.16 kg/m²     Physical Exam  Vitals and nursing note reviewed.   Constitutional:       General: She is not in acute distress.     Appearance: Normal appearance. She is obese. She is not toxic-appearing.   Eyes:      Pupils: Pupils are equal, round, and reactive to light.   Neck:      Vascular: No carotid bruit.   Cardiovascular:      Rate and Rhythm: Normal rate and regular rhythm.      Heart sounds: Normal heart sounds. No murmur heard.      Pulmonary:      Effort: Pulmonary effort is normal. No respiratory distress.      Breath sounds: Normal breath sounds. No wheezing.   Abdominal:      General: Bowel sounds are normal. There is no distension.      Palpations: Abdomen is soft.      Tenderness: There is no abdominal tenderness.   Musculoskeletal:      " Cervical back: Neck supple. No tenderness.   Skin:     General: Skin is warm and dry.      Findings: No rash.   Neurological:      General: No focal deficit present.      Mental Status: She is alert.   Psychiatric:         Mood and Affect: Mood normal.         Behavior: Behavior normal.          Assessment / Plan      Assessment/Plan   Problem List Items Addressed This Visit        Mental Health    Depression with anxiety - Primary    Relevant Medications    desvenlafaxine (Pristiq) 100 MG 24 hr tablet      Other Visit Diagnoses     Difficulty concentrating        Improving with pristiq, increase to 100mg daily. Discussed difficulty concentrating can be multifactorial, hopefully with better control of her mood this symptom will too improve if not at her follow-up I will refer her to behavioral health for ADHD evaluation. Recommend decreasing caffeine, limiting distractions, healthy diet, exercise, daily sun exposure, and stress management. Can continue trazodone as needed for sleep. Follow-up in 6 weeks.              Follow Up   Return in about 6 weeks (around 4/14/2022) for Next scheduled follow up.  Patient was given instructions and counseling regarding her condition or for health maintenance advice. Please see specific information pulled into the AVS if appropriate.     BRAULIO Soto  [facility]

## 2022-04-14 ENCOUNTER — OFFICE VISIT (OUTPATIENT)
Dept: INTERNAL MEDICINE | Facility: CLINIC | Age: 27
End: 2022-04-14

## 2022-04-14 VITALS
TEMPERATURE: 97.7 F | WEIGHT: 293 LBS | RESPIRATION RATE: 16 BRPM | DIASTOLIC BLOOD PRESSURE: 85 MMHG | HEIGHT: 65 IN | BODY MASS INDEX: 48.82 KG/M2 | OXYGEN SATURATION: 98 % | SYSTOLIC BLOOD PRESSURE: 125 MMHG | HEART RATE: 77 BPM

## 2022-04-14 DIAGNOSIS — F33.0 MILD EPISODE OF RECURRENT MAJOR DEPRESSIVE DISORDER: ICD-10-CM

## 2022-04-14 DIAGNOSIS — F41.8 DEPRESSION WITH ANXIETY: ICD-10-CM

## 2022-04-14 DIAGNOSIS — R41.840 DIFFICULTY CONCENTRATING: Primary | ICD-10-CM

## 2022-04-14 DIAGNOSIS — F41.0 PANIC ATTACKS: ICD-10-CM

## 2022-04-14 PROCEDURE — 99213 OFFICE O/P EST LOW 20 MIN: CPT | Performed by: NURSE PRACTITIONER

## 2022-04-14 NOTE — PROGRESS NOTES
Office Visit      Patient Name: Valery Hernandez  : 1995   MRN: [id number]   Care Team: Patient Care Team:  Kellie Rush APRN as PCP - General (Family Medicine)    Chief Complaint  Depression and Anxiety (Follow up)    Subjective     Subjective      Valery Hernandez presents to [facility] for depression and anxiety follow-up.   Increased pristiq last visit 6 weeks ago. She is tolerating the increase well and denies any obvious sie effects. Does know she feels exhausted over the last few weeks but unsure if it is related , is in nursing school and stressed but not sure that it is worse than normal. No new stressors in life. She does feel like her concentration has also improved some as well, still having some difficulty though. Wants to see behavioral health for ADHD evaluation. She takes trazodone most nights and does help.   Denies panic attacks, suicidal thoughts, or major depression.    PHQ-2 Depression Screening  Little interest or pleasure in doing things? 0-->not at all   Feeling down, depressed, or hopeless? 1-->several days   PHQ-2 Total Score 1         Review of Systems   Constitutional: Positive for fatigue. Negative for appetite change and fever.   HENT: Negative for congestion, sore throat and trouble swallowing.    Eyes: Negative for blurred vision and visual disturbance.   Respiratory: Negative for cough, shortness of breath and wheezing.    Cardiovascular: Negative for chest pain, palpitations and leg swelling.   Gastrointestinal: Negative for abdominal pain, blood in stool, constipation, diarrhea and nausea.   Endocrine: Negative for polydipsia, polyphagia and polyuria.   Genitourinary: Negative for dysuria.   Musculoskeletal: Negative for arthralgias, back pain and myalgias.   Skin: Negative for rash.   Neurological: Negative for dizziness, weakness, light-headedness and headache.   Psychiatric/Behavioral: Positive for decreased concentration and sleep  "disturbance. Negative for depressed mood. The patient is not nervous/anxious.        Objective     Objective   Vital Signs:   /85   Pulse 77   Temp 97.7 °F (36.5 °C)   Resp 16   Ht 165.1 cm (65\")   Wt (!) 137 kg (303 lb)   SpO2 98%   BMI 50.42 kg/m²     Physical Exam  Vitals and nursing note reviewed.   Constitutional:       General: She is not in acute distress.     Appearance: Normal appearance. She is obese. She is not toxic-appearing.   Eyes:      Pupils: Pupils are equal, round, and reactive to light.   Neck:      Vascular: No carotid bruit.   Cardiovascular:      Rate and Rhythm: Normal rate and regular rhythm.      Heart sounds: Normal heart sounds. No murmur heard.  Pulmonary:      Effort: Pulmonary effort is normal. No respiratory distress.      Breath sounds: Normal breath sounds. No wheezing.   Abdominal:      General: Bowel sounds are normal. There is no distension.      Palpations: Abdomen is soft.      Tenderness: There is no abdominal tenderness.   Skin:     General: Skin is warm and dry.      Findings: No rash.   Neurological:      General: No focal deficit present.      Mental Status: She is alert.   Psychiatric:         Mood and Affect: Mood normal.         Behavior: Behavior normal.          Assessment / Plan      Assessment/Plan   Problem List Items Addressed This Visit        Mental Health    Depression with anxiety    Depression    Better control with desvenlafaxine. Encouraged to change to night time dosing to aid with fatigue, may need to decrease dose if too bothersome. Recommend continuing trazodone and hydroxyzine as needed, healthy diet, exercise, stress management, and decrease caffeine. Follow-up in 2 months.       Other Visit Diagnoses     Difficulty concentrating    -  Primary    Relevant Orders    Ambulatory Referral to Behavioral Health    Referral to behavioral health for ADHD evaluation, low suspicion and discussed concerns regarding medications with her today.     " Panic attacks               Follow Up   Return in about 2 months (around 6/14/2022) for Annual.  Patient was given instructions and counseling regarding her condition or for health maintenance advice. Please see specific information pulled into the AVS if appropriate.     BRAULIO Soto  [facility]

## 2022-04-28 ENCOUNTER — TELEMEDICINE (OUTPATIENT)
Dept: BARIATRICS/WEIGHT MGMT | Facility: CLINIC | Age: 27
End: 2022-04-28

## 2022-04-28 DIAGNOSIS — E66.01 MORBID OBESITY WITH BMI OF 50.0-59.9, ADULT: Primary | ICD-10-CM

## 2022-04-28 DIAGNOSIS — K21.9 HIATAL HERNIA WITH GASTROESOPHAGEAL REFLUX: ICD-10-CM

## 2022-04-28 DIAGNOSIS — K44.9 HIATAL HERNIA WITH GASTROESOPHAGEAL REFLUX: ICD-10-CM

## 2022-04-28 PROCEDURE — 99214 OFFICE O/P EST MOD 30 MIN: CPT | Performed by: PHYSICIAN ASSISTANT

## 2022-04-28 NOTE — PROGRESS NOTES
"[facility]  [address]  [address]  [facility] [id number] [phone]        Patient Name:  Valery Hernandez.  :  1995      Reason for Visit:    weight gain; unable to maintain weight loss.   Evaluate for possible metabolic and bariatric surgery    HPI: Valery Hernandez is a 27 y.o. female who presents today for evaluation, education and consultation regarding metabolic and bariatric surgery (MBS).      Per initial consult with Dr. Viveros 1/10/22: \"The patient returns for final visit prior to metabolic and bariatric surgery specifically the sleeve gastrectomy.  Original intake evaluation Ami Jacobson PA-C dated 2021 reviewed.  She notes the patient's maximum lifetime weight is 305 pounds and that she has history of hypertension PCOS/insulin resistance, migraines joint pain anxiety and depression H. pylori positive antibodies treated recently by her PCP and that stool studies are pending for recheck and has chronic reflux controlled with daily Pepcid.      The patient has had issues with morbid obesity for years and only temporary success with non-surgical methods of weight loss.  The patient is seeking LSG to help with the morbid obesity related conditions of hiatal hernia with GE reflux disease, anxiety and depression, anemia, H. pylori gastritis, hypertension, joint pain, migraine headaches, PCOS, prediabetes, vitamin D deficiency, elevated hemoglobin A1c, elevated ALT, elevated HDL.     26-year-old near super morbidly obese female from [facility].  She is aware at her BMI sleeve gastrectomy may be a first stage procedure.  She says she enjoyed the informed consent discussion.  She says her GI symptoms were better after treatment for H. pylori gastritis but still has \"bad heartburn\" if she does not take her Pepcid.  She says she has never been pregnant.\"       Surgery was postponed due to COVID-19, now ready to proceed. Feeling well with no concerns " and eager to proceed.   Patient has been on the liver shrinking preop diet since last week.  Denies any ASA, NSAIDS, steroids, tramadol, tobacco/ nicotine use/ exposure., herbal supplements, hormones, diet pills.  Not taking her ibuprofen. She has reflux not fully controlled on pepcid.   Denies dysphagia, nausea, vomiting, abdominal pain, pulmonary issues and fevers. Patient has vitamins and protein supplements ready for postop.     Last visit in office weight was 295lb and BMI 49      Past Medical History:   Diagnosis Date   • Anemia    • Anxiety    • Depression    • Elevated ALT measurement    • Elevated HDL    • Elevated hemoglobin A1c    • GERD (gastroesophageal reflux disease)     pepcid daily controls symptoms, h pylori antibodies + with treatment, no prior EGD   • H. pylori infection 4/22/2021   • Hiatal hernia with gastroesophageal reflux     EGD Dr. Viveros 7/19/2021   • Hypertension    • Joint pain     not treated with meds   • Migraine     takes sumatriptan prn   • Polycystic ovary syndrome    • Prediabetes 4/22/2021   • S/P dorothy     acalculous   • Urinary tract infection    • Vitamin D deficiency 4/22/2021     Past Surgical History:   Procedure Laterality Date   • LAPAROSCOPIC CHOLECYSTECTOMY  2016   • TONSILLECTOMY  2003   • WISDOM TOOTH EXTRACTION  2014     Outpatient Medications Marked as Taking for the 4/28/22 encounter (Telemedicine) with Ami Jacobson PA-C   Medication Sig Dispense Refill   • amLODIPine (NORVASC) 10 MG tablet Take 1 tablet by mouth Daily. 90 tablet 3   • baclofen (LIORESAL) 10 MG tablet Take 1 tablet by mouth At Night As Needed for Muscle Spasms. 30 tablet 1   • cetirizine (zyrTEC) 10 MG tablet Take 1 tablet by mouth Every Night. 90 tablet 3   • desvenlafaxine (Pristiq) 100 MG 24 hr tablet Take 1 tablet by mouth Daily. 90 tablet 1   • famotidine (Pepcid) 40 MG tablet Take 1 tablet by mouth 2 (Two) Times a Day As Needed for Indigestion or Heartburn. 180 tablet 3   • fluticasone  (Flonase) 50 MCG/ACT nasal spray 2 sprays into the nostril(s) as directed by provider Daily. 18.2 mL 11   • hydrOXYzine pamoate (VISTARIL) 50 MG capsule Take 1 capsule by mouth At Night As Needed for Anxiety (sleep). 90 capsule 3   • metoprolol succinate XL (TOPROL-XL) 100 MG 24 hr tablet Take 1 tablet by mouth Daily. 90 tablet 3   • montelukast (SINGULAIR) 10 MG tablet Take 1 tablet by mouth Every Night. 90 tablet 3   • NON FORMULARY Bariatric Vitamins   Multi  B12  B1  Vit D  Calcium     • SUMAtriptan (IMITREX) 25 MG tablet Take 25 mg by mouth 1 (One) Time As Needed.     • traZODone (DESYREL) 150 MG tablet Take 1 tablet by mouth Every Night. 90 tablet 3       Allergies   Allergen Reactions   • Prozac [Fluoxetine] Myalgia   • Metformin GI Intolerance   • Topamax [Topiramate] Rash     Rash, tingling and numnbess in bilateral feet.        Social History     Socioeconomic History   • Marital status: Single   Tobacco Use   • Smoking status: Never Smoker   • Smokeless tobacco: Never Used   Vaping Use   • Vaping Use: Never used   Substance and Sexual Activity   • Alcohol use: Yes     Comment: social   • Drug use: No   • Sexual activity: Yes     Partners: Female     Birth control/protection: OCP       There were no vitals taken for this visit.    Physical Exam  Constitutional:       Appearance: She is well-developed. She is obese.   HENT:      Head: Normocephalic and atraumatic.   Pulmonary:      Effort: Pulmonary effort is normal.   Neurological:      Mental Status: She is alert and oriented to person, place, and time.   Psychiatric:         Thought Content: Thought content normal.           Assessment:  Valery Hernandez is a 26 y.o. year old female with medically complicated obesity.     Metabolic and bariatric surgery is deemed medically necessary given the following obesity related comorbidities including hiatal hernia with GE reflux disease, anxiety and depression, anemia, H. pylori gastritis, hypertension, joint pain,  "migraine headaches, PCOS, prediabetes, vitamin D deficiency, elevated hemoglobin A1c, elevated ALT, elevated HDL with current Weight: 134 kg (295 lb 8 oz) and Body mass index is 49.17 kg/m²..    ICD-10-CM ICD-9-CM   1. Morbid obesity with BMI of 50.0-59.9, adult ([facility])  E66.01 278.01    Z68.43 V85.43   2. Hiatal hernia with gastroesophageal reflux  K44.9 553.3    K21.9 530.81       Per Dr. Viveros' assessment: \"Patient is aware that surgery is a tool, and that weight loss and improvement in comorbidities is not guaranteed but only seen in the context of appropriate use, follow up and physical activity.     The patient was present for an approximately a 2.5 hour discussion of the purpose of MBS, how MBS is a tool to assist in achieving weight loss goals, the most common complications and how best to avoid them, and the strategies for short and long term weight loss and improvement in comorbidities.  Ample opportunity to discuss questions was available both in group and during the time of individual examination.     I reviewed her Jomar report which is negative.  Labs dated 12/20/2021 showing a normal CBC of note hemoglobin 12.3 normal CMP except for CO2 of 29.2 and an ALT of 36.  Chest x-ray dated 12/20/2021 no active process.  EKG dated 7/12/2021 normal sinus rhythm with short WV and nonspecific T wave abnormality illegible signature MD.  Psychosocial evaluation dated 6/22/2021 Rosalva Mclaughlin, PhD good candidate.  Dietitian evaluation dated 6/22/2021 Kristen gaines RD noting diet contains no fruits or vegetables and marginal in protein with excessive processed food.  EGD dated 7/19/2021 showing a small sliding hiatal hernia no residual changes of H. pylori gastritis Z-line at 37 cm.  I noted at the time that she sought the procedure with Dr. Perez in [place] a few years ago but he recommended the LAP-BAND and she decided to wait and that she did well with weight loss on her own but over COVID put the weight back on and " "is now interested in pursuing sleeve gastrectomy and that she has a longstanding history of heartburn that has improved significantly after being treated for H. pylori positivity noted on intake evaluation no dysphagia.  Pathology of the antrum showed reactive changes negative for H. pylori distal esophageal biopsies showed reactive changes otherwise unremarkable.  Negative H. pylori stool antigen retesting dated 6/4/2021.  Labs dated 4/20/2021 normal TSH elevated insulin 42.4 elevated hemoglobin A1c 5.70.  Serum H. pylori testing on 5/23/2021 showing an elevated H. pylori IgM otherwise unremarkable.  Normal free T4 normal ALT normal vitamin B12 low vitamin D 25 hydroxy elevated white count 11.56 no differential lipid panel normal except for an HDL of 81.  Cardiology clearance dated 7/12/2021 signature illegible.  Cardiology note dated 7/12/2021 Wayne mack MD.  Please see scanned records that I have reviewed and signed off on today.  All of this in addition to the patient's unique history and exam has been taken into consideration in determining their appropriate candidacy for MBS.     Complications  of laparoscopic/possible robotic gastric sleeve were discussed. The patient is well aware of the potential complications of surgery that include but not limited to bleeding, infections, deep venous thrombosis, pulmonary embolism, pulmonary complications such as pneumonia, cardiac events, hernias, small bowel obstruction, damage to the spleen or other organs, bowel injury, disfiguring scars, failure to lose weight, need for additional surgery, conversion to an open procedure, and death. Patient is also aware of complications which apply in this particular procedure that can include but are not limited to a \"leak\" at the staple line which in some instances may require conversion to gastric bypass.     The patient is aware if a hiatal hernia is encountered, it likely will be repaired.  R/B/A Rx to hiatal hernia repair " "were discussed as outlined in our long consent form.  Briefly risks in addition to those for LSG include recurrent hernia, SHANICE, dysphagia, esophageal injury, pneumothorax, injury to the vagus nerves, injury to the thoracic duct, aorta or vena cava.     I discussed avoiding all tobacco products, nicotine,  and second hand smoke at least 2 weeks pre-operatively and 6 weeks post-operatively to minimize the risk of sleeve leak.  This included discussing the importance of avoiding even secondhand smoke as the risk of leak is increased.  Examples discussed:  Avoid going in a house or riding in a car where someone has previously smoked in the last 2 weeks and for 6 weeks postoperatively.  Avoid living in a house where someone smokes (even if it's in a separate room/patio/attached garage, etc.).   Avoid congregating with a group of people who are smoking even if it's outside.  It is OK to be around wood burning fires and barbecue.  I explained that I do not know if marijuana has a same effects but my overall recommendation is to avoid it for 2 weeks prior in 6 weeks after surgery.      Discussed the risks, benefits and alternative therapies at great length as outlined in our extensive consent forms, consent videos, and educational teaching process under the direction of the center's .     A copy of the patient's signed informed consent is on file.     R/B/A Rx discussed to postop anticoagulation incl but not limited to bleeding, drug reaction, venothromboembolic events, etc. and the patient declined.\"    Plan:    Will proceed with laparoscopic sleeve gastrectomy, hiatal hernia repair and EGD. R/b/a rx discussed including but not limited to bleeding, infection, damage to surrounding structures including leak from staple line,  bowel injury, pulm complications, venothromboembolic events, death etc and wishes to proceed.  Continue on pre-op diet as discussed. Avoid ASA, NSAIDs, steroids, tramadol, OCPs, diet " "pills, tobacco/ nicotine use/ exposure.  Follow up postop as directed. Advised having postop vitamins and protein supplements ready for home d/c.  Call with questions/ concerns.      Per Dr. Viveros plan 1/10/22: \"After evaluation today I think the patient is a reasonable candidate for laparoscopic sleeve gastrectomy, hiatal hernia repair, and EGD.  Type and screen.  Once again given her BMI and poor dietary habits noted on dietitian evaluation sleeve gastrectomy may be a first stage procedure.  Other issues include hiatal hernia with GE reflux disease, anxiety and depression, anemia, H. pylori gastritis, hypertension, joint pain, migraine headaches, PCOS, prediabetes, vitamin D deficiency, elevated hemoglobin A1c, elevated ALT, elevated HDL. .\"       "

## 2022-05-03 ENCOUNTER — PRE-ADMISSION TESTING (OUTPATIENT)
Dept: PREADMISSION TESTING | Facility: HOSPITAL | Age: 27
End: 2022-05-03

## 2022-05-03 DIAGNOSIS — K21.9 HIATAL HERNIA WITH GASTROESOPHAGEAL REFLUX: ICD-10-CM

## 2022-05-03 DIAGNOSIS — K44.9 HIATAL HERNIA WITH GASTROESOPHAGEAL REFLUX: ICD-10-CM

## 2022-05-03 DIAGNOSIS — E66.01 MORBID OBESITY WITH BODY MASS INDEX OF 45.0-49.9 IN ADULT: ICD-10-CM

## 2022-05-03 LAB
ABO GROUP BLD: NORMAL
BLD GP AB SCN SERPL QL: NEGATIVE
DEPRECATED RDW RBC AUTO: 40.1 FL (ref 37–54)
ERYTHROCYTE [DISTWIDTH] IN BLOOD BY AUTOMATED COUNT: 13 % (ref 12.3–15.4)
HBA1C MFR BLD: 5.4 % (ref 4.8–5.6)
HCT VFR BLD AUTO: 42.1 % (ref 34–46.6)
HGB BLD-MCNC: 13.4 G/DL (ref 12–15.9)
MCH RBC QN AUTO: 27.2 PG (ref 26.6–33)
MCHC RBC AUTO-ENTMCNC: 31.8 G/DL (ref 31.5–35.7)
MCV RBC AUTO: 85.4 FL (ref 79–97)
PLATELET # BLD AUTO: 317 10*3/MM3 (ref 140–450)
PMV BLD AUTO: 8.6 FL (ref 6–12)
POTASSIUM SERPL-SCNC: 4.3 MMOL/L (ref 3.5–5.2)
QT INTERVAL: 384 MS
QTC INTERVAL: 434 MS
RBC # BLD AUTO: 4.93 10*6/MM3 (ref 3.77–5.28)
RH BLD: POSITIVE
SARS-COV-2 RNA PNL SPEC NAA+PROBE: NOT DETECTED
T&S EXPIRATION DATE: NORMAL
WBC NRBC COR # BLD: 7.03 10*3/MM3 (ref 3.4–10.8)

## 2022-05-03 PROCEDURE — C9803 HOPD COVID-19 SPEC COLLECT: HCPCS

## 2022-05-03 PROCEDURE — U0004 COV-19 TEST NON-CDC HGH THRU: HCPCS

## 2022-05-03 PROCEDURE — 93005 ELECTROCARDIOGRAM TRACING: CPT

## 2022-05-03 PROCEDURE — 86900 BLOOD TYPING SEROLOGIC ABO: CPT

## 2022-05-03 PROCEDURE — 85027 COMPLETE CBC AUTOMATED: CPT

## 2022-05-03 PROCEDURE — 86901 BLOOD TYPING SEROLOGIC RH(D): CPT

## 2022-05-03 PROCEDURE — 86850 RBC ANTIBODY SCREEN: CPT

## 2022-05-03 PROCEDURE — 93010 ELECTROCARDIOGRAM REPORT: CPT | Performed by: INTERNAL MEDICINE

## 2022-05-03 PROCEDURE — 36415 COLL VENOUS BLD VENIPUNCTURE: CPT

## 2022-05-03 PROCEDURE — 84132 ASSAY OF SERUM POTASSIUM: CPT

## 2022-05-03 PROCEDURE — 83036 HEMOGLOBIN GLYCOSYLATED A1C: CPT

## 2022-05-03 NOTE — PAT
Patient viewed general [facility] education video as instructed in their preoperative information received from their surgeon.  Patient stated the general [facility] education video was viewed in its entirety and survey completed.  Copies of [facility] general education handouts (Incentive Spirometry, Meds to Beds Program, Patient Belongings, Pre-op skin preparation instructions, Blood Glucose testing, Visitor policy, Surgery FAQ, Code H) distributed to patient if not printed. Education related to the PAT pass and skin preparation for surgery (if applicable) completed in [facility] as a reinforcement to PAT education video. Patient instructed to return PAT pass provided today as well as completed skin preparation sheet (if applicable) on the day of procedure.     Additionally if patient had not viewed video yet but intended to view it at home or in our waiting area, then referred them to the handout with QR code/link provided during PAT visit.  Instructed patient to complete survey after viewing the video in its entirety.  Encouraged patient/family to read [facility] general education handouts thoroughly and notify [facility] staff with any questions or concerns. Patient verbalized understanding of all information and priority content.    Patient to apply Chlorhexadine wipes  to surgical area (as instructed) the night before procedure and the AM of procedure. Wipes provided.    Patient instructed to drink 20 ounces (or until full) of Gatorade and it needs to be completed 1 hour (for Main OR patients) or 2 hours (scheduled  section patients) before given arrival time for procedure (NO RED Gatorade)    Patient verbalized understanding.    COVID TEST PERFORMED IN [facility] TODAY    Blood bank bracelet applied to patient during Pre Admission Testing visit.  Patient instructed not to remove from arm until after procedure and they are discharged from the hospital.  Explained to patient that they may shower and get the bracelet wet, but not to immerse under  water for longer periods (bathing, swimming, hand dishwashing, etc).  Patient verbalized understanding.    CARDIAC CLEARANCE FROM [facility] ON 7/21 IN [facility]  PATIENT DENIES ANY CHEST PAIN OR SHORTNESS OF BREATH

## 2022-05-04 ENCOUNTER — ANESTHESIA EVENT (OUTPATIENT)
Dept: PERIOP | Facility: HOSPITAL | Age: 27
End: 2022-05-04

## 2022-05-04 RX ORDER — FAMOTIDINE 10 MG/ML
20 INJECTION, SOLUTION INTRAVENOUS ONCE
Status: CANCELLED | OUTPATIENT
Start: 2022-05-04 | End: 2022-05-04

## 2022-05-04 RX ORDER — FAMOTIDINE 20 MG/1
20 TABLET, FILM COATED ORAL ONCE
Status: CANCELLED | OUTPATIENT
Start: 2022-05-04 | End: 2022-05-04

## 2022-05-04 RX ORDER — SODIUM CHLORIDE, SODIUM LACTATE, POTASSIUM CHLORIDE, CALCIUM CHLORIDE 600; 310; 30; 20 MG/100ML; MG/100ML; MG/100ML; MG/100ML
9 INJECTION, SOLUTION INTRAVENOUS CONTINUOUS
Status: CANCELLED | OUTPATIENT
Start: 2022-05-04

## 2022-05-05 ENCOUNTER — ANESTHESIA EVENT CONVERTED (OUTPATIENT)
Dept: ANESTHESIOLOGY | Facility: HOSPITAL | Age: 27
End: 2022-05-05

## 2022-05-05 ENCOUNTER — HOSPITAL ENCOUNTER (INPATIENT)
Facility: HOSPITAL | Age: 27
LOS: 1 days | Discharge: HOME | End: 2022-05-06
Attending: SURGERY | Admitting: SURGERY

## 2022-05-05 ENCOUNTER — ANESTHESIA (OUTPATIENT)
Dept: PERIOP | Facility: HOSPITAL | Age: 27
End: 2022-05-05

## 2022-05-05 DIAGNOSIS — E66.01 MORBID OBESITY WITH BODY MASS INDEX (BMI) OF 45.0 TO 49.9 IN ADULT: Primary | ICD-10-CM

## 2022-05-05 DIAGNOSIS — E66.01 MORBID OBESITY WITH BODY MASS INDEX OF 45.0-49.9 IN ADULT: ICD-10-CM

## 2022-05-05 DIAGNOSIS — K21.9 HIATAL HERNIA WITH GASTROESOPHAGEAL REFLUX: ICD-10-CM

## 2022-05-05 DIAGNOSIS — K44.9 HIATAL HERNIA WITH GASTROESOPHAGEAL REFLUX: ICD-10-CM

## 2022-05-05 LAB
ABO GROUP BLD: NORMAL
RH BLD: POSITIVE

## 2022-05-05 PROCEDURE — 0DJ08ZZ INSPECTION OF UPPER INTESTINAL TRACT, VIA NATURAL OR ARTIFICIAL OPENING ENDOSCOPIC: ICD-10-PCS | Performed by: SURGERY

## 2022-05-05 PROCEDURE — 25010000002 DEXAMETHASONE PER 1 MG: Performed by: NURSE ANESTHETIST, CERTIFIED REGISTERED

## 2022-05-05 PROCEDURE — 88307 TISSUE EXAM BY PATHOLOGIST: CPT | Performed by: SURGERY

## 2022-05-05 PROCEDURE — 25010000002 PROPOFOL 10 MG/ML EMULSION: Performed by: NURSE ANESTHETIST, CERTIFIED REGISTERED

## 2022-05-05 PROCEDURE — 0BQT4ZZ REPAIR DIAPHRAGM, PERCUTANEOUS ENDOSCOPIC APPROACH: ICD-10-PCS | Performed by: SURGERY

## 2022-05-05 PROCEDURE — 25010000002 NEOSTIGMINE 10 MG/10ML SOLUTION: Performed by: NURSE ANESTHETIST, CERTIFIED REGISTERED

## 2022-05-05 PROCEDURE — 94799 UNLISTED PULMONARY SVC/PX: CPT

## 2022-05-05 PROCEDURE — 86901 BLOOD TYPING SEROLOGIC RH(D): CPT

## 2022-05-05 PROCEDURE — 63710000001 ONDANSETRON PER 8 MG: Performed by: SURGERY

## 2022-05-05 PROCEDURE — 25010000002 ENOXAPARIN PER 10 MG: Performed by: SURGERY

## 2022-05-05 PROCEDURE — 86900 BLOOD TYPING SEROLOGIC ABO: CPT

## 2022-05-05 PROCEDURE — 25010000002 DEXAMETHASONE SODIUM PHOSPHATE 10 MG/ML SOLUTION: Performed by: NURSE ANESTHETIST, CERTIFIED REGISTERED

## 2022-05-05 PROCEDURE — 25010000002 GLUCAGON (HUMAN RECOMBINANT) 1 MG RECONSTITUTED SOLUTION: Performed by: NURSE ANESTHETIST, CERTIFIED REGISTERED

## 2022-05-05 PROCEDURE — 25010000002 ONDANSETRON PER 1 MG: Performed by: NURSE ANESTHETIST, CERTIFIED REGISTERED

## 2022-05-05 PROCEDURE — 25010000002 FENTANYL CITRATE (PF) 50 MCG/ML SOLUTION

## 2022-05-05 PROCEDURE — 43775 LAP SLEEVE GASTRECTOMY: CPT | Performed by: SURGERY

## 2022-05-05 PROCEDURE — 0DB64Z3 EXCISION OF STOMACH, PERCUTANEOUS ENDOSCOPIC APPROACH, VERTICAL: ICD-10-PCS | Performed by: SURGERY

## 2022-05-05 PROCEDURE — 94640 AIRWAY INHALATION TREATMENT: CPT

## 2022-05-05 PROCEDURE — 25010000002 CEFAZOLIN PER 500 MG: Performed by: SURGERY

## 2022-05-05 DEVICE — IMPLANTABLE DEVICE
Type: IMPLANTABLE DEVICE | Site: STOMACH | Status: FUNCTIONAL
Brand: TITAN SGS STANDARD GASTRIC STAPLER

## 2022-05-05 DEVICE — ABSORBABLE WOUND CLOSURE DEVICE
Type: IMPLANTABLE DEVICE | Site: ABDOMEN | Status: FUNCTIONAL
Brand: SYNETURE

## 2022-05-05 DEVICE — SEALANT WND FIBRIN TISSEEL PREFIL/SYR/PRIMAFZ 4ML: Type: IMPLANTABLE DEVICE | Site: STOMACH | Status: FUNCTIONAL

## 2022-05-05 RX ORDER — SODIUM CHLORIDE 0.9 % (FLUSH) 0.9 %
3-10 SYRINGE (ML) INJECTION AS NEEDED
Status: DISCONTINUED | OUTPATIENT
Start: 2022-05-05 | End: 2022-05-05 | Stop reason: HOSPADM

## 2022-05-05 RX ORDER — HYDROXYZINE HYDROCHLORIDE 25 MG/1
50 TABLET, FILM COATED ORAL NIGHTLY PRN
Refills: 3 | Status: DISCONTINUED | OUTPATIENT
Start: 2022-05-05 | End: 2022-05-06 | Stop reason: HOSPADM

## 2022-05-05 RX ORDER — ENOXAPARIN SODIUM 100 MG/ML
40 INJECTION SUBCUTANEOUS EVERY 12 HOURS
Status: DISCONTINUED | OUTPATIENT
Start: 2022-05-06 | End: 2022-05-06 | Stop reason: HOSPADM

## 2022-05-05 RX ORDER — CHLORHEXIDINE GLUCONATE 0.12 MG/ML
30 RINSE ORAL
Status: DISCONTINUED | OUTPATIENT
Start: 2022-05-05 | End: 2022-05-05

## 2022-05-05 RX ORDER — ALBUTEROL SULFATE 2.5 MG/3ML
2.5 SOLUTION RESPIRATORY (INHALATION)
Status: DISCONTINUED | OUTPATIENT
Start: 2022-05-05 | End: 2022-05-06 | Stop reason: HOSPADM

## 2022-05-05 RX ORDER — NALOXONE HCL 0.4 MG/ML
0.1 VIAL (ML) INJECTION
Status: DISCONTINUED | OUTPATIENT
Start: 2022-05-05 | End: 2022-05-06 | Stop reason: HOSPADM

## 2022-05-05 RX ORDER — SODIUM CHLORIDE 0.9 % (FLUSH) 0.9 %
10 SYRINGE (ML) INJECTION EVERY 12 HOURS SCHEDULED
Status: DISCONTINUED | OUTPATIENT
Start: 2022-05-05 | End: 2022-05-05 | Stop reason: HOSPADM

## 2022-05-05 RX ORDER — PROPOFOL 10 MG/ML
VIAL (ML) INTRAVENOUS AS NEEDED
Status: DISCONTINUED | OUTPATIENT
Start: 2022-05-05 | End: 2022-05-05 | Stop reason: SURG

## 2022-05-05 RX ORDER — AMLODIPINE BESYLATE 10 MG/1
10 TABLET ORAL DAILY
Status: DISCONTINUED | OUTPATIENT
Start: 2022-05-05 | End: 2022-05-05

## 2022-05-05 RX ORDER — PROMETHAZINE HYDROCHLORIDE 12.5 MG/1
12.5 TABLET ORAL EVERY 6 HOURS PRN
Status: DISCONTINUED | OUTPATIENT
Start: 2022-05-05 | End: 2022-05-06 | Stop reason: HOSPADM

## 2022-05-05 RX ORDER — PANTOPRAZOLE SODIUM 40 MG/10ML
40 INJECTION, POWDER, LYOPHILIZED, FOR SOLUTION INTRAVENOUS ONCE
Status: COMPLETED | OUTPATIENT
Start: 2022-05-05 | End: 2022-05-05

## 2022-05-05 RX ORDER — ALPRAZOLAM 0.25 MG/1
0.25 TABLET ORAL ONCE AS NEEDED
Status: DISCONTINUED | OUTPATIENT
Start: 2022-05-05 | End: 2022-05-06 | Stop reason: HOSPADM

## 2022-05-05 RX ORDER — SUMATRIPTAN 50 MG/1
25 TABLET, FILM COATED ORAL ONCE AS NEEDED
Status: DISCONTINUED | OUTPATIENT
Start: 2022-05-05 | End: 2022-05-06 | Stop reason: HOSPADM

## 2022-05-05 RX ORDER — DIPHENHYDRAMINE HYDROCHLORIDE 50 MG/ML
25 INJECTION INTRAMUSCULAR; INTRAVENOUS EVERY 4 HOURS PRN
Status: DISCONTINUED | OUTPATIENT
Start: 2022-05-05 | End: 2022-05-06 | Stop reason: HOSPADM

## 2022-05-05 RX ORDER — ONDANSETRON 4 MG/1
4 TABLET, FILM COATED ORAL EVERY 6 HOURS PRN
Status: DISCONTINUED | OUTPATIENT
Start: 2022-05-09 | End: 2022-05-06 | Stop reason: HOSPADM

## 2022-05-05 RX ORDER — LIDOCAINE HYDROCHLORIDE 10 MG/ML
0.5 INJECTION, SOLUTION EPIDURAL; INFILTRATION; INTRACAUDAL; PERINEURAL ONCE AS NEEDED
Status: COMPLETED | OUTPATIENT
Start: 2022-05-05 | End: 2022-05-05

## 2022-05-05 RX ORDER — NALOXONE HCL 0.4 MG/ML
0.4 VIAL (ML) INJECTION
Status: DISCONTINUED | OUTPATIENT
Start: 2022-05-05 | End: 2022-05-06 | Stop reason: HOSPADM

## 2022-05-05 RX ORDER — MONTELUKAST SODIUM 10 MG/1
10 TABLET ORAL NIGHTLY
Status: DISCONTINUED | OUTPATIENT
Start: 2022-05-05 | End: 2022-05-06 | Stop reason: HOSPADM

## 2022-05-05 RX ORDER — PANTOPRAZOLE SODIUM 40 MG/10ML
40 INJECTION, POWDER, LYOPHILIZED, FOR SOLUTION INTRAVENOUS
Status: DISCONTINUED | OUTPATIENT
Start: 2022-05-06 | End: 2022-05-06 | Stop reason: HOSPADM

## 2022-05-05 RX ORDER — METOPROLOL SUCCINATE 100 MG/1
100 TABLET, EXTENDED RELEASE ORAL DAILY
Status: DISCONTINUED | OUTPATIENT
Start: 2022-05-05 | End: 2022-05-05

## 2022-05-05 RX ORDER — BACLOFEN 10 MG/1
10 TABLET ORAL NIGHTLY PRN
Status: DISCONTINUED | OUTPATIENT
Start: 2022-05-05 | End: 2022-05-06 | Stop reason: HOSPADM

## 2022-05-05 RX ORDER — HYDRALAZINE HYDROCHLORIDE 20 MG/ML
10 INJECTION INTRAMUSCULAR; INTRAVENOUS
Status: DISCONTINUED | OUTPATIENT
Start: 2022-05-05 | End: 2022-05-06 | Stop reason: HOSPADM

## 2022-05-05 RX ORDER — LORAZEPAM 2 MG/ML
0.5 INJECTION INTRAMUSCULAR EVERY 12 HOURS PRN
Status: DISCONTINUED | OUTPATIENT
Start: 2022-05-05 | End: 2022-05-06 | Stop reason: HOSPADM

## 2022-05-05 RX ORDER — CEFAZOLIN SODIUM IN 0.9 % NACL 3 G/100 ML
3 INTRAVENOUS SOLUTION, PIGGYBACK (ML) INTRAVENOUS ONCE
Status: COMPLETED | OUTPATIENT
Start: 2022-05-05 | End: 2022-05-05

## 2022-05-05 RX ORDER — SODIUM CHLORIDE, SODIUM LACTATE, POTASSIUM CHLORIDE, CALCIUM CHLORIDE 600; 310; 30; 20 MG/100ML; MG/100ML; MG/100ML; MG/100ML
150 INJECTION, SOLUTION INTRAVENOUS CONTINUOUS
Status: DISCONTINUED | OUTPATIENT
Start: 2022-05-05 | End: 2022-05-06 | Stop reason: HOSPADM

## 2022-05-05 RX ORDER — SODIUM CHLORIDE 0.9 % (FLUSH) 0.9 %
10 SYRINGE (ML) INJECTION AS NEEDED
Status: DISCONTINUED | OUTPATIENT
Start: 2022-05-05 | End: 2022-05-05 | Stop reason: HOSPADM

## 2022-05-05 RX ORDER — MORPHINE SULFATE 4 MG/ML
4 INJECTION, SOLUTION INTRAMUSCULAR; INTRAVENOUS
Status: DISCONTINUED | OUTPATIENT
Start: 2022-05-05 | End: 2022-05-06 | Stop reason: HOSPADM

## 2022-05-05 RX ORDER — FENTANYL CITRATE 50 UG/ML
50 INJECTION, SOLUTION INTRAMUSCULAR; INTRAVENOUS
Status: DISCONTINUED | OUTPATIENT
Start: 2022-05-05 | End: 2022-05-05 | Stop reason: HOSPADM

## 2022-05-05 RX ORDER — GABAPENTIN 100 MG/1
100 CAPSULE ORAL 3 TIMES DAILY
Status: DISCONTINUED | OUTPATIENT
Start: 2022-05-05 | End: 2022-05-06 | Stop reason: HOSPADM

## 2022-05-05 RX ORDER — DESVENLAFAXINE SUCCINATE 50 MG/1
100 TABLET, EXTENDED RELEASE ORAL DAILY
Status: DISCONTINUED | OUTPATIENT
Start: 2022-05-05 | End: 2022-05-05

## 2022-05-05 RX ORDER — ROCURONIUM BROMIDE 10 MG/ML
INJECTION, SOLUTION INTRAVENOUS AS NEEDED
Status: DISCONTINUED | OUTPATIENT
Start: 2022-05-05 | End: 2022-05-05 | Stop reason: SURG

## 2022-05-05 RX ORDER — SODIUM CHLORIDE 0.9 % (FLUSH) 0.9 %
3 SYRINGE (ML) INJECTION EVERY 12 HOURS SCHEDULED
Status: DISCONTINUED | OUTPATIENT
Start: 2022-05-05 | End: 2022-05-05 | Stop reason: HOSPADM

## 2022-05-05 RX ORDER — SODIUM CHLORIDE, SODIUM LACTATE, POTASSIUM CHLORIDE, CALCIUM CHLORIDE 600; 310; 30; 20 MG/100ML; MG/100ML; MG/100ML; MG/100ML
150 INJECTION, SOLUTION INTRAVENOUS CONTINUOUS
Status: DISCONTINUED | OUTPATIENT
Start: 2022-05-05 | End: 2022-05-05

## 2022-05-05 RX ORDER — PROCHLORPERAZINE MALEATE 10 MG
10 TABLET ORAL EVERY 6 HOURS PRN
Status: DISCONTINUED | OUTPATIENT
Start: 2022-05-05 | End: 2022-05-06 | Stop reason: HOSPADM

## 2022-05-05 RX ORDER — FENTANYL CITRATE 50 UG/ML
INJECTION, SOLUTION INTRAMUSCULAR; INTRAVENOUS
Status: COMPLETED
Start: 2022-05-05 | End: 2022-05-05

## 2022-05-05 RX ORDER — NEOSTIGMINE METHYLSULFATE 1 MG/ML
INJECTION, SOLUTION INTRAVENOUS AS NEEDED
Status: DISCONTINUED | OUTPATIENT
Start: 2022-05-05 | End: 2022-05-05 | Stop reason: SURG

## 2022-05-05 RX ORDER — GABAPENTIN 300 MG/1
600 CAPSULE ORAL ONCE
Status: COMPLETED | OUTPATIENT
Start: 2022-05-05 | End: 2022-05-05

## 2022-05-05 RX ORDER — BUPIVACAINE HCL/0.9 % NACL/PF 0.125 %
PLASTIC BAG, INJECTION (ML) EPIDURAL AS NEEDED
Status: DISCONTINUED | OUTPATIENT
Start: 2022-05-05 | End: 2022-05-05 | Stop reason: SURG

## 2022-05-05 RX ORDER — ACETAMINOPHEN 500 MG
1000 TABLET ORAL ONCE
Status: COMPLETED | OUTPATIENT
Start: 2022-05-05 | End: 2022-05-05

## 2022-05-05 RX ORDER — ONDANSETRON 2 MG/ML
INJECTION INTRAMUSCULAR; INTRAVENOUS AS NEEDED
Status: DISCONTINUED | OUTPATIENT
Start: 2022-05-05 | End: 2022-05-05 | Stop reason: SURG

## 2022-05-05 RX ORDER — CEFAZOLIN SODIUM IN 0.9 % NACL 3 G/100 ML
3 INTRAVENOUS SOLUTION, PIGGYBACK (ML) INTRAVENOUS EVERY 8 HOURS
Status: COMPLETED | OUTPATIENT
Start: 2022-05-05 | End: 2022-05-06

## 2022-05-05 RX ORDER — METOPROLOL SUCCINATE 100 MG/1
100 TABLET, EXTENDED RELEASE ORAL DAILY
Status: DISCONTINUED | OUTPATIENT
Start: 2022-05-05 | End: 2022-05-06 | Stop reason: HOSPADM

## 2022-05-05 RX ORDER — DEXAMETHASONE SODIUM PHOSPHATE 4 MG/ML
INJECTION, SOLUTION INTRA-ARTICULAR; INTRALESIONAL; INTRAMUSCULAR; INTRAVENOUS; SOFT TISSUE AS NEEDED
Status: DISCONTINUED | OUTPATIENT
Start: 2022-05-05 | End: 2022-05-05 | Stop reason: SURG

## 2022-05-05 RX ORDER — HYDROMORPHONE HYDROCHLORIDE 2 MG/1
2 TABLET ORAL EVERY 4 HOURS PRN
Status: DISCONTINUED | OUTPATIENT
Start: 2022-05-05 | End: 2022-05-06 | Stop reason: HOSPADM

## 2022-05-05 RX ORDER — DESVENLAFAXINE SUCCINATE 50 MG/1
100 TABLET, EXTENDED RELEASE ORAL DAILY
Status: DISCONTINUED | OUTPATIENT
Start: 2022-05-05 | End: 2022-05-06 | Stop reason: HOSPADM

## 2022-05-05 RX ORDER — SIMETHICONE 80 MG
80 TABLET,CHEWABLE ORAL 4 TIMES DAILY PRN
Status: DISCONTINUED | OUTPATIENT
Start: 2022-05-05 | End: 2022-05-06 | Stop reason: HOSPADM

## 2022-05-05 RX ORDER — GLYCOPYRROLATE 0.2 MG/ML
INJECTION INTRAMUSCULAR; INTRAVENOUS AS NEEDED
Status: DISCONTINUED | OUTPATIENT
Start: 2022-05-05 | End: 2022-05-05 | Stop reason: SURG

## 2022-05-05 RX ORDER — GABAPENTIN 250 MG/5ML
100 SOLUTION ORAL 3 TIMES DAILY
Status: DISCONTINUED | OUTPATIENT
Start: 2022-05-05 | End: 2022-05-06 | Stop reason: HOSPADM

## 2022-05-05 RX ORDER — CYANOCOBALAMIN 1000 UG/ML
1000 INJECTION, SOLUTION INTRAMUSCULAR; SUBCUTANEOUS ONCE
Status: COMPLETED | OUTPATIENT
Start: 2022-05-06 | End: 2022-05-06

## 2022-05-05 RX ORDER — MIDAZOLAM HYDROCHLORIDE 1 MG/ML
1 INJECTION INTRAMUSCULAR; INTRAVENOUS
Status: DISCONTINUED | OUTPATIENT
Start: 2022-05-05 | End: 2022-05-05 | Stop reason: HOSPADM

## 2022-05-05 RX ORDER — ONDANSETRON 2 MG/ML
INJECTION INTRAMUSCULAR; INTRAVENOUS
Status: DISPENSED
Start: 2022-05-05 | End: 2022-05-05

## 2022-05-05 RX ORDER — HYDROMORPHONE HYDROCHLORIDE 1 MG/ML
0.5 INJECTION, SOLUTION INTRAMUSCULAR; INTRAVENOUS; SUBCUTANEOUS
Status: DISCONTINUED | OUTPATIENT
Start: 2022-05-05 | End: 2022-05-05 | Stop reason: HOSPADM

## 2022-05-05 RX ORDER — LORAZEPAM 1 MG/1
1 TABLET ORAL EVERY 12 HOURS PRN
Status: DISCONTINUED | OUTPATIENT
Start: 2022-05-05 | End: 2022-05-06 | Stop reason: HOSPADM

## 2022-05-05 RX ORDER — ACETAMINOPHEN 500 MG
1000 TABLET ORAL EVERY 8 HOURS SCHEDULED
Status: DISCONTINUED | OUTPATIENT
Start: 2022-05-05 | End: 2022-05-06 | Stop reason: HOSPADM

## 2022-05-05 RX ORDER — AMLODIPINE BESYLATE 10 MG/1
10 TABLET ORAL DAILY
Status: DISCONTINUED | OUTPATIENT
Start: 2022-05-05 | End: 2022-05-06 | Stop reason: HOSPADM

## 2022-05-05 RX ORDER — ACETAMINOPHEN 160 MG/5ML
1000 SOLUTION ORAL EVERY 8 HOURS SCHEDULED
Status: DISCONTINUED | OUTPATIENT
Start: 2022-05-05 | End: 2022-05-06 | Stop reason: HOSPADM

## 2022-05-05 RX ORDER — FLUTICASONE PROPIONATE 50 MCG
2 SPRAY, SUSPENSION (ML) NASAL DAILY
Status: DISCONTINUED | OUTPATIENT
Start: 2022-05-06 | End: 2022-05-06 | Stop reason: HOSPADM

## 2022-05-05 RX ORDER — ENOXAPARIN SODIUM 100 MG/ML
40 INJECTION SUBCUTANEOUS ONCE
Status: DISCONTINUED | OUTPATIENT
Start: 2022-05-05 | End: 2022-05-05 | Stop reason: HOSPADM

## 2022-05-05 RX ORDER — EPHEDRINE SULFATE 50 MG/ML
INJECTION, SOLUTION INTRAVENOUS AS NEEDED
Status: DISCONTINUED | OUTPATIENT
Start: 2022-05-05 | End: 2022-05-05 | Stop reason: SURG

## 2022-05-05 RX ORDER — ONDANSETRON 2 MG/ML
4 INJECTION INTRAMUSCULAR; INTRAVENOUS ONCE AS NEEDED
Status: DISCONTINUED | OUTPATIENT
Start: 2022-05-05 | End: 2022-05-05 | Stop reason: HOSPADM

## 2022-05-05 RX ORDER — SCOLOPAMINE TRANSDERMAL SYSTEM 1 MG/1
1 PATCH, EXTENDED RELEASE TRANSDERMAL ONCE
Status: DISCONTINUED | OUTPATIENT
Start: 2022-05-05 | End: 2022-05-05

## 2022-05-05 RX ORDER — OXYCODONE HYDROCHLORIDE 5 MG/1
5 TABLET ORAL EVERY 6 HOURS PRN
Status: DISCONTINUED | OUTPATIENT
Start: 2022-05-05 | End: 2022-05-06 | Stop reason: HOSPADM

## 2022-05-05 RX ORDER — DEXAMETHASONE SODIUM PHOSPHATE 10 MG/ML
INJECTION, SOLUTION INTRAMUSCULAR; INTRAVENOUS
Status: COMPLETED | OUTPATIENT
Start: 2022-05-05 | End: 2022-05-05

## 2022-05-05 RX ORDER — ONDANSETRON 2 MG/ML
4 INJECTION INTRAMUSCULAR; INTRAVENOUS EVERY 6 HOURS PRN
Status: DISCONTINUED | OUTPATIENT
Start: 2022-05-05 | End: 2022-05-06 | Stop reason: HOSPADM

## 2022-05-05 RX ORDER — ONDANSETRON 4 MG/1
4 TABLET, FILM COATED ORAL EVERY 4 HOURS PRN
Status: DISCONTINUED | OUTPATIENT
Start: 2022-05-05 | End: 2022-05-06 | Stop reason: HOSPADM

## 2022-05-05 RX ORDER — CETIRIZINE HYDROCHLORIDE 10 MG/1
10 TABLET ORAL NIGHTLY
Status: DISCONTINUED | OUTPATIENT
Start: 2022-05-05 | End: 2022-05-06 | Stop reason: HOSPADM

## 2022-05-05 RX ORDER — MAGNESIUM HYDROXIDE 1200 MG/15ML
LIQUID ORAL AS NEEDED
Status: DISCONTINUED | OUTPATIENT
Start: 2022-05-05 | End: 2022-05-05 | Stop reason: HOSPADM

## 2022-05-05 RX ORDER — BUPIVACAINE HYDROCHLORIDE 2.5 MG/ML
INJECTION, SOLUTION EPIDURAL; INFILTRATION; INTRACAUDAL
Status: COMPLETED | OUTPATIENT
Start: 2022-05-05 | End: 2022-05-05

## 2022-05-05 RX ORDER — ENOXAPARIN SODIUM 100 MG/ML
INJECTION SUBCUTANEOUS AS NEEDED
Status: DISCONTINUED | OUTPATIENT
Start: 2022-05-05 | End: 2022-05-05 | Stop reason: HOSPADM

## 2022-05-05 RX ORDER — LIDOCAINE HYDROCHLORIDE 10 MG/ML
INJECTION, SOLUTION EPIDURAL; INFILTRATION; INTRACAUDAL; PERINEURAL AS NEEDED
Status: DISCONTINUED | OUTPATIENT
Start: 2022-05-05 | End: 2022-05-05 | Stop reason: SURG

## 2022-05-05 RX ORDER — METOCLOPRAMIDE HYDROCHLORIDE 5 MG/ML
10 INJECTION INTRAMUSCULAR; INTRAVENOUS EVERY 6 HOURS PRN
Status: DISCONTINUED | OUTPATIENT
Start: 2022-05-05 | End: 2022-05-06 | Stop reason: HOSPADM

## 2022-05-05 RX ORDER — SODIUM CHLORIDE AND POTASSIUM CHLORIDE 150; 450 MG/100ML; MG/100ML
125 INJECTION, SOLUTION INTRAVENOUS CONTINUOUS
Status: DISCONTINUED | OUTPATIENT
Start: 2022-05-06 | End: 2022-05-06 | Stop reason: HOSPADM

## 2022-05-05 RX ADMIN — CEFAZOLIN 3 G: 10 INJECTION, POWDER, FOR SOLUTION INTRAVENOUS at 17:24

## 2022-05-05 RX ADMIN — ROCURONIUM BROMIDE 50 MG: 10 INJECTION, SOLUTION INTRAVENOUS at 09:59

## 2022-05-05 RX ADMIN — EPHEDRINE SULFATE 10 MG: 50 INJECTION INTRAVENOUS at 10:31

## 2022-05-05 RX ADMIN — SODIUM CHLORIDE, POTASSIUM CHLORIDE, SODIUM LACTATE AND CALCIUM CHLORIDE: 600; 310; 30; 20 INJECTION, SOLUTION INTRAVENOUS at 11:20

## 2022-05-05 RX ADMIN — FENTANYL CITRATE 50 MCG: 0.05 INJECTION, SOLUTION INTRAMUSCULAR; INTRAVENOUS at 11:31

## 2022-05-05 RX ADMIN — DEXAMETHASONE SODIUM PHOSPHATE 8 MG: 4 INJECTION, SOLUTION INTRA-ARTICULAR; INTRALESIONAL; INTRAMUSCULAR; INTRAVENOUS; SOFT TISSUE at 10:05

## 2022-05-05 RX ADMIN — LIDOCAINE HYDROCHLORIDE 50 MG: 10 INJECTION, SOLUTION EPIDURAL; INFILTRATION; INTRACAUDAL; PERINEURAL at 09:59

## 2022-05-05 RX ADMIN — OXYCODONE HYDROCHLORIDE 5 MG: 5 TABLET ORAL at 15:15

## 2022-05-05 RX ADMIN — GABAPENTIN 100 MG: 100 CAPSULE ORAL at 20:57

## 2022-05-05 RX ADMIN — ALBUTEROL SULFATE 2.5 MG: 2.5 SOLUTION RESPIRATORY (INHALATION) at 13:27

## 2022-05-05 RX ADMIN — Medication 3 G: at 10:03

## 2022-05-05 RX ADMIN — GLUCAGON HYDROCHLORIDE 1 MG: KIT at 10:44

## 2022-05-05 RX ADMIN — GABAPENTIN 600 MG: 300 CAPSULE ORAL at 07:43

## 2022-05-05 RX ADMIN — PANTOPRAZOLE SODIUM 40 MG: 40 INJECTION, POWDER, FOR SOLUTION INTRAVENOUS at 07:51

## 2022-05-05 RX ADMIN — SODIUM CHLORIDE, POTASSIUM CHLORIDE, SODIUM LACTATE AND CALCIUM CHLORIDE 150 ML/HR: 600; 310; 30; 20 INJECTION, SOLUTION INTRAVENOUS at 20:58

## 2022-05-05 RX ADMIN — GLYCOPYRROLATE 0.4 MG: 0.2 INJECTION INTRAMUSCULAR; INTRAVENOUS at 10:25

## 2022-05-05 RX ADMIN — PROPOFOL 240 MG: 10 INJECTION, EMULSION INTRAVENOUS at 09:59

## 2022-05-05 RX ADMIN — OXYCODONE HYDROCHLORIDE 5 MG: 5 TABLET ORAL at 21:00

## 2022-05-05 RX ADMIN — CETIRIZINE HYDROCHLORIDE 10 MG: 10 TABLET, FILM COATED ORAL at 20:56

## 2022-05-05 RX ADMIN — GABAPENTIN 100 MG: 100 CAPSULE ORAL at 15:05

## 2022-05-05 RX ADMIN — Medication 100 MCG: at 10:33

## 2022-05-05 RX ADMIN — TRAZODONE HYDROCHLORIDE 150 MG: 100 TABLET ORAL at 20:57

## 2022-05-05 RX ADMIN — ONDANSETRON 4 MG: 2 INJECTION INTRAMUSCULAR; INTRAVENOUS at 11:20

## 2022-05-05 RX ADMIN — SIMETHICONE 80 MG: 80 TABLET, CHEWABLE ORAL at 20:57

## 2022-05-05 RX ADMIN — DESVENLAFAXINE SUCCINATE 100 MG: 50 TABLET, EXTENDED RELEASE ORAL at 20:57

## 2022-05-05 RX ADMIN — DEXAMETHASONE SODIUM PHOSPHATE 4 MG: 10 INJECTION, SOLUTION INTRAMUSCULAR; INTRAVENOUS at 09:32

## 2022-05-05 RX ADMIN — METOPROLOL SUCCINATE 100 MG: 100 TABLET, EXTENDED RELEASE ORAL at 20:57

## 2022-05-05 RX ADMIN — SODIUM CHLORIDE, POTASSIUM CHLORIDE, SODIUM LACTATE AND CALCIUM CHLORIDE 150 ML/HR: 600; 310; 30; 20 INJECTION, SOLUTION INTRAVENOUS at 13:37

## 2022-05-05 RX ADMIN — ONDANSETRON HYDROCHLORIDE 4 MG: 4 TABLET, FILM COATED ORAL at 15:14

## 2022-05-05 RX ADMIN — ACETAMINOPHEN 1000 MG: 500 TABLET ORAL at 21:00

## 2022-05-05 RX ADMIN — SIMETHICONE 80 MG: 80 TABLET, CHEWABLE ORAL at 13:38

## 2022-05-05 RX ADMIN — AMLODIPINE BESYLATE 10 MG: 10 TABLET ORAL at 20:57

## 2022-05-05 RX ADMIN — ALBUTEROL SULFATE 2.5 MG: 2.5 SOLUTION RESPIRATORY (INHALATION) at 19:50

## 2022-05-05 RX ADMIN — GLYCOPYRROLATE 0.4 MG: 0.2 INJECTION INTRAMUSCULAR; INTRAVENOUS at 11:20

## 2022-05-05 RX ADMIN — BUPIVACAINE HYDROCHLORIDE 60 ML: 2.5 INJECTION, SOLUTION EPIDURAL; INFILTRATION; INTRACAUDAL; PERINEURAL at 09:32

## 2022-05-05 RX ADMIN — ACETAMINOPHEN 1000 MG: 500 TABLET ORAL at 15:05

## 2022-05-05 RX ADMIN — SODIUM CHLORIDE, POTASSIUM CHLORIDE, SODIUM LACTATE AND CALCIUM CHLORIDE: 600; 310; 30; 20 INJECTION, SOLUTION INTRAVENOUS at 09:53

## 2022-05-05 RX ADMIN — PROPOFOL 25 MCG/KG/MIN: 10 INJECTION, EMULSION INTRAVENOUS at 09:59

## 2022-05-05 RX ADMIN — ACETAMINOPHEN 1000 MG: 500 TABLET ORAL at 07:43

## 2022-05-05 RX ADMIN — FENTANYL CITRATE 50 MCG: 50 INJECTION, SOLUTION INTRAMUSCULAR; INTRAVENOUS at 11:31

## 2022-05-05 RX ADMIN — CHLORHEXIDINE GLUCONATE 0.12% ORAL RINSE 30 ML: 1.2 LIQUID ORAL at 07:44

## 2022-05-05 RX ADMIN — SODIUM CHLORIDE, POTASSIUM CHLORIDE, SODIUM LACTATE AND CALCIUM CHLORIDE 1000 ML: 600; 310; 30; 20 INJECTION, SOLUTION INTRAVENOUS at 07:44

## 2022-05-05 RX ADMIN — NEOSTIGMINE METHYLSULFATE 2.5 MG: 0.5 INJECTION INTRAVENOUS at 11:20

## 2022-05-05 RX ADMIN — LIDOCAINE HYDROCHLORIDE 0.5 ML: 10 INJECTION, SOLUTION EPIDURAL; INFILTRATION; INTRACAUDAL; PERINEURAL at 07:44

## 2022-05-05 RX ADMIN — SCOPALAMINE 1 PATCH: 1 PATCH, EXTENDED RELEASE TRANSDERMAL at 07:41

## 2022-05-05 RX ADMIN — MONTELUKAST 10 MG: 10 TABLET, FILM COATED ORAL at 20:57

## 2022-05-05 NOTE — ANESTHESIA PREPROCEDURE EVALUATION
Anesthesia Evaluation     Patient summary reviewed and Nursing notes reviewed   no history of anesthetic complications:  NPO Solid Status: > 8 hours  NPO Liquid Status: > 2 hours           Airway   Mallampati: II  TM distance: >3 FB  Neck ROM: full  No difficulty expected  Dental - normal exam     Pulmonary - negative pulmonary ROS and normal exam    breath sounds clear to auscultation  Cardiovascular - normal exam    ECG reviewed  Rhythm: regular  Rate: normal    (+) hypertension,       Neuro/Psych- negative ROS  GI/Hepatic/Renal/Endo    (+) morbid obesity, GERD poorly controlled,      Musculoskeletal (-) negative ROS    Abdominal    Substance History      OB/GYN          Other - negative ROS                       Anesthesia Plan    ASA 3     general with block   (Bilateral TAP blocks post-induction for post-operative analgesia per request of Dr. Viveros)  intravenous induction     Anesthetic plan, all risks, benefits, and alternatives have been provided, discussed and informed consent has been obtained with: patient.    Plan discussed with CRNA.        CODE STATUS:

## 2022-05-05 NOTE — ANESTHESIA PROCEDURE NOTES
Airway  Urgency: elective    Date/Time: 5/5/2022 10:13 AM  Airway not difficult    General Information and Staff    Patient location during procedure: OR    Indications and Patient Condition  Indications for airway management: airway protection    Preoxygenated: yes  MILS not maintained throughout  Mask difficulty assessment: 1 - vent by mask    Final Airway Details  Final airway type: endotracheal airway      Successful airway: ETT  Cuffed: yes   Successful intubation technique: direct laryngoscopy  Endotracheal tube insertion site: oral  Blade: Chelsi  Blade size: 3  ETT size (mm): 7.5  Cormack-Lehane Classification: grade I - full view of glottis  Placement verified by: chest auscultation and capnometry   Measured from: lips  ETT/EBT  to lips (cm): 20  Number of attempts at approach: 1  Assessment: lips, teeth, and gum same as pre-op and atraumatic intubation    Additional Comments  Negative epigastric sounds, Breath sound equal bilaterally with symmetric chest rise and fall

## 2022-05-05 NOTE — OP NOTE
Preoperative Diagnosis:   Super morbid obesity (137 kg/BMI 50.42) with Multiple Co-Morbidities, hiatal hernia    Postoperative Diagnosis:   Same    Procedure:                                                      Laparoscopic Sleeve Gastrectomy (85% subtotal vertical gastrectomy, Titan ([id number])                                  Laparoscopic hiatal hernia repair (not paraesophageal)                                                                                                                     EGD                                                                       Surgeon:                                                       NIKKI Viveros MD    Anesthesia:                                                   XOCHITL    EBL:                                                              Minimal    Fluids:                                                           Crystalloid    Specimens:                                                   Subtotal gastrectomy    Drains:                                                           None    Counts:                                                          Correct    Complications:                                               None    Indications:   This is a 27 year-old super morbidly obese female who presents for elective laparoscopic sleeve gastrectomy, hiatal hernia repair, and EGD.  She's undergone our extensive preoperative education teaching and consent process everything's in order and she wishes to proceed.      Operative technique:     The patient was brought to the operating room, and placed supine upon the operating room table. SCD hose were placed, she underwent uneventful general endotracheal anesthesia per the anesthesiology staff, she received IV Ancef and subcutaneous Lovenox, the anesthesiology staff performed a tap block, and her abdomen was prepped and draped with ChloraPrep in a sterile fashion, an Ioban was used as well, a Corey catheter was not  placed.    The peritoneal cavity was entered in the high in the left midclavicular line using an 11 mm fascial splitting trocar utilizing an Optiview technique and the abdomen was insufflated to a pressure of 15 mmHg with CO2 gas.  Exploratory laparoscopy revealed no evidence of injury from the entrance technique, a normal-appearing liver, status postcholecystectomy, mild rectus diastases.    Remaining trocars were placed under direct visualization including an additional 11 mm trocar in the upper abdomen to the left of midline, 5 mm trochars in the right and left lateral abdomen, and a 19 mm trocar to the right of the umbilicus.    Through a stab incision in the epigastrium a Jordyn retractor was used to elevate the left lobe of the liver.   The hiatal hernia was not readily view and this was photodocumented.  Beginning approximately two thirds of the way around the greater curvature the stomach, the gastrocolic vessels were divided using the Enseal device.  This proceeded proximally taking down all the short gastric vessels and exposing the left ada.  The hiatal hernia remains subtle from the posterior lateral view as well and once again photos obtained.    The hernia sac was incised along the base of the left ada and extended up and across the phrenoesophageal membrane.  The pars flaccida was divided, there was not a replaced hepatic vessel.  The hernia sac was incised along the base of the right ada and also extended up and across the phrenoesophageal membrane.  The hernia sac and its contents were dissected out of the mediastinum and reduced below the level of the crura.  There was not a paraesophageal component.  A latex free drain was used temporarily for esophageal retraction.  The GE junction was lengthened to well below the level of the crura by dissecting loose areolar tissue well into the mediastinum.  The crura were dissected to their meeting point inferiorly.  The anterior and posterior vagus  nerves were preserved.  The hiatal hernia repair was performed posteriorly using a running nonabsorbable 2-0  V-Loc suture with good result, photodocumentation obtained before and after repair.    Gastrocolic vessels were then divided medially to a few cm proximal to the pylorus.  Adhesions of the posterior stomach to the pancreas and retroperitoneum were divided.  The stomach was marked with a Kitner saturated with a marking pen 1 cm lateral to the angle of His, 3 cm away from the angularis, and 6 cm from the pylorus.  1 mg glucagon IV given.  The previously placed 38 Burmese balloon bougie was advanced into the distal antrum and the balloon insufflated with 15 cc of saline.    The Titan stapler was positioned along the markings with the calibration balloon at the angularis and the stapler was closed.  The calibration bougie was desufflated and removed.  The 85% subtotal vertical sleeve gastrectomy was then performed with a single firing using the Titan stapler ([id number]).    The Titan stapler was removed.  The subtotal gastrectomy specimen was retrieved through the 19 mm trocar site incision, inspected, and sent unopened to pathology for permanent section.  It was an average size specimen.  The sleeve was submerged under saline.  Upper endoscopy was performed, and the endoscope was advanced into the duodenal bulb.  No air bubbles or leak seen, no bleeding at the staple line, no narrowing at the angularis, no pyloric spasm or deformity, no gastritis, no hiatal hernia or Maxwell's esophagus, Z-line approximately 37 cm, and the endoscope was withdrawn.  Endoscopic photodocumentation obtained of the GE junction and widely patent pylorus.  Irrigation fluid was suctioned free.  The sleeve was resting nicely and hemostatic.  The sleeve staple line were treated with a total of 4 cc of aerosolized Tisseel fibrin glue.  Photodocumentation of the sleeve obtained before and after endoscopy.  The Jordyn retractor was removed.   Fascia at the 19 mm trocar site incision was closed with a horizontal mattress 0 Vicryl suture placed with a suture passer under direct visualization and tying the knot extracorporeally.  Remaining trocars were removed under direct visualization, no bleeding noted from their sites.  Skin in each incision was closed using 3-0 Monocryl plus in an interrupted subcuticular stitch followed by skin glue.  The patient tolerated the procedure well without complication, was taken to the recovery room in stable condition.

## 2022-05-05 NOTE — BRIEF OP NOTE
GASTRIC SLEEVE LAPAROSCOPIC, HIATAL HERNIA REPAIR LAPAROSCOPIC, ESOPHAGOGASTRODUODENOSCOPY  Progress Note    Valery Hernandez  5/5/2022    Pre-op Diagnosis:   Morbid obesity with body mass index of 45.0-49.9 in adult ([facility]) [E66.01, Z68.42]  Hiatal hernia with gastroesophageal reflux [K21.9, K44.9]       Post-Op Diagnosis Codes:     * Morbid obesity with body mass index of 45.0-49.9 in adult ([facility]) [E66.01, Z68.42]     * Hiatal hernia with gastroesophageal reflux [K44.9, K21.9]    Procedure/CPT® Codes:  MI LAP, ROCIO RESTRICT PROC, LONGITUDINAL GASTRECTOMY [03636]  MI LAP,ESOPHAGOGAST FUNDOPLASTY [80594]  MI ESOPHAGOGASTRODUODENOSCOPY TRANSORAL DIAGNOSTIC [16167]      Procedure(s):  GASTRIC SLEEVE LAPAROSCOPIC  HIATAL HERNIA REPAIR LAPAROSCOPIC  ESOPHAGOGASTRODUODENOSCOPY    Surgeon(s):  Junaid Viveros MD    Anesthesia: General with Block    Staff:   Circulator: Cathleen Lawrence RN  Scrub Person: Daniella Al; Gillian Ambrocio  Nursing Assistant: Christina Bangura CNA         Estimated Blood Loss: minimal    Urine Voided: * No values recorded between 5/5/2022  9:53 AM and 5/5/2022 11:08 AM *    Specimens:                Specimens     ID Source Type Tests Collected By Collected At Frozen?    A Stomach Tissue · TISSUE PATHOLOGY EXAM   Junaid Viveros MD 5/5/22 [id number] No    Description: SUB-TOTAL GASTRECTOMY    This specimen was not marked as sent.                Drains: * No LDAs found *    Findings:         Complications: none          Junaid Viveros MD     Date: 5/5/2022  Time: 11:15 EDT

## 2022-05-05 NOTE — ANESTHESIA POSTPROCEDURE EVALUATION
Patient: Valery Hernandez    Procedure Summary     Date: 05/05/22 Room / Location:  [facility]  /  [facility]    Anesthesia Start: 0953 Anesthesia Stop:     Procedures:       GASTRIC SLEEVE LAPAROSCOPIC (N/A Abdomen)      HIATAL HERNIA REPAIR LAPAROSCOPIC (N/A Abdomen)      ESOPHAGOGASTRODUODENOSCOPY (N/A Esophagus) Diagnosis:       Morbid obesity with body mass index of 45.0-49.9 in adult ([facility])      Hiatal hernia with gastroesophageal reflux      (Morbid obesity with body mass index of 45.0-49.9 in adult ([facility]) [E66.01, Z68.42])      (Hiatal hernia with gastroesophageal reflux [K21.9, K44.9])    Surgeons: Junaid Viveros MD Provider: Raman Villarreal MD    Anesthesia Type: general with block ASA Status: 3          Anesthesia Type: general with block    Vitals  Vitals Value Taken Time   /72 05/05/22 1127   Temp     Pulse 89 05/05/22 1129   Resp     SpO2 91 % 05/05/22 1129   Vitals shown include unvalidated device data.        Post Anesthesia Care and Evaluation    Patient location during evaluation: PACU  Patient participation: complete - patient participated  Level of consciousness: awake and alert  Pain management: adequate  Airway patency: patent  Anesthetic complications: No anesthetic complications  PONV Status: none  Cardiovascular status: hemodynamically stable and acceptable  Respiratory status: nonlabored ventilation, acceptable and nasal cannula  Hydration status: acceptable    Comments: 98.0 [id number]

## 2022-05-05 NOTE — CASE MANAGEMENT/SOCIAL WORK
Discharge Planning Assessment  [facility]     Patient Name: Valery Hernandez  MRN: [id number]  Today's Date: 5/5/2022    Admit Date: 5/5/2022     Discharge Needs Assessment     Row Name 05/05/22 1516       Living Environment    People in Home friend(s)  pt resides in [place]    Name(s) of People in Home friend- Cathleen    Current Living Arrangements home    Primary Care Provided by self    Provides Primary Care For no one    Family Caregiver if Needed friend(s)    Family Caregiver Names Cathleen    Quality of Family Relationships helpful;involved;supportive    Able to Return to Prior Arrangements yes       Resource/Environmental Concerns    Resource/Environmental Concerns none    Transportation Concerns none       Transition Planning    Patient/Family Anticipates Transition to home with family    Patient/Family Anticipated Services at Transition none    Transportation Anticipated family or friend will provide       Discharge Needs Assessment    Readmission Within the Last 30 Days no previous admission in last 30 days    Equipment Currently Used at Home none    Anticipated Changes Related to Illness none    Equipment Needed After Discharge none    Provided Post Acute Provider List? N/A    Provided Post Acute Provider Quality & Resource List? N/A               Discharge Plan     Row Name 05/05/22 1517       Plan    Plan home    Patient/Family in Agreement with Plan yes    Plan Comments CM spoke with pt at bedside. Pt resides in [place] with her friend Cathleen. Pt is independent of adls and works at a [employer]. Pt denies use of DME and is not current with home health or outpatient medical services. Pt has 17 steps in her apartment up to bedroom with railings. Pt denies having a living will or legal POA. Pt has received her covid vaccinations.Pt has [facility] health insurance, denies concerns or disruption in coverage.  Pt has prescription drug coverage and denies issues obtaining or affording current  medications. Pt plans to return home and denies needs at this time. Pt will have private transportation home at discharge. CM will continue to follow.    Final Discharge Disposition Code 01 - home or self-care              Continued Care and Services - Admitted Since 5/5/2022    Coordination has not been started for this encounter.          Demographic Summary     Row Name 05/05/22 1515       General Information    Referral Source admission list    Reason for Consult discharge planning    Preferred Language English    General Information Comments PCP- BRAULIO Soto       Contact Information    Permission Granted to Share Info With                Functional Status     Row Name 05/05/22 1515       Functional Status    Usual Activity Tolerance good    Current Activity Tolerance moderate       Functional Status, IADL    Medications independent    Meal Preparation independent    Housekeeping independent    Laundry independent    Shopping independent       Mental Status    General Appearance WDL WDL       Mental Status Summary    Recent Changes in Mental Status/Cognitive Functioning no changes       Employment/    Employment Status employed full-time    Current or Previous Occupation healthcare    Employment/ Comments Pt has [employer] better health insurance, denies concerns or disruption in coverage.  Pt has prescription drug coverage and denies issues obtaining or affording current medications.               Psychosocial    No documentation.                Abuse/Neglect    No documentation.                Legal    No documentation.                Substance Abuse    No documentation.                Patient Forms    No documentation.                   Ami Montano, RN

## 2022-05-05 NOTE — PAYOR COMM NOTE
"Juani Parker RN   Phone [phone]  Fax [phone]    Please review for inpatient  Valery Treviño (27 y.o. Female)             Date of Birth   1995    Social Security Number       Address   [address] [place] [id number]    Home Phone   [phone]    MRN   [id number]       Druze   None    Marital Status   Single                            Admission Date   5/5/22    Admission Type   Elective    Admitting Provider   Junaid Viveros MD    Attending Provider   Junaid Viveros MD    Department, Room/Bed   [facility] 2F, S209/1       Discharge Date       Discharge Disposition       Discharge Destination                               Attending Provider: Junaid Viveros MD    Allergies: Prozac [Fluoxetine], Metformin, Topamax [Topiramate]    Isolation: None   Infection: None   Code Status: CPR   Advance Care Planning Activity    Ht: 165.1 cm (65\")   Wt: 137 kg (303 lb)    Admission Cmt: None   Principal Problem: None                Active Insurance as of 5/5/2022     Primary Coverage     Payor Plan Insurance Group Employer/Plan Group    [facility] [email or web address] [facility] [employer] [facility]      Payor Plan Address Payor Plan Phone Number Payor Plan Fax Number Effective Dates    [address]   3/1/2016 - None Entered    [facility] [id number]       Subscriber Name Subscriber Birth Date Member ID       VALERY TREVIÑO 1995 [id number]                 Emergency Contacts      (Rel.) Home Phone Work Phone Mobile Phone    WHITNEYKAREN (Significant Other) [phone] -- --               History & Physical      Junaid Viveros MD at 05/05/22 0920          H&P reviewed. The patient was examined and there are no changes to the H&P.          Electronically signed by Junaid Viveros MD at 05/05/22 0920   Source Note          [facility] Bariatric Surgery  [address]  [address]  [facility] " "[id number]  [phone]        Patient Name:  Valery Hernandez.  :  1995      Reason for Visit:    weight gain; unable to maintain weight loss.   Evaluate for possible metabolic and bariatric surgery    HPI: Valery Hernandez is a 27 y.o. female who presents today for evaluation, education and consultation regarding metabolic and bariatric surgery (MBS).      Per initial consult with Dr. Viveros 1/10/22: \"The patient returns for final visit prior to metabolic and bariatric surgery specifically the sleeve gastrectomy.  Original intake evaluation Ami Jacobson PA-C dated 2021 reviewed.  She notes the patient's maximum lifetime weight is 305 pounds and that she has history of hypertension PCOS/insulin resistance, migraines joint pain anxiety and depression H. pylori positive antibodies treated recently by her PCP and that stool studies are pending for recheck and has chronic reflux controlled with daily Pepcid.      The patient has had issues with morbid obesity for years and only temporary success with non-surgical methods of weight loss.  The patient is seeking LSG to help with the morbid obesity related conditions of hiatal hernia with GE reflux disease, anxiety and depression, anemia, H. pylori gastritis, hypertension, joint pain, migraine headaches, PCOS, prediabetes, vitamin D deficiency, elevated hemoglobin A1c, elevated ALT, elevated HDL.     26-year-old near super morbidly obese female from [facility].  She is aware at her BMI sleeve gastrectomy may be a first stage procedure.  She says she enjoyed the informed consent discussion.  She says her GI symptoms were better after treatment for H. pylori gastritis but still has \"bad heartburn\" if she does not take her Pepcid.  She says she has never been pregnant.\"       Surgery was postponed due to COVID-19, now ready to proceed. Feeling well with no concerns and eager to proceed.   Patient has been on the liver shrinking preop diet since last week.  " Denies any ASA, NSAIDS, steroids, tramadol, tobacco/ nicotine use/ exposure., herbal supplements, hormones, diet pills.  Not taking her ibuprofen. She has reflux not fully controlled on pepcid.   Denies dysphagia, nausea, vomiting, abdominal pain, pulmonary issues and fevers. Patient has vitamins and protein supplements ready for postop.     Last visit in office weight was 295lb and BMI 49      Past Medical History:   Diagnosis Date   • Anemia    • Anxiety    • Depression    • Elevated ALT measurement    • Elevated HDL    • Elevated hemoglobin A1c    • GERD (gastroesophageal reflux disease)     pepcid daily controls symptoms, h pylori antibodies + with treatment, no prior EGD   • H. pylori infection 4/22/2021   • Hiatal hernia with gastroesophageal reflux     EGD Dr. Viveros 7/19/2021   • Hypertension    • Joint pain     not treated with meds   • Migraine     takes sumatriptan prn   • Polycystic ovary syndrome    • Prediabetes 4/22/2021   • S/P dorothy     acalculous   • Urinary tract infection    • Vitamin D deficiency 4/22/2021     Past Surgical History:   Procedure Laterality Date   • LAPAROSCOPIC CHOLECYSTECTOMY  2016   • TONSILLECTOMY  2003   • WISDOM TOOTH EXTRACTION  2014     Outpatient Medications Marked as Taking for the 4/28/22 encounter (Telemedicine) with Ami Jacobson PA-C   Medication Sig Dispense Refill   • amLODIPine (NORVASC) 10 MG tablet Take 1 tablet by mouth Daily. 90 tablet 3   • baclofen (LIORESAL) 10 MG tablet Take 1 tablet by mouth At Night As Needed for Muscle Spasms. 30 tablet 1   • cetirizine (zyrTEC) 10 MG tablet Take 1 tablet by mouth Every Night. 90 tablet 3   • desvenlafaxine (Pristiq) 100 MG 24 hr tablet Take 1 tablet by mouth Daily. 90 tablet 1   • famotidine (Pepcid) 40 MG tablet Take 1 tablet by mouth 2 (Two) Times a Day As Needed for Indigestion or Heartburn. 180 tablet 3   • fluticasone (Flonase) 50 MCG/ACT nasal spray 2 sprays into the nostril(s) as directed by provider Daily.  18.2 mL 11   • hydrOXYzine pamoate (VISTARIL) 50 MG capsule Take 1 capsule by mouth At Night As Needed for Anxiety (sleep). 90 capsule 3   • metoprolol succinate XL (TOPROL-XL) 100 MG 24 hr tablet Take 1 tablet by mouth Daily. 90 tablet 3   • montelukast (SINGULAIR) 10 MG tablet Take 1 tablet by mouth Every Night. 90 tablet 3   • NON FORMULARY Bariatric Vitamins   Multi  B12  B1  Vit D  Calcium     • SUMAtriptan (IMITREX) 25 MG tablet Take 25 mg by mouth 1 (One) Time As Needed.     • traZODone (DESYREL) 150 MG tablet Take 1 tablet by mouth Every Night. 90 tablet 3       Allergies   Allergen Reactions   • Prozac [Fluoxetine] Myalgia   • Metformin GI Intolerance   • Topamax [Topiramate] Rash     Rash, tingling and numnbess in bilateral feet.        Social History     Socioeconomic History   • Marital status: Single   Tobacco Use   • Smoking status: Never Smoker   • Smokeless tobacco: Never Used   Vaping Use   • Vaping Use: Never used   Substance and Sexual Activity   • Alcohol use: Yes     Comment: social   • Drug use: No   • Sexual activity: Yes     Partners: Female     Birth control/protection: OCP       There were no vitals taken for this visit.    Physical Exam  Constitutional:       Appearance: She is well-developed. She is obese.   HENT:      Head: Normocephalic and atraumatic.   Pulmonary:      Effort: Pulmonary effort is normal.   Neurological:      Mental Status: She is alert and oriented to person, place, and time.   Psychiatric:         Thought Content: Thought content normal.           Assessment:  Valery Hernandez is a 26 y.o. year old female with medically complicated obesity.     Metabolic and bariatric surgery is deemed medically necessary given the following obesity related comorbidities including hiatal hernia with GE reflux disease, anxiety and depression, anemia, H. pylori gastritis, hypertension, joint pain, migraine headaches, PCOS, prediabetes, vitamin D deficiency, elevated hemoglobin A1c,  "elevated ALT, elevated HDL with current Weight: 134 kg (295 lb 8 oz) and Body mass index is 49.17 kg/m²..    ICD-10-CM ICD-9-CM   1. Morbid obesity with BMI of 50.0-59.9, adult ([facility]ly [facility])  E66.01 278.01    Z68.43 V85.43   2. Hiatal hernia with gastroesophageal reflux  K44.9 553.3    K21.9 530.81       Per Dr. Viveros' assessment: \"Patient is aware that surgery is a tool, and that weight loss and improvement in comorbidities is not guaranteed but only seen in the context of appropriate use, follow up and physical activity.     The patient was present for an approximately a 2.5 hour discussion of the purpose of MBS, how MBS is a tool to assist in achieving weight loss goals, the most common complications and how best to avoid them, and the strategies for short and long term weight loss and improvement in comorbidities.  Ample opportunity to discuss questions was available both in group and during the time of individual examination.     I reviewed her Jomar report which is negative.  Labs dated 12/20/2021 showing a normal CBC of note hemoglobin 12.3 normal CMP except for CO2 of 29.2 and an ALT of 36.  Chest x-ray dated 12/20/2021 no active process.  EKG dated 7/12/2021 normal sinus rhythm with short AL and nonspecific T wave abnormality illegible signature MD.  Psychosocial evaluation dated 6/22/2021 Rosalva Mclaughlin, PhD good candidate.  Dietitian evaluation dated 6/22/2021 Kristen gaines RD noting diet contains no fruits or vegetables and marginal in protein with excessive processed food.  EGD dated 7/19/2021 showing a small sliding hiatal hernia no residual changes of H. pylori gastritis Z-line at 37 cm.  I noted at the time that she sought the procedure with Dr. Perez in [place] a few years ago but he recommended the LAP-BAND and she decided to wait and that she did well with weight loss on her own but over COVID put the weight back on and is now interested in pursuing sleeve gastrectomy and that she has a longstanding " "history of heartburn that has improved significantly after being treated for H. pylori positivity noted on intake evaluation no dysphagia.  Pathology of the antrum showed reactive changes negative for H. pylori distal esophageal biopsies showed reactive changes otherwise unremarkable.  Negative H. pylori stool antigen retesting dated 6/4/2021.  Labs dated 4/20/2021 normal TSH elevated insulin 42.4 elevated hemoglobin A1c 5.70.  Serum H. pylori testing on 5/23/2021 showing an elevated H. pylori IgM otherwise unremarkable.  Normal free T4 normal ALT normal vitamin B12 low vitamin D 25 hydroxy elevated white count 11.56 no differential lipid panel normal except for an HDL of 81.  Cardiology clearance dated 7/12/2021 signature illegible.  Cardiology note dated 7/12/2021 Wayne mack MD.  Please see scanned records that I have reviewed and signed off on today.  All of this in addition to the patient's unique history and exam has been taken into consideration in determining their appropriate candidacy for MBS.     Complications  of laparoscopic/possible robotic gastric sleeve were discussed. The patient is well aware of the potential complications of surgery that include but not limited to bleeding, infections, deep venous thrombosis, pulmonary embolism, pulmonary complications such as pneumonia, cardiac events, hernias, small bowel obstruction, damage to the spleen or other organs, bowel injury, disfiguring scars, failure to lose weight, need for additional surgery, conversion to an open procedure, and death. Patient is also aware of complications which apply in this particular procedure that can include but are not limited to a \"leak\" at the staple line which in some instances may require conversion to gastric bypass.     The patient is aware if a hiatal hernia is encountered, it likely will be repaired.  R/B/A Rx to hiatal hernia repair were discussed as outlined in our long consent form.  Briefly risks in addition " "to those for LSG include recurrent hernia, SHANICE, dysphagia, esophageal injury, pneumothorax, injury to the vagus nerves, injury to the thoracic duct, aorta or vena cava.     I discussed avoiding all tobacco products, nicotine,  and second hand smoke at least 2 weeks pre-operatively and 6 weeks post-operatively to minimize the risk of sleeve leak.  This included discussing the importance of avoiding even secondhand smoke as the risk of leak is increased.  Examples discussed:  Avoid going in a house or riding in a car where someone has previously smoked in the last 2 weeks and for 6 weeks postoperatively.  Avoid living in a house where someone smokes (even if it's in a separate room/patio/attached garage, etc.).   Avoid congregating with a group of people who are smoking even if it's outside.  It is OK to be around wood burning fires and barbecue.  I explained that I do not know if marijuana has a same effects but my overall recommendation is to avoid it for 2 weeks prior in 6 weeks after surgery.      Discussed the risks, benefits and alternative therapies at great length as outlined in our extensive consent forms, consent videos, and educational teaching process under the direction of the center's .     A copy of the patient's signed informed consent is on file.     R/B/A Rx discussed to postop anticoagulation incl but not limited to bleeding, drug reaction, venothromboembolic events, etc. and the patient declined.\"    Plan:    Will proceed with laparoscopic sleeve gastrectomy, hiatal hernia repair and EGD. R/b/a rx discussed including but not limited to bleeding, infection, damage to surrounding structures including leak from staple line,  bowel injury, pulm complications, venothromboembolic events, death etc and wishes to proceed.  Continue on pre-op diet as discussed. Avoid ASA, NSAIDs, steroids, tramadol, OCPs, diet pills, tobacco/ nicotine use/ exposure.  Follow up postop as directed. Advised " "having postop vitamins and protein supplements ready for home d/c.  Call with questions/ concerns.      Per Dr. Viveros plan 1/10/22: \"After evaluation today I think the patient is a reasonable candidate for laparoscopic sleeve gastrectomy, hiatal hernia repair, and EGD.  Type and screen.  Once again given her BMI and poor dietary habits noted on dietitian evaluation sleeve gastrectomy may be a first stage procedure.  Other issues include hiatal hernia with GE reflux disease, anxiety and depression, anemia, H. pylori gastritis, hypertension, joint pain, migraine headaches, PCOS, prediabetes, vitamin D deficiency, elevated hemoglobin A1c, elevated ALT, elevated HDL. .\"         Electronically signed by Ami Jacobson PA-C at 22 1035             Ami Jacobson PA-C at 22 1000          [facility]ariat[facility]  [address]  [address]  [facility], [facility] [address]  [phone]        Patient Name:  Valery Hernandez.  :  1995      Reason for Visit:    weight gain; unable to maintain weight loss.   Evaluate for possible metabolic and bariatric surgery    HPI: Valery Hernandez is a 27 y.o. female who presents today for evaluation, education and consultation regarding metabolic and bariatric surgery (MBS).      Per initial consult with Dr. Viveros 1/10/22: \"The patient returns for final visit prior to metabolic and bariatric surgery specifically the sleeve gastrectomy.  Original intake evaluation Ami Jacobson PA-C dated 2021 reviewed.  She notes the patient's maximum lifetime weight is 305 pounds and that she has history of hypertension PCOS/insulin resistance, migraines joint pain anxiety and depression H. pylori positive antibodies treated recently by her PCP and that stool studies are pending for recheck and has chronic reflux controlled with daily Pepcid.      The patient has had issues with morbid obesity for years and only temporary success with non-surgical methods of " "weight loss.  The patient is seeking LSG to help with the morbid obesity related conditions of hiatal hernia with GE reflux disease, anxiety and depression, anemia, H. pylori gastritis, hypertension, joint pain, migraine headaches, PCOS, prediabetes, vitamin D deficiency, elevated hemoglobin A1c, elevated ALT, elevated HDL.     26-year-old near super morbidly obese female from [facility].  She is aware at her BMI sleeve gastrectomy may be a first stage procedure.  She says she enjoyed the informed consent discussion.  She says her GI symptoms were better after treatment for H. pylori gastritis but still has \"bad heartburn\" if she does not take her Pepcid.  She says she has never been pregnant.\"       Surgery was postponed due to COVID-19, now ready to proceed. Feeling well with no concerns and eager to proceed.   Patient has been on the liver shrinking preop diet since last week.  Denies any ASA, NSAIDS, steroids, tramadol, tobacco/ nicotine use/ exposure., herbal supplements, hormones, diet pills.  Not taking her ibuprofen. She has reflux not fully controlled on pepcid.   Denies dysphagia, nausea, vomiting, abdominal pain, pulmonary issues and fevers. Patient has vitamins and protein supplements ready for postop.     Last visit in office weight was 295lb and BMI 49      Past Medical History:   Diagnosis Date   • Anemia    • Anxiety    • Depression    • Elevated ALT measurement    • Elevated HDL    • Elevated hemoglobin A1c    • GERD (gastroesophageal reflux disease)     pepcid daily controls symptoms, h pylori antibodies + with treatment, no prior EGD   • H. pylori infection 4/22/2021   • Hiatal hernia with gastroesophageal reflux     EGD Dr. Viveros 7/19/2021   • Hypertension    • Joint pain     not treated with meds   • Migraine     takes sumatriptan prn   • Polycystic ovary syndrome    • Prediabetes 4/22/2021   • S/P dorohty     acalculous   • Urinary tract infection    • Vitamin D deficiency 4/22/2021 "     Past Surgical History:   Procedure Laterality Date   • LAPAROSCOPIC CHOLECYSTECTOMY  2016   • TONSILLECTOMY  2003   • WISDOM TOOTH EXTRACTION  2014     Outpatient Medications Marked as Taking for the 4/28/22 encounter (Telemedicine) with Ami Jacobson PA-C   Medication Sig Dispense Refill   • amLODIPine (NORVASC) 10 MG tablet Take 1 tablet by mouth Daily. 90 tablet 3   • baclofen (LIORESAL) 10 MG tablet Take 1 tablet by mouth At Night As Needed for Muscle Spasms. 30 tablet 1   • cetirizine (zyrTEC) 10 MG tablet Take 1 tablet by mouth Every Night. 90 tablet 3   • desvenlafaxine (Pristiq) 100 MG 24 hr tablet Take 1 tablet by mouth Daily. 90 tablet 1   • famotidine (Pepcid) 40 MG tablet Take 1 tablet by mouth 2 (Two) Times a Day As Needed for Indigestion or Heartburn. 180 tablet 3   • fluticasone (Flonase) 50 MCG/ACT nasal spray 2 sprays into the nostril(s) as directed by provider Daily. 18.2 mL 11   • hydrOXYzine pamoate (VISTARIL) 50 MG capsule Take 1 capsule by mouth At Night As Needed for Anxiety (sleep). 90 capsule 3   • metoprolol succinate XL (TOPROL-XL) 100 MG 24 hr tablet Take 1 tablet by mouth Daily. 90 tablet 3   • montelukast (SINGULAIR) 10 MG tablet Take 1 tablet by mouth Every Night. 90 tablet 3   • NON FORMULARY Bariatric Vitamins   Multi  B12  B1  Vit D  Calcium     • SUMAtriptan (IMITREX) 25 MG tablet Take 25 mg by mouth 1 (One) Time As Needed.     • traZODone (DESYREL) 150 MG tablet Take 1 tablet by mouth Every Night. 90 tablet 3       Allergies   Allergen Reactions   • Prozac [Fluoxetine] Myalgia   • Metformin GI Intolerance   • Topamax [Topiramate] Rash     Rash, tingling and numnbess in bilateral feet.        Social History     Socioeconomic History   • Marital status: Single   Tobacco Use   • Smoking status: Never Smoker   • Smokeless tobacco: Never Used   Vaping Use   • Vaping Use: Never used   Substance and Sexual Activity   • Alcohol use: Yes     Comment: social   • Drug use: No   •  "Sexual activity: Yes     Partners: Female     Birth control/protection: OCP       There were no vitals taken for this visit.    Physical Exam  Constitutional:       Appearance: She is well-developed. She is obese.   HENT:      Head: Normocephalic and atraumatic.   Pulmonary:      Effort: Pulmonary effort is normal.   Neurological:      Mental Status: She is alert and oriented to person, place, and time.   Psychiatric:         Thought Content: Thought content normal.           Assessment:  Valery Hernandez is a 26 y.o. year old female with medically complicated obesity.     Metabolic and bariatric surgery is deemed medically necessary given the following obesity related comorbidities including hiatal hernia with GE reflux disease, anxiety and depression, anemia, H. pylori gastritis, hypertension, joint pain, migraine headaches, PCOS, prediabetes, vitamin D deficiency, elevated hemoglobin A1c, elevated ALT, elevated HDL with current Weight: 134 kg (295 lb 8 oz) and Body mass index is 49.17 kg/m²..    ICD-10-CM ICD-9-CM   1. Morbid obesity with BMI of 50.0-59.9, adult ([facility]ly [facility])  E66.01 278.01    Z68.43 V85.43   2. Hiatal hernia with gastroesophageal reflux  K44.9 553.3    K21.9 530.81       Per Dr. Viveros' assessment: \"Patient is aware that surgery is a tool, and that weight loss and improvement in comorbidities is not guaranteed but only seen in the context of appropriate use, follow up and physical activity.     The patient was present for an approximately a 2.5 hour discussion of the purpose of MBS, how MBS is a tool to assist in achieving weight loss goals, the most common complications and how best to avoid them, and the strategies for short and long term weight loss and improvement in comorbidities.  Ample opportunity to discuss questions was available both in group and during the time of individual examination.     I reviewed her Jomar report which is negative.  Labs dated 12/20/2021 showing a normal CBC of note " hemoglobin 12.3 normal CMP except for CO2 of 29.2 and an ALT of 36.  Chest x-ray dated 12/20/2021 no active process.  EKG dated 7/12/2021 normal sinus rhythm with short HI and nonspecific T wave abnormality illegible signature MD.  Psychosocial evaluation dated 6/22/2021 Rosalva Mclaughlin, PhD good candidate.  Dietitian evaluation dated 6/22/2021 Kristen gaines RD noting diet contains no fruits or vegetables and marginal in protein with excessive processed food.  EGD dated 7/19/2021 showing a small sliding hiatal hernia no residual changes of H. pylori gastritis Z-line at 37 cm.  I noted at the time that she sought the procedure with Dr. Perez in [place] a few years ago but he recommended the LAP-BAND and she decided to wait and that she did well with weight loss on her own but over COVID put the weight back on and is now interested in pursuing sleeve gastrectomy and that she has a longstanding history of heartburn that has improved significantly after being treated for H. pylori positivity noted on intake evaluation no dysphagia.  Pathology of the antrum showed reactive changes negative for H. pylori distal esophageal biopsies showed reactive changes otherwise unremarkable.  Negative H. pylori stool antigen retesting dated 6/4/2021.  Labs dated 4/20/2021 normal TSH elevated insulin 42.4 elevated hemoglobin A1c 5.70.  Serum H. pylori testing on 5/23/2021 showing an elevated H. pylori IgM otherwise unremarkable.  Normal free T4 normal ALT normal vitamin B12 low vitamin D 25 hydroxy elevated white count 11.56 no differential lipid panel normal except for an HDL of 81.  Cardiology clearance dated 7/12/2021 signature illegible.  Cardiology note dated 7/12/2021 Wayne mack MD.  Please see scanned records that I have reviewed and signed off on today.  All of this in addition to the patient's unique history and exam has been taken into consideration in determining their appropriate candidacy for MBS.     Complications  of  "laparoscopic/possible robotic gastric sleeve were discussed. The patient is well aware of the potential complications of surgery that include but not limited to bleeding, infections, deep venous thrombosis, pulmonary embolism, pulmonary complications such as pneumonia, cardiac events, hernias, small bowel obstruction, damage to the spleen or other organs, bowel injury, disfiguring scars, failure to lose weight, need for additional surgery, conversion to an open procedure, and death. Patient is also aware of complications which apply in this particular procedure that can include but are not limited to a \"leak\" at the staple line which in some instances may require conversion to gastric bypass.     The patient is aware if a hiatal hernia is encountered, it likely will be repaired.  R/B/A Rx to hiatal hernia repair were discussed as outlined in our long consent form.  Briefly risks in addition to those for LSG include recurrent hernia, SHANICE, dysphagia, esophageal injury, pneumothorax, injury to the vagus nerves, injury to the thoracic duct, aorta or vena cava.     I discussed avoiding all tobacco products, nicotine,  and second hand smoke at least 2 weeks pre-operatively and 6 weeks post-operatively to minimize the risk of sleeve leak.  This included discussing the importance of avoiding even secondhand smoke as the risk of leak is increased.  Examples discussed:  Avoid going in a house or riding in a car where someone has previously smoked in the last 2 weeks and for 6 weeks postoperatively.  Avoid living in a house where someone smokes (even if it's in a separate room/patio/attached garage, etc.).   Avoid congregating with a group of people who are smoking even if it's outside.  It is OK to be around wood burning fires and barbecue.  I explained that I do not know if marijuana has a same effects but my overall recommendation is to avoid it for 2 weeks prior in 6 weeks after surgery.      Discussed the risks, benefits " "and alternative therapies at great length as outlined in our extensive consent forms, consent videos, and educational teaching process under the direction of the center's .     A copy of the patient's signed informed consent is on file.     R/B/A Rx discussed to postop anticoagulation incl but not limited to bleeding, drug reaction, venothromboembolic events, etc. and the patient declined.\"    Plan:    Will proceed with laparoscopic sleeve gastrectomy, hiatal hernia repair and EGD. R/b/a rx discussed including but not limited to bleeding, infection, damage to surrounding structures including leak from staple line,  bowel injury, pulm complications, venothromboembolic events, death etc and wishes to proceed.  Continue on pre-op diet as discussed. Avoid ASA, NSAIDs, steroids, tramadol, OCPs, diet pills, tobacco/ nicotine use/ exposure.  Follow up postop as directed. Advised having postop vitamins and protein supplements ready for home d/c.  Call with questions/ concerns.      Per Dr. Viveros plan 1/10/22: \"After evaluation today I think the patient is a reasonable candidate for laparoscopic sleeve gastrectomy, hiatal hernia repair, and EGD.  Type and screen.  Once again given her BMI and poor dietary habits noted on dietitian evaluation sleeve gastrectomy may be a first stage procedure.  Other issues include hiatal hernia with GE reflux disease, anxiety and depression, anemia, H. pylori gastritis, hypertension, joint pain, migraine headaches, PCOS, prediabetes, vitamin D deficiency, elevated hemoglobin A1c, elevated ALT, elevated HDL. .\"         Electronically signed by Ami Jacobson PA-C at 04/28/22 1035       Vital Signs (last day)     Date/Time Temp Temp src Pulse Resp BP Patient Position SpO2    05/05/22 1515 97.7 (36.5) Oral 102 18 128/81 Lying 98    05/05/22 1400 -- -- 85 -- -- -- --    05/05/22 1327 -- -- 103 16 -- -- --    05/05/22 1220 97.8 (36.6) Oral 74 16 124/81 Lying 94    05/05/22 " 1215 97.4 (36.3) Temporal 70 16 132/75 Lying 93    05/05/22 1200 97.6 (36.4) Temporal 68 16 127/76 Lying 94    05/05/22 1150 -- -- 70 -- 122/72 -- 95    05/05/22 1145 97.5 (36.4) Temporal 68 16 128/72 Lying 94    05/05/22 1140 97.3 (36.3) Temporal 79 15 125/75 Lying 95    05/05/22 1135 -- -- 70 -- 127/72 -- 93    05/05/22 1130 -- -- 83 -- 123/72 -- 91    05/05/22 1126 98 (36.7) Temporal 90 16 130/72 Lying 93    05/05/22 0803 98 (36.7) Tympanic 84 16 126/88 Lying 97          Intake & Output (last day)       05/04 0701  05/05 0700 05/05 0701  05/06 0700    I.V. (mL/kg)  1000 (7.3)    Total Intake(mL/kg)  1000 (7.3)    Urine (mL/kg/hr)  0    Blood  0    Total Output  0    Net  +1000          Urine Unmeasured Occurrence  1 x          Facility-Administered Medications as of 5/5/2022   Medication Dose Route Frequency Provider Last Rate Last Admin   • acetaminophen (TYLENOL) tablet 1,000 mg  1,000 mg Oral Q8H Junaid Viveros MD   1,000 mg at 05/05/22 1505    Or   • acetaminophen (TYLENOL) 160 MG/5ML solution 1,000 mg  1,000 mg Oral Q8H Junaid Viveros MD       • [COMPLETED] acetaminophen (TYLENOL) tablet 1,000 mg  1,000 mg Oral Once Junaid Viveros MD   1,000 mg at 05/05/22 0743   • albuterol (PROVENTIL) nebulizer solution 0.083% 2.5 mg/3mL  2.5 mg Nebulization Q6H While Awake - RT Junaid Viveros MD   2.5 mg at 05/05/22 1327   • ALPRAZolam (XANAX) tablet 0.25 mg  0.25 mg Oral Once PRN Junaid Viveros MD       • amisulpride (antiemetic) (BARHEMSYS) injection 10 mg  10 mg Intravenous Once PRN Junaid Viveros MD       • amLODIPine (NORVASC) tablet 10 mg  10 mg Oral Daily Uli Braxton, [facility]       • baclofen (LIORESAL) tablet 10 mg  10 mg Oral Nightly PRN Junaid Viveros MD       • [COMPLETED] ceFAZolin in Sodium Chloride (ANCEF) IVPB solution 3 g  3 g Intravenous Once Junaid Viveros MD   3 g at 05/05/22 1003   • ceFAZolin in Sodium Chloride (ANCEF) IVPB solution 3 g  3 g Intravenous  Q8H Junaid Viveros MD       • cetirizine (zyrTEC) tablet 10 mg  10 mg Oral Nightly Junaid Viveros MD       • [] chlorhexidine (PERIDEX) 0.12 % solution 30 mL  30 mL Mouth/Throat Q5 Min Junaid Viveros MD   30 mL at 22 0744   • [START ON 2022] cyanocobalamin injection 1,000 mcg  1,000 mcg Intramuscular Once Junaid Viveros MD       • desvenlafaxine (PRISTIQ) 24 hr tablet 100 mg  100 mg Oral Daily Uli Braxton, [facility]       • diphenhydrAMINE (BENADRYL) injection 25 mg  25 mg Intravenous Q4H PRN Junaid Viveros MD       • [START ON 2022] Enoxaparin Sodium (LOVENOX) syringe 40 mg  40 mg Subcutaneous Q12H Junaid Viveros MD       • [START ON 2022] ferric gluconate (FERRLECIT)125 MG in sodium chloride 0.9 % 100 mL IVPB  125 mg Intravenous Once PRN Junaid Viveros MD       • [START ON 2022] fluticasone (FLONASE) 50 MCG/ACT nasal spray 2 spray  2 spray Nasal Daily Junaid Viveros MD       • gabapentin (NEURONTIN) capsule 100 mg  100 mg Oral TID Junaid Viveros MD   100 mg at 22 1505    Or   • gabapentin (NEURONTIN) 50 mg/mL solution 100 mg  100 mg Oral TID Junaid Viveros MD       • [COMPLETED] gabapentin (NEURONTIN) capsule 600 mg  600 mg Oral Once Junaid Viveros MD   600 mg at 22 0743   • hydrALAZINE (APRESOLINE) injection 10 mg  10 mg Intravenous Q30 Min PRN Junaid Viveros MD       • HYDROmorphone (DILAUDID) 1 MG/ML injection  - ADS Override Pull            • HYDROmorphone (DILAUDID) injection 1 mg  1 mg Intravenous Q2H PRN Junaid Viveros MD        And   • naloxone (NARCAN) injection 0.1 mg  0.1 mg Intravenous Q5 Min PRN Junaid Viveros MD       • HYDROmorphone (DILAUDID) tablet 2 mg  2 mg Oral Q4H PRN Junaid Viveros MD       • hydrOXYzine (ATARAX) tablet 50 mg  50 mg Oral Nightly PRN Junaid Viveros MD       • [COMPLETED] lactated ringers bolus 1,000 mL  1,000 mL Intravenous Once Junaid Viveros  MD Joseph 2,000 mL/hr at 05/05/22 0744 1,000 mL at 05/05/22 0744   • lactated ringers infusion  150 mL/hr Intravenous Continuous Junaid Viveros MD       • [COMPLETED] lidocaine PF 1% (XYLOCAINE) injection 0.5 mL  0.5 mL Injection Once PRN Raman Villarreal MD   0.5 mL at 05/05/22 0744   • LORazepam (ATIVAN) injection 0.5 mg  0.5 mg Intravenous Q12H PRN Junaid Viveros MD       • LORazepam (ATIVAN) tablet 1 mg  1 mg Oral Q12H PRN Junaid Viveros MD       • metoclopramide (REGLAN) injection 10 mg  10 mg Intravenous Q6H PRN Junaid Viveros MD       • metoprolol succinate XL (TOPROL-XL) 24 hr tablet 100 mg  [facility]       • montelukast (SINGULAIR) tablet 10 mg  10 mg Oral Nightly Junaid Viveros MD       • Morphine sulfate (PF) injection 4 mg  4 mg Intravenous Q2H PRN Junaid Viveros MD        And   • naloxone (NARCAN) injection 0.4 mg  0.4 mg Intravenous Q5 Min PRN Junaid Viveros MD       • ondansetron (ZOFRAN) 2 mg/mL injection  - ADS Override Pull            • ondansetron (ZOFRAN) injection 4 mg  4 mg Intravenous Q6H PRN Junaid Viveros MD        Followed by   • [START ON 5/9/2022] ondansetron (ZOFRAN) tablet 4 mg  4 mg Oral Q6H PRN Junaid Viveros MD       • ondansetron (ZOFRAN) tablet 4 mg  4 mg Oral Q4H PRN Junaid Viveros MD   4 mg at 05/05/22 1514   • oxyCODONE (ROXICODONE) immediate release tablet 5 mg  5 mg Oral Q6H PRN Junaid Viveros MD   5 mg at 05/05/22 1515   • [COMPLETED] pantoprazole (PROTONIX) injection 40 mg  40 mg Intravenous Once Junaid Viveros MD   40 mg at 05/05/22 0751   • [START ON 5/6/2022] pantoprazole (PROTONIX) injection 40 mg  40 mg Intravenous Q AM Junaid Viveros MD       • prochlorperazine (COMPAZINE) tablet 10 mg  10 mg Oral Q6H PRN Junaid Viveros MD       • promethazine (PHENERGAN) tablet 12.5 mg  12.5 mg Oral Q6H PRN Junaid Viveros MD       • scopolamine patch 1 mg/72 hr  1 patch  Transdermal Once Junaid Viveros MD   1 patch at 05/05/22 0741   • simethicone (MYLICON) chewable tablet 80 mg  80 mg Oral 4x Daily PRN Junaid Viveros MD   80 mg at 05/05/22 1338   • [START ON 5/6/2022] sodium chloride 0.45 % with KCl 20 mEq/L infusion  125 mL/hr Intravenous Continuous Junaid Viveros MD       • SUMAtriptan (IMITREX) tablet 25 mg  25 mg Oral Once PRN Junaid Viveros MD       • [START ON 5/6/2022] thiamine (B-1) 100 mg, folic acid 1 mg in sodium chloride 0.9 % 1,000 mL infusion  100 mL/hr Intravenous Once Junaid Viveros MD       • traZODone (DESYREL) tablet 150 mg  150 mg Oral Nightly Junaid Viveros MD            Operative/Procedure Notes (last 24 hours)      Junaid Viveros MD at 05/05/22 1019          Preoperative Diagnosis:   Super morbid obesity (137 kg/BMI 50.42) with Multiple Co-Morbidities, hiatal hernia    Postoperative Diagnosis:   Same    Procedure:                                                      Laparoscopic Sleeve Gastrectomy (85% subtotal vertical gastrectomy, Titan (58B98)                                  Laparoscopic hiatal hernia repair (not paraesophageal)                                                                                                                     EGD                                                                       Surgeon:                                                       NIKKI Viveros MD    Anesthesia:                                                   GETA    EBL:                                                              Minimal    Fluids:                                                           Crystalloid    Specimens:                                                   Subtotal gastrectomy    Drains:                                                           None    Counts:                                                          Correct    Complications:                                                None    Indications:   This is a 27 year-old super morbidly obese female who presents for elective laparoscopic sleeve gastrectomy, hiatal hernia repair, and EGD.  She's undergone our extensive preoperative education teaching and consent process everything's in order and she wishes to proceed.      Operative technique:     The patient was brought to the operating room, and placed supine upon the operating room table. SCD hose were placed, she underwent uneventful general endotracheal anesthesia per the anesthesiology staff, she received IV Ancef and subcutaneous Lovenox, the anesthesiology staff performed a tap block, and her abdomen was prepped and draped with ChloraPrep in a sterile fashion, an Ioban was used as well, a Corey catheter was not placed.    The peritoneal cavity was entered in the high in the left midclavicular line using an 11 mm fascial splitting trocar utilizing an Optiview technique and the abdomen was insufflated to a pressure of 15 mmHg with CO2 gas.  Exploratory laparoscopy revealed no evidence of injury from the entrance technique, a normal-appearing liver, status postcholecystectomy, mild rectus diastases.    Remaining trocars were placed under direct visualization including an additional 11 mm trocar in the upper abdomen to the left of midline, 5 mm trochars in the right and left lateral abdomen, and a 19 mm trocar to the right of the umbilicus.    Through a stab incision in the epigastrium a Jordyn retractor was used to elevate the left lobe of the liver.   The hiatal hernia was not readily view and this was photodocumented.  Beginning approximately two thirds of the way around the greater curvature the stomach, the gastrocolic vessels were divided using the Enseal device.  This proceeded proximally taking down all the short gastric vessels and exposing the left ada.  The hiatal hernia remains subtle from the posterior lateral view as well and once again photos obtained.    The hernia  sac was incised along the base of the left ada and extended up and across the phrenoesophageal membrane.  The pars flaccida was divided, there was not a replaced hepatic vessel.  The hernia sac was incised along the base of the right ada and also extended up and across the phrenoesophageal membrane.  The hernia sac and its contents were dissected out of the mediastinum and reduced below the level of the crura.  There was not a paraesophageal component.  A latex free drain was used temporarily for esophageal retraction.  The GE junction was lengthened to well below the level of the crura by dissecting loose areolar tissue well into the mediastinum.  The crura were dissected to their meeting point inferiorly.  The anterior and posterior vagus nerves were preserved.  The hiatal hernia repair was performed posteriorly using a running nonabsorbable 2-0  V-Loc suture with good result, photodocumentation obtained before and after repair.    Gastrocolic vessels were then divided medially to a few cm proximal to the pylorus.  Adhesions of the posterior stomach to the pancreas and retroperitoneum were divided.  The stomach was marked with a Kitner saturated with a marking pen 1 cm lateral to the angle of His, 3 cm away from the angularis, and 6 cm from the pylorus.  1 mg glucagon IV given.  The previously placed 38 Setswana balloon bougie was advanced into the distal antrum and the balloon insufflated with 15 cc of saline.    The Titan stapler was positioned along the markings with the calibration balloon at the angularis and the stapler was closed.  The calibration bougie was desufflated and removed.  The 85% subtotal vertical sleeve gastrectomy was then performed with a single firing using the Titan stapler (58B98).    The Titan stapler was removed.  The subtotal gastrectomy specimen was retrieved through the 19 mm trocar site incision, inspected, and sent unopened to pathology for permanent section.  It was an average size  specimen.  The sleeve was submerged under saline.  Upper endoscopy was performed, and the endoscope was advanced into the duodenal bulb.  No air bubbles or leak seen, no bleeding at the staple line, no narrowing at the angularis, no pyloric spasm or deformity, no gastritis, no hiatal hernia or Maxwell's esophagus, Z-line approximately 37 cm, and the endoscope was withdrawn.  Endoscopic photodocumentation obtained of the GE junction and widely patent pylorus.  Irrigation fluid was suctioned free.  The sleeve was resting nicely and hemostatic.  The sleeve staple line were treated with a total of 4 cc of aerosolized Tisseel fibrin glue.  Photodocumentation of the sleeve obtained before and after endoscopy.  The Jordyn retractor was removed.  Fascia at the 19 mm trocar site incision was closed with a horizontal mattress 0 Vicryl suture placed with a suture passer under direct visualization and tying the knot extracorporeally.  Remaining trocars were removed under direct visualization, no bleeding noted from their sites.  Skin in each incision was closed using 3-0 Monocryl plus in an interrupted subcuticular stitch followed by skin glue.  The patient tolerated the procedure well without complication, was taken to the recovery room in stable condition.    Electronically signed by Junaid Viveros MD at 05/05/22 1119     Junaid Viveros MD at 05/05/22 1019          GASTRIC SLEEVE LAPAROSCOPIC, HIATAL HERNIA REPAIR LAPAROSCOPIC, ESOPHAGOGASTRODUODENOSCOPY  Progress Note    Valery David  5/5/2022    Pre-op Diagnosis:   Morbid obesity with body mass index of 45.0-49.9 in adult ([facility]) [E66.01, Z68.42]  Hiatal hernia with gastroesophageal reflux [K21.9, K44.9]       Post-Op Diagnosis Codes:     * Morbid obesity with body mass index of 45.0-49.9 in adult ([facility]) [E66.01, Z68.42]     * Hiatal hernia with gastroesophageal reflux [K44.9, K21.9]    Procedure/CPT® Codes:  DE LAP, ROCIO RESTRICT PROC, LONGITUDINAL  GASTRECTOMY [98359]  MI LAP,ESOPHAGOGAST FUNDOPLASTY [53912]  MI ESOPHAGOGASTRODUODENOSCOPY TRANSORAL DIAGNOSTIC [58721]      Procedure(s):  GASTRIC SLEEVE LAPAROSCOPIC  HIATAL HERNIA REPAIR LAPAROSCOPIC  ESOPHAGOGASTRODUODENOSCOPY    Surgeon(s):  Junaid Viveros MD    Anesthesia: General with Block    Staff:   Circulator: Cathleen Lawrence RN  Scrub Person: Daniella Al; Gillian Ambrocio  Nursing Assistant: Christina Bangura CNA         Estimated Blood Loss: minimal    Urine Voided: * No values recorded between 5/5/2022  9:53 AM and 5/5/2022 11:08 AM *    Specimens:                Specimens     ID Source Type Tests Collected By Collected At Frozen?    A Stomach Tissue · TISSUE PATHOLOGY EXAM   Junaid Viveros MD 5/5/22 2588 No    Description: SUB-TOTAL GASTRECTOMY    This specimen was not marked as sent.                Drains: * No LDAs found *    Findings:         Complications: none          Junaid Viveros MD     Date: 5/5/2022  Time: 11:15 EDT        Electronically signed by Junaid Viveros MD at 05/05/22 7534

## 2022-05-05 NOTE — ANESTHESIA PROCEDURE NOTES
Peripheral Block      Patient reassessed immediately prior to procedure    Patient location during procedure: OR  Reason for block: at surgeon's request and post-op pain management  Performed by  Anesthesiologist: Raman Villarreal MD  Preanesthetic Checklist  Completed: patient identified, IV checked, site marked, risks and benefits discussed, surgical consent, monitors and equipment checked, pre-op evaluation and timeout performed  Prep:  Pt Position: supine  Sterile barriers:cap, gloves, sterile barriers and mask  Prep: ChloraPrep  Patient monitoring: blood pressure monitoring, continuous pulse oximetry and EKG  Procedure    Sedation: yes  Performed under: general  Guidance:ultrasound guided  Images:still images obtained, printed/placed on chart    Laterality:Bilateral  Block Type:TAP  Injection Technique:single-shot  Needle Type:short-bevel and echogenic  Needle Gauge:20 G  Resistance on Injection: none    Medications Used: bupivacaine PF (MARCAINE) 0.25 % injection, 60 mL  dexamethasone sodium phosphate injection, 4 mg      Medications  Comment:Block Injection:  LA dose divided between Right and Left block        Post Assessment  Injection Assessment: negative aspiration for heme, incremental injection and no paresthesia on injection  Patient Tolerance:comfortable throughout block  Complications:no  Additional Notes      Under Ultrasound guidance, a BBraun 4inch 360 degree needle was advanced with Normal Saline hydro dissection of tissue.  The Internal Oblique and Transversus Abdominus muscles where visualized.  At or before the aponeurosis of Internal Oblique, local anesthetic spread was visualized in the Transversus Abdominus Plane. Injection was made incrementally with aspiration every 5 mls.  There was no  intravascular injection,  injection pressure was normal, there was no neural injection, and the procedure was completed without difficulty.  Thank You.

## 2022-05-06 ENCOUNTER — READMISSION MANAGEMENT (OUTPATIENT)
Dept: CALL CENTER | Facility: HOSPITAL | Age: 27
End: 2022-05-06

## 2022-05-06 ENCOUNTER — APPOINTMENT (OUTPATIENT)
Dept: GENERAL RADIOLOGY | Facility: HOSPITAL | Age: 27
End: 2022-05-06

## 2022-05-06 VITALS
DIASTOLIC BLOOD PRESSURE: 72 MMHG | WEIGHT: 293 LBS | SYSTOLIC BLOOD PRESSURE: 130 MMHG | TEMPERATURE: 98.2 F | RESPIRATION RATE: 18 BRPM | HEIGHT: 65 IN | BODY MASS INDEX: 48.82 KG/M2 | HEART RATE: 73 BPM | OXYGEN SATURATION: 100 %

## 2022-05-06 LAB
ALBUMIN SERPL-MCNC: 4.1 G/DL (ref 3.5–5.2)
ALBUMIN/GLOB SERPL: 1.8 G/DL
ALP SERPL-CCNC: 77 U/L (ref 39–117)
ALT SERPL W P-5'-P-CCNC: 30 U/L (ref 1–33)
ANION GAP SERPL CALCULATED.3IONS-SCNC: 9 MMOL/L (ref 5–15)
AST SERPL-CCNC: 25 U/L (ref 1–32)
BASOPHILS # BLD AUTO: 0.01 10*3/MM3 (ref 0–0.2)
BASOPHILS NFR BLD AUTO: 0.1 % (ref 0–1.5)
BILIRUB SERPL-MCNC: 0.2 MG/DL (ref 0–1.2)
BUN SERPL-MCNC: 7 MG/DL (ref 6–20)
BUN/CREAT SERPL: 12.5 (ref 7–25)
CALCIUM SPEC-SCNC: 9.1 MG/DL (ref 8.6–10.5)
CHLORIDE SERPL-SCNC: 102 MMOL/L (ref 98–107)
CO2 SERPL-SCNC: 26 MMOL/L (ref 22–29)
CREAT SERPL-MCNC: 0.56 MG/DL (ref 0.57–1)
CYTO UR: NORMAL
DEPRECATED RDW RBC AUTO: 39 FL (ref 37–54)
EGFRCR SERPLBLD CKD-EPI 2021: 130.1 ML/MIN/1.73
EOSINOPHIL # BLD AUTO: 0 10*3/MM3 (ref 0–0.4)
EOSINOPHIL NFR BLD AUTO: 0 % (ref 0.3–6.2)
ERYTHROCYTE [DISTWIDTH] IN BLOOD BY AUTOMATED COUNT: 12.9 % (ref 12.3–15.4)
GLOBULIN UR ELPH-MCNC: 2.3 GM/DL
GLUCOSE SERPL-MCNC: 110 MG/DL (ref 65–99)
HCT VFR BLD AUTO: 34.9 % (ref 34–46.6)
HGB BLD-MCNC: 11.7 G/DL (ref 12–15.9)
IMM GRANULOCYTES # BLD AUTO: 0.02 10*3/MM3 (ref 0–0.05)
IMM GRANULOCYTES NFR BLD AUTO: 0.2 % (ref 0–0.5)
IRON 24H UR-MRATE: 54 MCG/DL (ref 37–145)
LAB AP CASE REPORT: NORMAL
LAB AP CLINICAL INFORMATION: NORMAL
LYMPHOCYTES # BLD AUTO: 1.85 10*3/MM3 (ref 0.7–3.1)
LYMPHOCYTES NFR BLD AUTO: 18.6 % (ref 19.6–45.3)
MCH RBC QN AUTO: 28 PG (ref 26.6–33)
MCHC RBC AUTO-ENTMCNC: 33.5 G/DL (ref 31.5–35.7)
MCV RBC AUTO: 83.5 FL (ref 79–97)
MONOCYTES # BLD AUTO: 0.66 10*3/MM3 (ref 0.1–0.9)
MONOCYTES NFR BLD AUTO: 6.6 % (ref 5–12)
NEUTROPHILS NFR BLD AUTO: 7.4 10*3/MM3 (ref 1.7–7)
NEUTROPHILS NFR BLD AUTO: 74.5 % (ref 42.7–76)
NRBC BLD AUTO-RTO: 0 /100 WBC (ref 0–0.2)
PATH REPORT.FINAL DX SPEC: NORMAL
PATH REPORT.GROSS SPEC: NORMAL
PLATELET # BLD AUTO: 281 10*3/MM3 (ref 140–450)
PMV BLD AUTO: 8.7 FL (ref 6–12)
POTASSIUM SERPL-SCNC: 4.2 MMOL/L (ref 3.5–5.2)
PROT SERPL-MCNC: 6.4 G/DL (ref 6–8.5)
RBC # BLD AUTO: 4.18 10*6/MM3 (ref 3.77–5.28)
SODIUM SERPL-SCNC: 137 MMOL/L (ref 136–145)
WBC NRBC COR # BLD: 9.94 10*3/MM3 (ref 3.4–10.8)

## 2022-05-06 PROCEDURE — 83540 ASSAY OF IRON: CPT | Performed by: SURGERY

## 2022-05-06 PROCEDURE — 0 DIATRIZOATE MEGLUMINE & SODIUM PER 1 ML: Performed by: SURGERY

## 2022-05-06 PROCEDURE — 25010000002 CEFAZOLIN PER 500 MG: Performed by: SURGERY

## 2022-05-06 PROCEDURE — 99024 POSTOP FOLLOW-UP VISIT: CPT | Performed by: SURGERY

## 2022-05-06 PROCEDURE — 25010000002 CYANOCOBALAMIN PER 1000 MCG: Performed by: SURGERY

## 2022-05-06 PROCEDURE — 85025 COMPLETE CBC W/AUTO DIFF WBC: CPT | Performed by: SURGERY

## 2022-05-06 PROCEDURE — 74240 X-RAY XM UPR GI TRC 1CNTRST: CPT

## 2022-05-06 PROCEDURE — 25010000002 THIAMINE PER 100 MG: Performed by: SURGERY

## 2022-05-06 PROCEDURE — 94761 N-INVAS EAR/PLS OXIMETRY MLT: CPT

## 2022-05-06 PROCEDURE — 0 POTASSIUM CHLORIDE PER 2 MEQ: Performed by: SURGERY

## 2022-05-06 PROCEDURE — 94799 UNLISTED PULMONARY SVC/PX: CPT

## 2022-05-06 PROCEDURE — 25010000002 ENOXAPARIN PER 10 MG: Performed by: SURGERY

## 2022-05-06 PROCEDURE — 80053 COMPREHEN METABOLIC PANEL: CPT | Performed by: SURGERY

## 2022-05-06 RX ORDER — OMEPRAZOLE 40 MG/1
40 CAPSULE, DELAYED RELEASE ORAL DAILY
Qty: 60 CAPSULE | Refills: 0 | Status: SHIPPED | OUTPATIENT
Start: 2022-05-06 | End: 2022-07-05

## 2022-05-06 RX ORDER — OXYCODONE HYDROCHLORIDE 5 MG/1
5 TABLET ORAL EVERY 4 HOURS PRN
Qty: 10 TABLET | Refills: 0 | Status: SHIPPED | OUTPATIENT
Start: 2022-05-06 | End: 2022-09-22

## 2022-05-06 RX ORDER — ONDANSETRON 4 MG/1
4 TABLET, FILM COATED ORAL EVERY 4 HOURS PRN
Qty: 8 TABLET | Refills: 0 | Status: SHIPPED | OUTPATIENT
Start: 2022-05-06 | End: 2022-09-22

## 2022-05-06 RX ADMIN — CYANOCOBALAMIN 1000 MCG: 1000 INJECTION, SOLUTION INTRAMUSCULAR; SUBCUTANEOUS at 09:43

## 2022-05-06 RX ADMIN — CEFAZOLIN 3 G: 10 INJECTION, POWDER, FOR SOLUTION INTRAVENOUS at 02:12

## 2022-05-06 RX ADMIN — FLUTICASONE PROPIONATE 2 SPRAY: 50 SPRAY, METERED NASAL at 09:42

## 2022-05-06 RX ADMIN — THIAMINE HYDROCHLORIDE 100 ML/HR: 100 INJECTION, SOLUTION INTRAMUSCULAR; INTRAVENOUS at 04:12

## 2022-05-06 RX ADMIN — GABAPENTIN 100 MG: 100 CAPSULE ORAL at 09:43

## 2022-05-06 RX ADMIN — ENOXAPARIN SODIUM 40 MG: 40 INJECTION SUBCUTANEOUS at 09:42

## 2022-05-06 RX ADMIN — ALBUTEROL SULFATE 2.5 MG: 2.5 SOLUTION RESPIRATORY (INHALATION) at 07:20

## 2022-05-06 RX ADMIN — POTASSIUM CHLORIDE AND SODIUM CHLORIDE 125 ML/HR: 450; 150 INJECTION, SOLUTION INTRAVENOUS at 09:53

## 2022-05-06 RX ADMIN — PANTOPRAZOLE SODIUM 40 MG: 40 INJECTION, POWDER, FOR SOLUTION INTRAVENOUS at 06:06

## 2022-05-06 RX ADMIN — ACETAMINOPHEN 1000 MG: 500 TABLET ORAL at 06:06

## 2022-05-06 NOTE — PROGRESS NOTES
"Cc: POD#1  LSG/HHR  \"feel ok\"    She is alone in the room.  She says she feels well and would like to go home today if possible.  Ambulating and voiding well.  No pulmonary complaints, spirometer 2000.  No bowel movement or flatus.  Tolerating liquids without nausea or vomiting.  She says she has no nausea but is okay with a prescription for discharge in case.  No fever pulse 91 respirations 18 blood pressure 130/72 saturation 100% UO 3225 - NR she is in no apparent distress.  Lungs clear to auscultation.  Heart tachycardic.  Abdomen soft, nontender, nondistended, bowel sounds hypoactive.  Wounds look okay.  CMP normal except glucose of 110 creatinine of 0.56 iron is 54 white count normal at 9.94 with 75 segs 19 lymphs 7 monocytes no bands H&H 11.7 and 34.9.  Hemoglobin A1c normal 5.40.  Upper GI images reviewed, no leak or obstruction appreciated, report pending    Impression: Postop day #1 laparoscopic sleeve gastrectomy and hiatal hernia repair doing well clinically.  Anemia without evidence of iron deficiency or bleeding on Lovenox.  Hyperglycemia with normal hemoglobin A1c.    Plan: Discharge home.  Discharge instructions discussed.  See orders  "

## 2022-05-06 NOTE — OUTREACH NOTE
Prep Survey    Flowsheet Row Responses   [facility] facility patient discharged from? [place]   Is LACE score < 7 ? Yes   Emergency Room discharge w/ pulse ox? No   Eligibility [facility]   Date of Admission 05/05/22   Date of Discharge 05/06/22   Discharge Disposition Home or Self Care   Discharge diagnosis GASTRECTOMY     Does the patient have one of the following disease processes/diagnoses(primary or secondary)? General Surgery   Does the patient have Home health ordered? No   Is there a DME ordered? No   Prep survey completed? Yes          SEGUN DEL RIO - Registered Nurse

## 2022-05-06 NOTE — PLAN OF CARE
Goal Outcome Evaluation:  Plan of Care Reviewed With: patient        Progress: improving    HR has improved during the night. No c/o nausea. Ambulated in moran and to the bathroom several times overnight. Lap sites glued and ALMAS. No drainage noted. No concerns voiced at this time

## 2022-05-06 NOTE — CASE MANAGEMENT/SOCIAL WORK
Continued Stay Note  [facility]     Patient Name: Valery Hernandez  MRN: [id number]  Today's Date: 5/6/2022    Admit Date: 5/5/2022     Discharge Plan     Row Name 05/06/22 0907       Plan    Plan Home    Patient/Family in Agreement with Plan yes    Plan Comments Spoke with patient at bedside. Plan is home with friend. Patient denies any discharge needs at this time. CM will continue to follow.               Discharge Codes    No documentation.                     Bry Calderon RN

## 2022-05-06 NOTE — CASE MANAGEMENT/SOCIAL WORK
Case Management Discharge Note      Final Note: Plan is home with friend. Friend will transport by car. Patient denies any discharge needs.    Provided Post Acute Provider List?: N/A  Provided Post Acute Provider Quality & Resource List?: N/A    Selected Continued Care - Admitted Since 5/5/2022     Destination    No services have been selected for the patient.              Durable Medical Equipment    No services have been selected for the patient.              Dialysis/Infusion    No services have been selected for the patient.              Home Medical Care    No services have been selected for the patient.              Therapy    No services have been selected for the patient.              Community Resources    No services have been selected for the patient.              Community & DME    No services have been selected for the patient.                       Final Discharge Disposition Code: 01 - home or self-care

## 2022-05-09 ENCOUNTER — TRANSITIONAL CARE MANAGEMENT TELEPHONE ENCOUNTER (OUTPATIENT)
Dept: CALL CENTER | Facility: HOSPITAL | Age: 27
End: 2022-05-09

## 2022-05-09 NOTE — OUTREACH NOTE
Call Center TCM Note    Flowsheet Row Responses   [facility] patient discharged from? [place]   Does the patient have one of the following disease processes/diagnoses(primary or secondary)? General Surgery   TCM attempt successful? Yes   Call start time 0840   Call end time 0845   Discharge diagnosis GASTRECTOMY     Is patient permission given to speak with other caregiver? No   Meds reviewed with patient/caregiver? Yes   Does the patient have all medications related to this admission filled (includes all antibiotics, pain medications, etc.) Yes   Is the patient taking all medications as directed (includes completed medication regime)? Yes   Does the patient have a follow up appointment scheduled with their surgeon? Yes  [5/12]   Has the patient kept scheduled appointments due by today? N/A   Comments Hospital Follow Up with Kellie Rush, APRN Wednesday May 11, 2022 8:30 AM   Has home health visited the patient within 72 hours of discharge? N/A   Psychosocial issues? No   Did the patient receive a copy of their discharge instructions? Yes   Nursing interventions Reviewed instructions with patient   What is the patient's perception of their health status since discharge? Improving   Nursing interventions Nurse provided patient education   Is the patient/caregiver able to teach back signs and symptoms of incisional infection? Increased redness, swelling or pain at the incisonal site, Increased drainage or bleeding, Pus or odor from incision, Fever   Is the patient/caregiver able to teach back steps to recovery at home? Set small, achievable goals for return to baseline health, Rest and rebuild strength, gradually increase activity  [Follow bariatric diet instruction for intake. ]   If the patient is a current smoker, are they able to teach back resources for cessation? Not a smoker   Is the patient/caregiver able to teach back the hierarchy of who to call/visit for symptoms/problems? PCP, Specialist, Home  health nurse, Urgent Care, ED, 911 Yes   TCM call completed? Yes   Wrap up additional comments Patient denies any questions or needs today.           Madina Callahan RN    5/9/2022, 08:45 EDT

## 2022-05-12 ENCOUNTER — OFFICE VISIT (OUTPATIENT)
Dept: BARIATRICS/WEIGHT MGMT | Facility: CLINIC | Age: 27
End: 2022-05-12

## 2022-05-12 VITALS
SYSTOLIC BLOOD PRESSURE: 110 MMHG | HEIGHT: 65 IN | WEIGHT: 281 LBS | HEART RATE: 118 BPM | TEMPERATURE: 98.1 F | BODY MASS INDEX: 46.82 KG/M2 | OXYGEN SATURATION: 98 % | DIASTOLIC BLOOD PRESSURE: 78 MMHG

## 2022-05-12 DIAGNOSIS — Z98.84 STATUS POST BARIATRIC SURGERY: Primary | ICD-10-CM

## 2022-05-12 DIAGNOSIS — E66.01 OBESITY, CLASS III, BMI 40-49.9 (MORBID OBESITY): ICD-10-CM

## 2022-05-12 PROCEDURE — 99024 POSTOP FOLLOW-UP VISIT: CPT | Performed by: PHYSICIAN ASSISTANT

## 2022-05-12 NOTE — PROGRESS NOTES
[facility]ariatric Surgery  [address]  [address]  [facility] [id number] [phone]      Patient Name:  Valery Hernandez.  :  1995      Reason for Visit:  POD #7    HPI:  Valery Hernandez is a 27 y.o. female s/p LSG/ HHR by  on 22    D/c home POD#1    Doing well.  Has some nausea initial couple days, not using antiemetics and has resolved.  minmal abd pain at R periumbilical incision. Urine is clear. Using IS to 2500 first few days.  No issues/concerns.  Denies dysphagia, reflux, vomiting, abdominal pain, pulmonary issues and fevers.  Tolerating diet progression - on stage 1.  Getting 50g prot/day.  Drinking 64+ fluid oz/day.  Taking MVI, B12, B1, Calcium, Vit D, iron and Vit C.  On Omeprazole .  Holding ASA , NSAIDs , Tramadol, Hormones, Diuretics , Steroids and Immunologics and tobacco use/ exposure.  Ambulating.On BB for tachycardia.      Presurgery weight: 295 pounds.  Today's weight is 127 kg (281 lb) pounds, today's  Body mass index is 46.76 kg/m²., and@ weight loss since surgery is 14 pounds.       Final Diagnosis   Stomach, subtotal gastrectomy:  Chronic gastritis  Negative for metaplasia, dysplasia or malignancy         Past Medical History:   Diagnosis Date   • Anemia    • Anxiety    • Depression    • Elevated ALT measurement    • Elevated HDL    • Elevated hemoglobin A1c    • GERD (gastroesophageal reflux disease)     pepcid daily controls symptoms, h pylori antibodies + with treatment, no prior EGD   • H. pylori infection 2021   • Hiatal hernia with gastroesophageal reflux     EGD Dr. Viveros 2021   • Hypertension    • Joint pain     not treated with meds   • Migraine     takes sumatriptan prn   • Polycystic ovary syndrome    • Prediabetes 2021   • S/P dorothy     acalculous   • Urinary tract infection    • Vitamin D deficiency 2021   • Wears glasses      Past Surgical History:   Procedure Laterality Date   • ENDOSCOPY     • ENDOSCOPY N/A  5/5/2022    Procedure: ESOPHAGOGASTRODUODENOSCOPY;  Surgeon: Junaid Viveros MD;  Location:  [facility];  Service: Bariatric;  Laterality: N/A;   • GASTRECTOMY N/A 5/5/2022    Procedure: GASTRECTOMY LAPAROSCOPIC;  Surgeon: Junaid Viveros MD;  Location:  [facility];  Service: Bariatric;  Laterality: N/A;   • GASTRIC SLEEVE LAPAROSCOPIC     • HIATAL HERNIA REPAIR N/A 5/5/2022    Procedure: HIATAL HERNIA REPAIR LAPAROSCOPIC;  Surgeon: Junaid Viveros MD;  Location:  [facility];  Service: Bariatric;  Laterality: N/A;   • LAPAROSCOPIC CHOLECYSTECTOMY  2016   • TONSILLECTOMY  2003   • WISDOM TOOTH EXTRACTION  2014     Outpatient Medications Marked as Taking for the 5/12/22 encounter (Office Visit) with Ami Jacobson PA-C   Medication Sig Dispense Refill   • amLODIPine (NORVASC) 10 MG tablet Take 1 tablet by mouth Daily. 90 tablet 3   • baclofen (LIORESAL) 10 MG tablet Take 1 tablet by mouth At Night As Needed for Muscle Spasms. 30 tablet 1   • cetirizine (zyrTEC) 10 MG tablet Take 1 tablet by mouth Every Night. 90 tablet 3   • desvenlafaxine (Pristiq) 100 MG 24 hr tablet Take 1 tablet by mouth Daily. 90 tablet 1   • famotidine (Pepcid) 40 MG tablet Take 1 tablet by mouth 2 (Two) Times a Day As Needed for Indigestion or Heartburn. 180 tablet 3   • fluticasone (Flonase) 50 MCG/ACT nasal spray 2 sprays into the nostril(s) as directed by provider Daily. 18.2 mL 11   • hydrOXYzine pamoate (VISTARIL) 50 MG capsule Take 1 capsule by mouth At Night As Needed for Anxiety (sleep). 90 capsule 3   • metoprolol succinate XL (TOPROL-XL) 100 MG 24 hr tablet Take 1 tablet by mouth Daily. 90 tablet 3   • montelukast (SINGULAIR) 10 MG tablet Take 1 tablet by mouth Every Night. 90 tablet 3   • NON FORMULARY Bariatric Vitamins   Multi  B12  B1  Vit D  Calcium     • omeprazole (priLOSEC) 40 MG capsule Take 1 capsule by mouth Daily for 60 days. 60 capsule 0   • SUMAtriptan (IMITREX) 25 MG tablet Take 25 mg by mouth 1 (One) Time  "As Needed for Migraine.     • traZODone (DESYREL) 150 MG tablet Take 1 tablet by mouth Every Night. 90 tablet 3     Allergies   Allergen Reactions   • Prozac [Fluoxetine] Myalgia   • Metformin GI Intolerance   • Topamax [Topiramate] Rash     Rash, tingling and numnbess in bilateral feet.        Social History     Socioeconomic History   • Marital status: Single   Tobacco Use   • Smoking status: Never Smoker   • Smokeless tobacco: Never Used   Vaping Use   • Vaping Use: Never used   Substance and Sexual Activity   • Alcohol use: Yes     Comment: social   • Drug use: No   • Sexual activity: Yes     Partners: Female     Birth control/protection: OCP       /78   Pulse 118   Temp 98.1 °F (36.7 °C) (Temporal)   Ht 165.1 cm (65\")   Wt 127 kg (281 lb)   LMP 04/14/2022 (Approximate)   SpO2 98%   BMI 46.76 kg/m²   Physical Exam  Constitutional:       Appearance: She is well-developed. She is obese.   HENT:      Head: Normocephalic and atraumatic.      Comments: mask  Cardiovascular:      Rate and Rhythm: Regular rhythm. Tachycardia present.   Pulmonary:      Effort: Pulmonary effort is normal.      Breath sounds: Normal breath sounds.   Abdominal:      General: Bowel sounds are normal.      Palpations: Abdomen is soft.      Comments: Incisions healing well   Skin:     General: Skin is warm and dry.   Neurological:      Mental Status: She is alert.   Psychiatric:         Mood and Affect: Mood normal.         Behavior: Behavior normal.         Thought Content: Thought content normal.         Judgment: Judgment normal.           Assessment:   POD #7 s/p LSG/ HHR by  on 5/5/22    ICD-10-CM ICD-9-CM   1. Status post bariatric surgery  Z98.84 V45.86   2. Obesity, Class III, BMI 40-49.9 (morbid obesity) ([facility])  E66.01 278.01           Plan:  Doing well.   Continue to advance diet per manual.  Increase protein intake to 100g/day.  Increase exercise/activity as tolerated.  Reviewed lifting restrictions, nothing >25 " lbs x 2 more weeks.  Continue vitamins.  Continue PPI.  Continue to avoid ASA/NSAIDs/tramadol/tobacco x 6 weeks postop, steroids x 8 weeks postop.  Call w/ problems/concerns.    Class 3 Severe Obesity (BMI >=40). Obesity-related health conditions include the following: as above. Obesity is improving with treatment. BMI is is above average; BMI management plan is completed. We discussed low calorie, low carb based diet program, portion control and increasing exercise.        The patient was instructed to follow up in 3 weeks, sooner if needed.

## 2022-06-06 ENCOUNTER — OFFICE VISIT (OUTPATIENT)
Dept: BARIATRICS/WEIGHT MGMT | Facility: CLINIC | Age: 27
End: 2022-06-06

## 2022-06-06 VITALS
OXYGEN SATURATION: 97 % | HEIGHT: 65 IN | WEIGHT: 277 LBS | RESPIRATION RATE: 18 BRPM | BODY MASS INDEX: 46.15 KG/M2 | DIASTOLIC BLOOD PRESSURE: 70 MMHG | SYSTOLIC BLOOD PRESSURE: 136 MMHG | HEART RATE: 86 BPM | TEMPERATURE: 97.8 F

## 2022-06-06 DIAGNOSIS — R53.83 FATIGUE, UNSPECIFIED TYPE: ICD-10-CM

## 2022-06-06 DIAGNOSIS — Z90.3 POSTGASTRECTOMY MALABSORPTION: ICD-10-CM

## 2022-06-06 DIAGNOSIS — Z13.0 SCREENING, IRON DEFICIENCY ANEMIA: ICD-10-CM

## 2022-06-06 DIAGNOSIS — E66.01 OBESITY, CLASS III, BMI 40-49.9 (MORBID OBESITY): Primary | ICD-10-CM

## 2022-06-06 DIAGNOSIS — Z13.21 MALNUTRITION SCREEN: ICD-10-CM

## 2022-06-06 DIAGNOSIS — K91.2 POSTGASTRECTOMY MALABSORPTION: ICD-10-CM

## 2022-06-06 DIAGNOSIS — E55.9 HYPOVITAMINOSIS D: ICD-10-CM

## 2022-06-06 DIAGNOSIS — Z98.84 STATUS POST BARIATRIC SURGERY: ICD-10-CM

## 2022-06-06 PROCEDURE — 99024 POSTOP FOLLOW-UP VISIT: CPT | Performed by: PHYSICIAN ASSISTANT

## 2022-06-06 NOTE — PROGRESS NOTES
[facility] Bariatric Surgery  [address]  [address]  [facility] [id number] [phone]      Patient Name:  Valery Hernandez.  :  1995      Reason for Visit:  1 month postop    HPI:  Valery Hernandez is a 27 y.o. female s/p LSG/ HHR by  on 22    Doing well now. Says she had a rough first couple weeks with nausea, not tolerating things well, but has been doing much better the last week or so and is hopeful.  No current issues/concerns. Denies dysphagia, reflux, vomiting, abdominal pain, pulmonary issues and fevers.  Tolerating diet progression - on stage 5.  Getting 60-80g prot/day. Eating 2 times a day and Protein shake in morning.   Drinking <64oz fluid oz/day, still working on improving fluids.  Taking MVI, B12, B1, Calcium, Vit D, iron and Vit C.  On Omeprazole .  Still holding ASA , NSAIDs , Tramadol, Hormones, Diuretics , Steroids and Immunologics.  Active, wants to go back to gym.     Presurgery weight:  295 pounds. Today's weight is 126 kg (277 lb) pounds, today's Body mass index is 46.1 kg/m²., and@ weight loss since surgery is 18 pounds.       Past Medical History:   Diagnosis Date   • Anemia    • Anxiety    • Depression    • Elevated ALT measurement    • Elevated HDL    • Elevated hemoglobin A1c    • GERD (gastroesophageal reflux disease)     pepcid daily controls symptoms, h pylori antibodies + with treatment, no prior EGD   • H. pylori infection 2021   • Hiatal hernia with gastroesophageal reflux     EGD Dr. Viveros 2021   • Hypertension    • Joint pain     not treated with meds   • Migraine     takes sumatriptan prn   • Polycystic ovary syndrome    • Prediabetes 2021   • S/P dorothy     acalculous   • Urinary tract infection    • Vitamin D deficiency 2021   • Wears glasses      Past Surgical History:   Procedure Laterality Date   • ENDOSCOPY     • ENDOSCOPY N/A 2022    Procedure: ESOPHAGOGASTRODUODENOSCOPY;  Surgeon: Junaid Viveros,  MD;  Location:  [facility];  Service: Bariatric;  Laterality: N/A;   • GASTRECTOMY N/A 5/5/2022    Procedure: GASTRECTOMY LAPAROSCOPIC;  Surgeon: Junaid Viveros MD;  Location:  [facility];  Service: Bariatric;  Laterality: N/A;   • GASTRIC SLEEVE LAPAROSCOPIC     • HIATAL HERNIA REPAIR N/A 5/5/2022    Procedure: HIATAL HERNIA REPAIR LAPAROSCOPIC;  Surgeon: Junaid Viveros MD;  Location:  [facility];  Service: Bariatric;  Laterality: N/A;   • LAPAROSCOPIC CHOLECYSTECTOMY  2016   • TONSILLECTOMY  2003   • WISDOM TOOTH EXTRACTION  2014     Outpatient Medications Marked as Taking for the 6/6/22 encounter (Office Visit) with Ami Jacobson PA-C   Medication Sig Dispense Refill   • amLODIPine (NORVASC) 10 MG tablet Take 1 tablet by mouth Daily. 90 tablet 3   • baclofen (LIORESAL) 10 MG tablet Take 1 tablet by mouth At Night As Needed for Muscle Spasms. 30 tablet 1   • cetirizine (zyrTEC) 10 MG tablet Take 1 tablet by mouth Every Night. 90 tablet 3   • desvenlafaxine (Pristiq) 100 MG 24 hr tablet Take 1 tablet by mouth Daily. 90 tablet 1   • fluticasone (Flonase) 50 MCG/ACT nasal spray 2 sprays into the nostril(s) as directed by provider Daily. 18.2 mL 11   • hydrOXYzine pamoate (VISTARIL) 50 MG capsule Take 1 capsule by mouth At Night As Needed for Anxiety (sleep). 90 capsule 3   • metoprolol succinate XL (TOPROL-XL) 100 MG 24 hr tablet Take 1 tablet by mouth Daily. 90 tablet 3   • montelukast (SINGULAIR) 10 MG tablet Take 1 tablet by mouth Every Night. 90 tablet 3   • NON FORMULARY Bariatric Vitamins   Multi  B12  B1  Vit D  Calcium     • omeprazole (priLOSEC) 40 MG capsule Take 1 capsule by mouth Daily for 60 days. 60 capsule 0   • SUMAtriptan (IMITREX) 25 MG tablet Take 25 mg by mouth 1 (One) Time As Needed for Migraine.     • traZODone (DESYREL) 150 MG tablet Take 1 tablet by mouth Every Night. 90 tablet 3     Allergies   Allergen Reactions   • Prozac [Fluoxetine] Myalgia   • Metformin GI Intolerance   •  "Topamax [Topiramate] Rash     Rash, tingling and numnbess in bilateral feet.        Social History     Socioeconomic History   • Marital status: Single   Tobacco Use   • Smoking status: Never Smoker   • Smokeless tobacco: Never Used   Vaping Use   • Vaping Use: Never used   Substance and Sexual Activity   • Alcohol use: Yes     Comment: social   • Drug use: No   • Sexual activity: Yes     Partners: Female     Birth control/protection: OCP       /70 (BP Location: Left arm, Patient Position: Sitting, Cuff Size: Adult)   Pulse 86   Temp 97.8 °F (36.6 °C) (Infrared)   Resp 18   Ht 165.1 cm (65\")   Wt 126 kg (277 lb)   SpO2 97%   BMI 46.10 kg/m²     Physical Exam  Constitutional:       Appearance: She is well-developed.   HENT:      Head: Normocephalic and atraumatic.   Cardiovascular:      Rate and Rhythm: Normal rate and regular rhythm.   Pulmonary:      Effort: Pulmonary effort is normal.      Breath sounds: Normal breath sounds.   Abdominal:      General: Bowel sounds are normal.      Palpations: Abdomen is soft.      Comments: Incisions healing well   Skin:     General: Skin is warm and dry.   Neurological:      Mental Status: She is alert.   Psychiatric:         Behavior: Behavior normal.           Assessment:  1 month s/p LSG/ HHR by  on 5/5/22    ICD-10-CM ICD-9-CM   1. Obesity, Class III, BMI 40-49.9 (morbid obesity) ([facility])  E66.01 278.01   2. Fatigue, unspecified type  R53.83 780.79   3. Status post bariatric surgery  Z98.84 V45.86   4. Hypovitaminosis D  E55.9 268.9   5. Malnutrition screen  Z13.21 V77.2   6. Postgastrectomy malabsorption  K91.2 579.3    Z90.3    7. Screening, iron deficiency anemia  Z13.0 V78.0         Plan:  Doing well.  Advised letting me know if she doesn't continue to improve. Continue to advance diet per manual.  Continue protein 70-100g/day.  Encouraged good food choices - high protein, low carb.  Continue fluids 64oz per day. Continue routine exercise, lifting " restrictions lifted.  Continue PPI. Continue to avoid NSAIDS, ASA, tramadol, tobacco x 6 weeks postop, steroids x 8 weeks postop.  Routine bariatric labs ordered.  Continue vitamins w/ adjustments pending lab results.  Call w/ problems/concerns.    Class 3 Severe Obesity (BMI >=40). Obesity-related health conditions include the following: as above. Obesity is as above. BMI is is above average; BMI management plan is completed. We discussed low calorie, low carb based diet program, portion control and increasing exercise.        The patient was instructed to follow up in 2 months, sooner if needed.

## 2022-07-12 ENCOUNTER — TELEPHONE (OUTPATIENT)
Dept: BARIATRICS/WEIGHT MGMT | Facility: CLINIC | Age: 27
End: 2022-07-12

## 2022-07-12 NOTE — TELEPHONE ENCOUNTER
Received a VM from pt stating that this is day 3 and she is unable to keep any food or liquid down. She is wondering if she should go to the ED, stated she went to [facility] and they told her she had a UTI but she is unable to keep those down as well.

## 2022-09-22 ENCOUNTER — OFFICE VISIT (OUTPATIENT)
Dept: INTERNAL MEDICINE | Facility: CLINIC | Age: 27
End: 2022-09-22

## 2022-09-22 VITALS
TEMPERATURE: 98.4 F | DIASTOLIC BLOOD PRESSURE: 88 MMHG | HEIGHT: 65 IN | OXYGEN SATURATION: 98 % | WEIGHT: 253.5 LBS | HEART RATE: 75 BPM | SYSTOLIC BLOOD PRESSURE: 138 MMHG | BODY MASS INDEX: 42.24 KG/M2

## 2022-09-22 DIAGNOSIS — K21.9 GASTROESOPHAGEAL REFLUX DISEASE, UNSPECIFIED WHETHER ESOPHAGITIS PRESENT: Primary | ICD-10-CM

## 2022-09-22 DIAGNOSIS — E28.2 PCOS (POLYCYSTIC OVARIAN SYNDROME): ICD-10-CM

## 2022-09-22 DIAGNOSIS — Z30.09 BIRTH CONTROL COUNSELING: ICD-10-CM

## 2022-09-22 LAB
B-HCG UR QL: NEGATIVE
EXPIRATION DATE: NORMAL
INTERNAL NEGATIVE CONTROL: NEGATIVE
INTERNAL POSITIVE CONTROL: POSITIVE
Lab: NORMAL

## 2022-09-22 PROCEDURE — 99214 OFFICE O/P EST MOD 30 MIN: CPT | Performed by: NURSE PRACTITIONER

## 2022-09-22 PROCEDURE — 81025 URINE PREGNANCY TEST: CPT | Performed by: NURSE PRACTITIONER

## 2022-09-22 RX ORDER — NORGESTIMATE AND ETHINYL ESTRADIOL 7DAYSX3 LO
1 KIT ORAL DAILY
Qty: 28 TABLET | Refills: 12 | Status: SHIPPED | OUTPATIENT
Start: 2022-09-22 | End: 2022-12-20 | Stop reason: ALTCHOICE

## 2022-09-22 RX ORDER — OMEPRAZOLE 20 MG/1
20 CAPSULE, DELAYED RELEASE ORAL DAILY PRN
Qty: 30 CAPSULE | Refills: 0 | Status: SHIPPED | OUTPATIENT
Start: 2022-09-22

## 2022-09-22 NOTE — PROGRESS NOTES
"Chief Complaint   Patient presents with   • Med Refill     Discuss B/C, omeprazole, other meds.     Subjective   Valery Hernandez is a 27 y.o. female.     History of Present Illness     Patient presents for medication refills.  She has been off her birth control for a while and would like to restart it.  She use to take Tri-Lo-Sprintec.  Has PCOS and irregular periods.  No personal or family history of clotting disorders, non-smoker.  She is up-to-date on her Pap smear.  She has GERD which is uncontrolled as of lately.  She takes famotidine 40 BID.  Wanting to discuss a prescription for omeprazole to take as needed for breakthrough heartburn.  Due annual physical.     The following portions of the patient's history were reviewed and updated as appropriate: allergies, current medications, past family history, past medical history, past social history, past surgical history and problem list.    Review of Systems   Gastrointestinal:        Heartburn   All other systems reviewed and are negative.      Objective   /88   Pulse 75   Temp 98.4 °F (36.9 °C) (Temporal)   Ht 165.1 cm (65\")   Wt 115 kg (253 lb 8 oz)   LMP 09/09/2022 (Exact Date)   SpO2 98%   BMI 42.18 kg/m²   Body mass index is 42.18 kg/m².  Physical Exam  Vitals and nursing note reviewed.   Constitutional:       Appearance: Normal appearance.   HENT:      Head: Normocephalic and atraumatic.   Eyes:      Extraocular Movements: Extraocular movements intact.   Cardiovascular:      Rate and Rhythm: Normal rate and regular rhythm.   Pulmonary:      Effort: Pulmonary effort is normal.      Breath sounds: Normal breath sounds.   Musculoskeletal:         General: Normal range of motion.      Cervical back: Normal range of motion.   Skin:     General: Skin is dry.   Neurological:      General: No focal deficit present.      Mental Status: She is alert and oriented to person, place, and time.   Psychiatric:         Mood and Affect: Mood normal.         " Behavior: Behavior normal.         Thought Content: Thought content normal.         Judgment: Judgment normal.         Recent Labs:  Results for orders placed or performed in visit on 09/22/22   POCT pregnancy, urine    Specimen: Urine   Result Value Ref Range    HCG, Urine, QL Negative Negative    Lot Number [id number]     Internal Positive Control Positive Positive, Passed    Internal Negative Control Negative Negative, Passed    Expiration Date 10,628,211        Assessment & Plan   Valery Hernandez is here today and the following problems have been addressed:      Diagnoses and all orders for this visit:    1. Gastroesophageal reflux disease, unspecified whether esophagitis present (Primary)  -     omeprazole (priLOSEC) 20 MG capsule; Take 1 capsule by mouth Daily As Needed (heartburn).  Dispense: 30 capsule; Refill: 0  - Anti-reflux measures, trigger food and drinks to avoid; Fatty foods, alcohol, chocolate, coffee, tea, caffeinated soft drinks (decaffeinated coffee still has some caffeine), peppermint and spearmint, spices and vinegar, citrus fruits and juices, tomatoes and tomato sauces, and smoking. Other antireflux measures include weight reduction if overweight, avoid tight clothing around the abdomen, elevate the head of the bed 6 inches (may us a bed wedge which is placed between the mattress and box springs) or blocks under the heard of the bed. Don't drink or eat for 2 hours before going to bed and avoid lying down immediately after meals.    2. PCOS (polycystic ovarian syndrome)  -     norgestimate-ethinyl estradiol (Ortho Tri-Cyclen Lo) 0.18/0.215/0.25 MG-25 MCG per tablet; Take 1 tablet by mouth Daily.  Dispense: 28 tablet; Refill: 12    3. Birth control counseling  -     POCT pregnancy, urine  -     norgestimate-ethinyl estradiol (Ortho Tri-Cyclen Lo) 0.18/0.215/0.25 MG-25 MCG per tablet; Take 1 tablet by mouth Daily.  Dispense: 28 tablet; Refill: 12  - UPT negative.  Up-to-date on Pap smear.   Refilled birth control for 1 year.         Follow Up   Return for Annual.  Patient was given instructions and counseling regarding her condition or for health maintenance advice. Please see specific information pulled into the AVS if appropriate.     Shira RAMSEY  [facility]

## 2022-11-22 ENCOUNTER — TELEPHONE (OUTPATIENT)
Dept: INTERNAL MEDICINE | Facility: CLINIC | Age: 27
End: 2022-11-22

## 2022-11-22 NOTE — TELEPHONE ENCOUNTER
Called pt, verbally informed her we do have her nexplanon still and we are not able to put this in for her due to the lidocaine shortage but she is able to come pick the device up in office to have procedure done at gynecology, pt stated she would call her gyno and give us a call back.

## 2022-11-23 ENCOUNTER — TELEPHONE (OUTPATIENT)
Dept: OBSTETRICS AND GYNECOLOGY | Facility: CLINIC | Age: 27
End: 2022-11-23

## 2022-11-23 NOTE — TELEPHONE ENCOUNTER
----- Message from Sterling Wooten sent at 11/22/2022  4:16 PM EST -----  PATIENT'S PRIMARY CARE PHYSICIAN RACHEL EVANS HAD ORDERED NEXPLANON. THEY CANT DO INSERTION BECAUSE THEY HAVE NO LIDOCAINE AND THEY HAVE NO DATE ON WHEN THEY WILL RECEIVE ANY. THEY ADVISED PATIENT TO SEE IF WE WOULD DO THE INSERTION.       [phone]

## 2022-11-28 NOTE — TELEPHONE ENCOUNTER
Informed patient we could do Nexplanon insertion but we needed copy of recent pap. She hasn't had a recent pap with primary care or our office. Patient to call back when she decides what she wants to do.

## 2022-11-30 ENCOUNTER — TELEPHONE (OUTPATIENT)
Dept: INTERNAL MEDICINE | Facility: CLINIC | Age: 27
End: 2022-11-30

## 2022-11-30 ENCOUNTER — OFFICE VISIT (OUTPATIENT)
Dept: INTERNAL MEDICINE | Facility: CLINIC | Age: 27
End: 2022-11-30

## 2022-11-30 VITALS
TEMPERATURE: 97.8 F | DIASTOLIC BLOOD PRESSURE: 80 MMHG | WEIGHT: 238 LBS | BODY MASS INDEX: 39.65 KG/M2 | HEART RATE: 65 BPM | OXYGEN SATURATION: 100 % | HEIGHT: 65 IN | SYSTOLIC BLOOD PRESSURE: 128 MMHG

## 2022-11-30 DIAGNOSIS — F41.8 DEPRESSION WITH ANXIETY: ICD-10-CM

## 2022-11-30 DIAGNOSIS — Z30.09 COUNSELING FOR BIRTH CONTROL REGARDING INTRAUTERINE DEVICE (IUD): ICD-10-CM

## 2022-11-30 DIAGNOSIS — E28.2 PCOS (POLYCYSTIC OVARIAN SYNDROME): ICD-10-CM

## 2022-11-30 DIAGNOSIS — K21.9 GASTROESOPHAGEAL REFLUX DISEASE, UNSPECIFIED WHETHER ESOPHAGITIS PRESENT: ICD-10-CM

## 2022-11-30 DIAGNOSIS — I10 ESSENTIAL HYPERTENSION: ICD-10-CM

## 2022-11-30 DIAGNOSIS — Z00.00 ENCOUNTER FOR ANNUAL PHYSICAL EXAM: Primary | ICD-10-CM

## 2022-11-30 DIAGNOSIS — E66.01 CLASS 2 SEVERE OBESITY DUE TO EXCESS CALORIES WITH SERIOUS COMORBIDITY AND BODY MASS INDEX (BMI) OF 39.0 TO 39.9 IN ADULT: ICD-10-CM

## 2022-11-30 PROCEDURE — 99395 PREV VISIT EST AGE 18-39: CPT | Performed by: NURSE PRACTITIONER

## 2022-11-30 PROCEDURE — 2014F MENTAL STATUS ASSESS: CPT | Performed by: NURSE PRACTITIONER

## 2022-11-30 PROCEDURE — 3008F BODY MASS INDEX DOCD: CPT | Performed by: NURSE PRACTITIONER

## 2022-11-30 RX ORDER — PREDNISONE 20 MG/1
20 TABLET ORAL DAILY
COMMUNITY
Start: 2022-11-28 | End: 2022-12-20

## 2022-11-30 RX ORDER — AZITHROMYCIN 250 MG/1
TABLET, FILM COATED ORAL SEE ADMIN INSTRUCTIONS
COMMUNITY
Start: 2022-11-28 | End: 2022-12-20

## 2022-11-30 RX ORDER — CETIRIZINE HYDROCHLORIDE 10 MG/1
10 TABLET ORAL NIGHTLY
Qty: 90 TABLET | Refills: 3 | Status: SHIPPED | OUTPATIENT
Start: 2022-11-30

## 2022-11-30 RX ORDER — FLUCONAZOLE 150 MG/1
TABLET ORAL
COMMUNITY
Start: 2022-11-28 | End: 2022-12-20

## 2022-11-30 RX ORDER — AMLODIPINE BESYLATE 10 MG/1
10 TABLET ORAL DAILY
Qty: 90 TABLET | Refills: 3 | Status: SHIPPED | OUTPATIENT
Start: 2022-11-30

## 2022-11-30 RX ORDER — DESVENLAFAXINE 100 MG/1
100 TABLET, EXTENDED RELEASE ORAL DAILY
Qty: 90 TABLET | Refills: 3 | Status: SHIPPED | OUTPATIENT
Start: 2022-11-30

## 2022-11-30 RX ORDER — TRAZODONE HYDROCHLORIDE 150 MG/1
150 TABLET ORAL NIGHTLY PRN
Qty: 90 TABLET | Refills: 3 | Status: SHIPPED | OUTPATIENT
Start: 2022-11-30

## 2022-11-30 NOTE — PROGRESS NOTES
Subjective   Valery Hernandez is a 27 y.o. female and is here for a comprehensive physical exam. The patient reports no problems.    HPI: Here today for annual physical exam with no acute complaints.  A Nexplanon was ordered for her and was supposed to be inserted by our office however we are unable to get lidocaine with epinephrine at this time therefore she does plan to take her implant to [facility] with GYN for insertion.  She was told by their office she needed a new Pap smear prior to getting the Nexplanon inserted.  Her most recent Pap smear was in December 2020 and was normal.  She has never had an abnormal Pap smear as this was her first 1.  She recently underwent bariatric surgery and is doing well postsurgery.  Blood pressure is managed with amlodipine, she continues to take the medication as prescribed.  She denies chest pain, shortness of breath, orthopnea, lower extremity swelling, major fatigue, and dizziness.  She is also on metoprolol 100 mg daily.  She is now off of her metformin, most recent A1c was normal.  She has a follow-up with bariatrics tomorrow and will undergo labs at that time.  Recently seen in the office for worsening GERD symptoms, added as needed omeprazole and helping.  She continues famotidine twice daily.  Mood feels well controlled with desvenlafaxine and wishes to continue the medication.  She denies any suicidal ideations.  Since her bariatric surgery she no longer needs trazodone nightly, taking as needed.  PHQ-2 Depression Screening  Little interest or pleasure in doing things? 0-->not at all   Feeling down, depressed, or hopeless? 0-->not at all   PHQ-2 Total Score 0   Recently treated at [facility] for acute otitis media with azithromycin and prednisone, symptoms are improving.  She is vaccinated for COVID fully, does not have her card with her today to update booster vaccination.     Health Habits:  Eye exam within last 2 years? Yes.   Dental exam every 6 months? No, will  schedule.   Exercise habits: She is getting structured exercise through walking.   Healthy diet? Following post sridevi surgery diet.     The ASCVD Risk score (Bhargav LOWRY, et al., 2019) failed to calculate for the following reasons:    The 2019 ASCVD risk score is only valid for ages 40 to 79    Do you take any herbs or supplements that were not prescribed by a doctor? yes- vitamin c, d, b1, b12, and iron.   Are you taking calcium supplements? Yes.   Are you taking aspirin daily? No     History:  LMP: 2 weeks ago.   Menopause: No  Last pap date: 2020  Abnormal pap? no  Family history of breast or ovarian cancer: no       OB History    Para Term  AB Living   0 0 0 0 0 0   SAB IAB Ectopic Molar Multiple Live Births   0 0 0 0 0 0      reports being sexually active and has had partner(s) who are female. She reports using the following method of birth control/protection: OCP.        The following portions of the patient's history were reviewed and updated as appropriate: She  has a past medical history of Anemia, Anxiety, Depression, Elevated ALT measurement, Elevated HDL, Elevated hemoglobin A1c, GERD (gastroesophageal reflux disease), H. pylori infection (2021), Hiatal hernia with gastroesophageal reflux, Hypertension, Joint pain, Migraine, Polycystic ovary syndrome, Prediabetes (2021), S/P dorothy, Urinary tract infection, Vitamin D deficiency (2021), and Wears glasses.  She does not have any pertinent problems on file.  She  has a past surgical history that includes Tonsillectomy (); Paxton tooth extraction (); Laparoscopic cholecystectomy (); Esophagogastroduodenoscopy; Gastric Sleeve; Gastrectomy (N/A, 2022); Hiatal hernia repair (N/A, 2022); and Esophagogastroduodenoscopy (N/A, 2022).  Her family history includes Arthritis in an other family member; Diabetes in her paternal grandmother and another family member; Heart attack in an other family member; Heart  disease in her paternal grandmother; Hyperlipidemia in her father, paternal grandmother, and another family member; Hypertension in her father, paternal grandfather, and another family member; Obesity in her father, maternal grandfather, maternal grandmother, mother, paternal grandfather, paternal grandmother, and another family member.  She  reports that she has never smoked. She has never used smokeless tobacco. She reports current alcohol use. She reports that she does not use drugs.  Current Outpatient Medications   Medication Sig Dispense Refill   • amLODIPine (NORVASC) 10 MG tablet Take 1 tablet by mouth Daily. 90 tablet 3   • azithromycin (ZITHROMAX) 250 MG tablet Take  by mouth See Admin Instructions. Take 2 tablets by mouth on day 1 then take 1 tablet by mouth once daily on days 2-5     • baclofen (LIORESAL) 10 MG tablet Take 1 tablet by mouth At Night As Needed for Muscle Spasms. 30 tablet 1   • cetirizine (zyrTEC) 10 MG tablet Take 1 tablet by mouth Every Night. 90 tablet 3   • desvenlafaxine (Pristiq) 100 MG 24 hr tablet Take 1 tablet by mouth Daily. 90 tablet 1   • famotidine (Pepcid) 40 MG tablet Take 1 tablet by mouth 2 (Two) Times a Day As Needed for Indigestion or Heartburn. 180 tablet 3   • fluconazole (DIFLUCAN) 150 MG tablet TAKE 1 TABLET BY MOUTH AT THE END OF ANTIBIOTICS MAY REPEAT IN 72 HOURS IF NOT IMPROVING     • fluticasone (Flonase) 50 MCG/ACT nasal spray 2 sprays into the nostril(s) as directed by provider Daily. 18.2 mL 11   • hydrOXYzine pamoate (VISTARIL) 50 MG capsule Take 1 capsule by mouth At Night As Needed for Anxiety (sleep). 90 capsule 3   • metoprolol succinate XL (TOPROL-XL) 100 MG 24 hr tablet Take 1 tablet by mouth Daily. 90 tablet 3   • montelukast (SINGULAIR) 10 MG tablet Take 1 tablet by mouth Every Night. 90 tablet 3   • NON FORMULARY Bariatric Vitamins   Multi  B12  B1  Vit D  Calcium     • norgestimate-ethinyl estradiol (Ortho Tri-Cyclen Lo) 0.18/0.215/0.25 MG-25 MCG  "per tablet Take 1 tablet by mouth Daily. 28 tablet 12   • omeprazole (priLOSEC) 20 MG capsule Take 1 capsule by mouth Daily As Needed (heartburn). 30 capsule 0   • predniSONE (DELTASONE) 20 MG tablet Take 20 mg by mouth Daily.     • SUMAtriptan (IMITREX) 25 MG tablet Take 25 mg by mouth 1 (One) Time As Needed for Migraine.     • traZODone (DESYREL) 150 MG tablet Take 1 tablet by mouth Every Night. (Patient taking differently: Take 150 mg by mouth Every Night. PRN) 90 tablet 3     No current facility-administered medications for this visit.       Review of Systems  Do you have pain that bothers you in your daily life? no    Review of Systems   Constitutional: Negative for appetite change, fatigue and fever.   HENT: Positive for congestion and rhinorrhea. Negative for sore throat and trouble swallowing.    Eyes: Negative for blurred vision and visual disturbance.   Respiratory: Positive for cough. Negative for shortness of breath and wheezing.    Cardiovascular: Negative for chest pain, palpitations and leg swelling.   Gastrointestinal: Negative for abdominal pain, blood in stool, constipation, diarrhea and nausea.   Endocrine: Negative for polydipsia, polyphagia and polyuria.   Genitourinary: Negative for dysuria.   Musculoskeletal: Negative for arthralgias, back pain and myalgias.   Skin: Negative for rash.   Neurological: Negative for dizziness, weakness, light-headedness and headache.   Psychiatric/Behavioral: Negative for sleep disturbance and depressed mood. The patient is not nervous/anxious.        Objective   /80   Pulse 65   Temp 97.8 °F (36.6 °C)   Ht 165.1 cm (65\")   Wt 108 kg (238 lb)   SpO2 100%   BMI 39.61 kg/m²     Physical Exam  Vitals and nursing note reviewed. Exam conducted with a chaperone present (Cinthia Paez CMA).   Constitutional:       General: She is not in acute distress.     Appearance: Normal appearance. She is obese. She is not ill-appearing.   HENT:      Right Ear: Ear " canal normal. Tympanic membrane is erythematous. Tympanic membrane is not bulging.      Left Ear: Ear canal normal. Tympanic membrane is erythematous. Tympanic membrane is not bulging.      Nose: Nose normal.      Mouth/Throat:      Mouth: Mucous membranes are moist.      Pharynx: Oropharynx is clear. No posterior oropharyngeal erythema.   Eyes:      Extraocular Movements: Extraocular movements intact.      Pupils: Pupils are equal, round, and reactive to light.   Neck:      Thyroid: No thyroid mass or thyromegaly.      Vascular: No carotid bruit.   Cardiovascular:      Rate and Rhythm: Normal rate and regular rhythm.      Pulses: Normal pulses.      Heart sounds: Normal heart sounds. No murmur heard.  Pulmonary:      Effort: Pulmonary effort is normal.      Breath sounds: Normal breath sounds. No wheezing.   Chest:      Chest wall: No mass or tenderness.   Breasts:     Marciano Score is 5.      Right: Normal. No bleeding, inverted nipple, nipple discharge or skin change.      Left: Normal. No bleeding, inverted nipple, nipple discharge or skin change.   Abdominal:      General: Bowel sounds are normal. There is no distension.      Palpations: Abdomen is soft. There is no mass.      Tenderness: There is no abdominal tenderness.   Genitourinary:     Exam position: Lithotomy position.      Pubic Area: No rash.       Marciano stage (genital): 5.      Labia:         Right: No rash or lesion.         Left: No rash or lesion.       Vagina: Normal. No vaginal discharge, tenderness, bleeding or lesions.      Cervix: Friability present. No discharge, lesion, erythema or cervical bleeding.      Uterus: Normal. Not enlarged.       Adnexa: Right adnexa normal and left adnexa normal.        Right: No tenderness.          Left: No tenderness.     Musculoskeletal:         General: No deformity. Normal range of motion.      Cervical back: Normal range of motion and neck supple. No muscular tenderness.   Lymphadenopathy:      Head:       Right side of head: No submandibular, tonsillar, preauricular or posterior auricular adenopathy.      Left side of head: No submandibular, tonsillar, preauricular or posterior auricular adenopathy.      Cervical: No cervical adenopathy.      Upper Body:      Right upper body: No supraclavicular, axillary or pectoral adenopathy.      Left upper body: No supraclavicular, axillary or pectoral adenopathy.   Skin:     General: Skin is warm and dry.      Capillary Refill: Capillary refill takes less than 2 seconds.      Findings: No bruising or rash.   Neurological:      Mental Status: She is alert and oriented to person, place, and time.      Gait: Gait normal.      Deep Tendon Reflexes: Reflexes normal.   Psychiatric:         Mood and Affect: Mood normal.         Behavior: Behavior normal.        Assessment & Plan   Healthy female exam.      Diagnosis Plan   1. Encounter for annual physical exam  LIQUID-BASED PAP SMEAR, [facility] ([facility])    Health maintenance discussed during visit and information provided in AVS. Recommend monthly self breast exams, continue healthy diet, exercise, and annual physicals.       2. Counseling for birth control regarding intrauterine device (IUD)  LIQUID-BASED PAP SMEAR, [facility] ([facility])    Discussed case with OB/GYN office, spoke with Camryn who advised provider placing implant requires an annual Pap smear. Performed and unremarkable visually today.       3. Gastroesophageal reflux disease, unspecified whether esophagitis present  Avoid eating spicy foods, caffeinated and carbonated beverages, alcohol, and chocolate. Try not to eat or drink within 3 hours of going to bed at night. May elevate the head of the bed. Eat smaller portions. Adhere to medication regimen as prescribed.      4. PCOS (polycystic ovarian syndrome)  Off of metformin for now, periods controlled and regular with hormonal contraception. Keep appt with OBGYN for nexplanon placement.       5. Essential  hypertension  amLODIPine (NORVASC) 10 MG tablet    Stable. Continue current medications as prescribed. Recommend DASH diet, moderate-intensity exercises 4-5 times/week, medication compliance, and home blood pressure monitoring.         6. Depression with anxiety  desvenlafaxine (Pristiq) 100 MG 24 hr tablet    traZODone (DESYREL) 150 MG tablet    Stable. Continue current medication regimen, talking with supportive family/friends, healthy diet, exercise, and daily sun exposure also encouraged along with sleep hygiene.       7. Class 2 severe obesity due to excess calories with serious comorbidity and body mass index (BMI) of 39.0 to 39.9 in adult ([facility])  Congratulated on weight loss thus far and encouraged to continue recommended bariatric diet and increase exercise as tolerated. Hopeful to come off of some chronic medications with further weight loss.         2. Patient Counseling:  --Nutrition: Stressed importance of moderation in sodium/caffeine intake, saturated fat and cholesterol, caloric balance, sufficient intake of fresh fruits, vegetables, fiber, calcium, iron, and 1 g folate supplementation if of childbearing age.   --Discussed the issue of calcium supplement, and the daily use of baby aspirin if applicable.             --Mammogram recommended every 2 years from age 40-49 and yearly beginning at age 50.  --Exercise: Stressed the importance of regular exercise.   --Substance Abuse: Discussed cessation/primary prevention of tobacco (if applicable), alcohol, or other drug use (if applicable); driving or other dangerous activities under the influence; availability of treatment for abuse.    --Sexuality: Discussed sexually transmitted diseases, partner selection, use of condoms, avoidance of unintended pregnancy  and contraceptive alternatives.   --Injury prevention: Discussed safety belts, safety helmets, smoke detector, smoking near bedding or upholstery.   --Dental health: Discussed importance of regular  tooth brushing, flossing, and dental visits every 6 months.  --Immunizations reviewed.  --After hours service discussed with patient  3. Discussed the patient's BMI with her.  The BMI is above average; no BMI management plan is appropriate  4. Return in about 6 months (around 5/30/2023) for Next scheduled follow up.     Kellie Rush, BRAULIO  11/30/2022  13:05 EST

## 2022-11-30 NOTE — TELEPHONE ENCOUNTER
Called OBGYN office and spoke with Camryn. Discussed concerns that patient would be paying out of pocket for pap smear as per guidelines she is UTD. She informed me she discussed with provider and require annual pap smear for insertion of implantable birth control. Discussed with patient and she is agreeable to the pap smear today.

## 2022-12-01 ENCOUNTER — OFFICE VISIT (OUTPATIENT)
Dept: BARIATRICS/WEIGHT MGMT | Facility: CLINIC | Age: 27
End: 2022-12-01

## 2022-12-01 VITALS
BODY MASS INDEX: 39.79 KG/M2 | HEART RATE: 86 BPM | SYSTOLIC BLOOD PRESSURE: 128 MMHG | HEIGHT: 65 IN | DIASTOLIC BLOOD PRESSURE: 80 MMHG | WEIGHT: 238.8 LBS | TEMPERATURE: 98.4 F | OXYGEN SATURATION: 97 %

## 2022-12-01 DIAGNOSIS — R53.83 FATIGUE, UNSPECIFIED TYPE: ICD-10-CM

## 2022-12-01 DIAGNOSIS — E55.9 HYPOVITAMINOSIS D: ICD-10-CM

## 2022-12-01 DIAGNOSIS — K91.2 POSTGASTRECTOMY MALABSORPTION: ICD-10-CM

## 2022-12-01 DIAGNOSIS — Z13.0 SCREENING, IRON DEFICIENCY ANEMIA: ICD-10-CM

## 2022-12-01 DIAGNOSIS — Z90.3 POSTGASTRECTOMY MALABSORPTION: ICD-10-CM

## 2022-12-01 DIAGNOSIS — E66.9 OBESITY, CLASS II, BMI 35-39.9: Primary | ICD-10-CM

## 2022-12-01 DIAGNOSIS — Z13.21 MALNUTRITION SCREEN: ICD-10-CM

## 2022-12-01 DIAGNOSIS — Z98.84 STATUS POST BARIATRIC SURGERY: ICD-10-CM

## 2022-12-01 PROCEDURE — 99214 OFFICE O/P EST MOD 30 MIN: CPT | Performed by: PHYSICIAN ASSISTANT

## 2022-12-01 NOTE — PROGRESS NOTES
[facility]ariatric [facility]  [address]  [address]  [facility] [id number] [phone]        Patient Name:  Valery Hernandez.  :  1995        Reason for Visit:   7 months postop      HPI: Valery Hernandez is a 27 y.o. female s/p LSG/ HHR by  on 22    Doing well.  She is on a Z-Félix and prednisone for respiratory infection currently.  Otherwise says she is doing well and pleased with progress so far.  Reflux is controlled on daily Pepcid, has omeprazole to take on as needed basis but not needed frequently.  Feels overall she is tolerating her food better and adjusting well.  Has chronic diarrhea that predates surgery, she suspects related to past cholecystectomy.  No other issues/concerns. Denies dysphagia, nausea, vomiting and abdominal pain.  Getting 80-100g prot/day. Eating 3-4 times a day + protein shake in morning.  Drinking 64+ fluid oz/day.  LOV 1 month postop, labs were not completed.   Taking MVI, B12, B1, Calcium, Vit D, iron and Vit C.  On pepcid daily.  Exercising- walking.     Presurgery weight: 295 pounds.  Today's weight is 108 kg (238 lb 12.8 oz) pounds, today's  Body mass index is 39.74 kg/m²., and@ weight loss since surgery is 57 pounds.      Past Medical History:   Diagnosis Date   • Anemia    • Anxiety    • Depression    • Elevated ALT measurement    • Elevated HDL    • Elevated hemoglobin A1c    • GERD (gastroesophageal reflux disease)     pepcid daily controls symptoms, h pylori antibodies + with treatment, no prior EGD   • H. pylori infection 2021   • Hiatal hernia with gastroesophageal reflux     EGD Dr. Viveros 2021   • Hypertension    • Joint pain     not treated with meds   • Migraine     takes sumatriptan prn   • Polycystic ovary syndrome    • Prediabetes 2021   • S/P dorothy     acalculous   • Urinary tract infection    • Vitamin D deficiency 2021   • Wears glasses      Past Surgical History:   Procedure Laterality Date   •  ENDOSCOPY     • ENDOSCOPY N/A 5/5/2022    Procedure: ESOPHAGOGASTRODUODENOSCOPY;  Surgeon: Junaid Viveros MD;  Location:  [facility];  Service: Bariatric;  Laterality: N/A;   • GASTRECTOMY N/A 5/5/2022    Procedure: GASTRECTOMY LAPAROSCOPIC;  Surgeon: Junaid Viveros MD;  Location:  [facility];  Service: Bariatric;  Laterality: N/A;   • GASTRIC SLEEVE LAPAROSCOPIC     • HIATAL HERNIA REPAIR N/A 5/5/2022    Procedure: HIATAL HERNIA REPAIR LAPAROSCOPIC;  Surgeon: Junaid Viveros MD;  Location:  [facility];  Service: Bariatric;  Laterality: N/A;   • LAPAROSCOPIC CHOLECYSTECTOMY  2016   • TONSILLECTOMY  2003   • WISDOM TOOTH EXTRACTION  2014     Outpatient Medications Marked as Taking for the 12/1/22 encounter (Office Visit) with Ami Jacobson PA-C   Medication Sig Dispense Refill   • amLODIPine (NORVASC) 10 MG tablet Take 1 tablet by mouth Daily. 90 tablet 3   • azithromycin (ZITHROMAX) 250 MG tablet Take  by mouth See Admin Instructions. Take 2 tablets by mouth on day 1 then take 1 tablet by mouth once daily on days 2-5     • baclofen (LIORESAL) 10 MG tablet Take 1 tablet by mouth At Night As Needed for Muscle Spasms. 30 tablet 1   • cetirizine (zyrTEC) 10 MG tablet Take 1 tablet by mouth Every Night. 90 tablet 3   • desvenlafaxine (Pristiq) 100 MG 24 hr tablet Take 1 tablet by mouth Daily. 90 tablet 3   • famotidine (Pepcid) 40 MG tablet Take 1 tablet by mouth 2 (Two) Times a Day As Needed for Indigestion or Heartburn. 180 tablet 3   • fluticasone (Flonase) 50 MCG/ACT nasal spray 2 sprays into the nostril(s) as directed by provider Daily. 18.2 mL 11   • hydrOXYzine pamoate (VISTARIL) 50 MG capsule Take 1 capsule by mouth At Night As Needed for Anxiety (sleep). 90 capsule 3   • metoprolol succinate XL (TOPROL-XL) 100 MG 24 hr tablet Take 1 tablet by mouth Daily. 90 tablet 3   • montelukast (SINGULAIR) 10 MG tablet Take 1 tablet by mouth Every Night. 90 tablet 3   • NON FORMULARY Bariatric Vitamins  "  Multi  B12  B1  Vit D  Calcium     • norgestimate-ethinyl estradiol (Ortho Tri-Cyclen Lo) 0.18/0.215/0.25 MG-25 MCG per tablet Take 1 tablet by mouth Daily. 28 tablet 12   • omeprazole (priLOSEC) 20 MG capsule Take 1 capsule by mouth Daily As Needed (heartburn). 30 capsule 0   • predniSONE (DELTASONE) 20 MG tablet Take 20 mg by mouth Daily.     • SUMAtriptan (IMITREX) 25 MG tablet Take 25 mg by mouth 1 (One) Time As Needed for Migraine.     • traZODone (DESYREL) 150 MG tablet Take 1 tablet by mouth At Night As Needed for Sleep. PRN 90 tablet 3       Allergies   Allergen Reactions   • Prozac [Fluoxetine] Myalgia   • Metformin GI Intolerance   • Topamax [Topiramate] Rash     Rash, tingling and numnbess in bilateral feet.        Social History     Socioeconomic History   • Marital status: Single   Tobacco Use   • Smoking status: Never   • Smokeless tobacco: Never   Vaping Use   • Vaping Use: Never used   Substance and Sexual Activity   • Alcohol use: Yes     Comment: social   • Drug use: No   • Sexual activity: Yes     Partners: Female     Birth control/protection: OCP       /80 (BP Location: Left arm, Patient Position: Sitting, Cuff Size: Adult)   Pulse 86   Temp 98.4 °F (36.9 °C) (Infrared)   Ht 165.1 cm (65\")   Wt 108 kg (238 lb 12.8 oz)   SpO2 97%   BMI 39.74 kg/m²     Physical Exam  Constitutional:       Appearance: She is well-developed. She is obese.   HENT:      Head: Normocephalic and atraumatic.   Cardiovascular:      Rate and Rhythm: Normal rate and regular rhythm.   Pulmonary:      Effort: Pulmonary effort is normal.      Breath sounds: Normal breath sounds.   Abdominal:      General: Bowel sounds are normal.      Palpations: Abdomen is soft.   Skin:     General: Skin is warm and dry.   Neurological:      Mental Status: She is alert.   Psychiatric:         Mood and Affect: Mood normal.         Behavior: Behavior normal.         Thought Content: Thought content normal.         Judgment: " Judgment normal.           Assessment:  7 months s/p LSG/ HHR by  on 5/5/22    ICD-10-CM ICD-9-CM   1. Obesity, Class II, BMI 35-39.9  E66.9 278.00   2. Fatigue, unspecified type  R53.83 780.79   3. Status post bariatric surgery  Z98.84 V45.86   4. Hypovitaminosis D  E55.9 268.9   5. Screening, iron deficiency anemia  Z13.0 V78.0   6. Malnutrition screen  Z13.21 V77.2   7. Postgastrectomy malabsorption  K91.2 579.3    Z90.3          Plan: Doing well.  Can discuss chronic diarrhea with PCP.  Reflux well controlled with Pepcid daily, okay to continue this plan.  Continue w/ good food choices and healthy habits.  Continue to focus on high protein, low carb.  Keep tracking intake.  Encouraged routine exercise with strength training.  Routine bariatric labs ordered.  Continue vitamins w/ adjustments pending lab results.  Call w/ problems/concerns.     Class 2 Severe Obesity (BMI >=35 and <=39.9). Obesity-related health conditions include the following: as above. Obesity is improving with treatment. BMI is is above average; BMI management plan is completed. We discussed low calorie, low carb based diet program, portion control and increasing exercise.        The patient was instructed to follow up in 3 months, sooner if needed.

## 2022-12-03 LAB
25(OH)D3+25(OH)D2 SERPL-MCNC: 43.9 NG/ML (ref 30–100)
ALBUMIN SERPL-MCNC: 4.8 G/DL (ref 3.5–5.2)
ALBUMIN/GLOB SERPL: 2.2 G/DL
ALP SERPL-CCNC: 117 U/L (ref 39–117)
ALT SERPL-CCNC: 30 U/L (ref 1–33)
AST SERPL-CCNC: 18 U/L (ref 1–32)
BASOPHILS # BLD AUTO: 0.04 10*3/MM3 (ref 0–0.2)
BASOPHILS NFR BLD AUTO: 0.4 % (ref 0–1.5)
BILIRUB SERPL-MCNC: 0.3 MG/DL (ref 0–1.2)
BUN SERPL-MCNC: 10 MG/DL (ref 6–20)
BUN/CREAT SERPL: 15.9 (ref 7–25)
CALCIUM SERPL-MCNC: 9.9 MG/DL (ref 8.6–10.5)
CHLORIDE SERPL-SCNC: 100 MMOL/L (ref 98–107)
CO2 SERPL-SCNC: 28 MMOL/L (ref 22–29)
CREAT SERPL-MCNC: 0.63 MG/DL (ref 0.57–1)
EGFRCR SERPLBLD CKD-EPI 2021: 126.4 ML/MIN/1.73
EOSINOPHIL # BLD AUTO: 0.03 10*3/MM3 (ref 0–0.4)
EOSINOPHIL NFR BLD AUTO: 0.3 % (ref 0.3–6.2)
ERYTHROCYTE [DISTWIDTH] IN BLOOD BY AUTOMATED COUNT: 12 % (ref 12.3–15.4)
FERRITIN SERPL-MCNC: 146 NG/ML (ref 13–150)
FOLATE SERPL-MCNC: 4.03 NG/ML (ref 4.78–24.2)
GLOBULIN SER CALC-MCNC: 2.2 GM/DL
GLUCOSE SERPL-MCNC: 75 MG/DL (ref 65–99)
HCT VFR BLD AUTO: 40.8 % (ref 34–46.6)
HGB BLD-MCNC: 13.5 G/DL (ref 12–15.9)
IMM GRANULOCYTES # BLD AUTO: 0.03 10*3/MM3 (ref 0–0.05)
IMM GRANULOCYTES NFR BLD AUTO: 0.3 % (ref 0–0.5)
IRON SERPL-MCNC: 98 MCG/DL (ref 37–145)
LYMPHOCYTES # BLD AUTO: 3.02 10*3/MM3 (ref 0.7–3.1)
LYMPHOCYTES NFR BLD AUTO: 31.4 % (ref 19.6–45.3)
MCH RBC QN AUTO: 28.7 PG (ref 26.6–33)
MCHC RBC AUTO-ENTMCNC: 33.1 G/DL (ref 31.5–35.7)
MCV RBC AUTO: 86.8 FL (ref 79–97)
METHYLMALONATE SERPL-SCNC: 101 NMOL/L (ref 0–378)
MONOCYTES # BLD AUTO: 0.51 10*3/MM3 (ref 0.1–0.9)
MONOCYTES NFR BLD AUTO: 5.3 % (ref 5–12)
NEUTROPHILS # BLD AUTO: 5.99 10*3/MM3 (ref 1.7–7)
NEUTROPHILS NFR BLD AUTO: 62.3 % (ref 42.7–76)
NRBC BLD AUTO-RTO: 0 /100 WBC (ref 0–0.2)
PLATELET # BLD AUTO: 348 10*3/MM3 (ref 140–450)
POTASSIUM SERPL-SCNC: 4.4 MMOL/L (ref 3.5–5.2)
PREALB SERPL-MCNC: 23 MG/DL (ref 14–35)
PROT SERPL-MCNC: 7 G/DL (ref 6–8.5)
RBC # BLD AUTO: 4.7 10*6/MM3 (ref 3.77–5.28)
SODIUM SERPL-SCNC: 140 MMOL/L (ref 136–145)
VIT B1 BLD-SCNC: 143.7 NMOL/L (ref 66.5–200)
WBC # BLD AUTO: 9.62 10*3/MM3 (ref 3.4–10.8)

## 2022-12-06 LAB — REF LAB TEST METHOD: NORMAL

## 2022-12-20 ENCOUNTER — OFFICE VISIT (OUTPATIENT)
Dept: OBSTETRICS AND GYNECOLOGY | Facility: CLINIC | Age: 27
End: 2022-12-20

## 2022-12-20 VITALS
BODY MASS INDEX: 39.79 KG/M2 | WEIGHT: 238.8 LBS | SYSTOLIC BLOOD PRESSURE: 120 MMHG | DIASTOLIC BLOOD PRESSURE: 80 MMHG | HEIGHT: 65 IN

## 2022-12-20 DIAGNOSIS — Z30.017 NEXPLANON INSERTION: Primary | ICD-10-CM

## 2022-12-20 PROCEDURE — 11981 INSERTION DRUG DLVR IMPLANT: CPT | Performed by: PHYSICIAN ASSISTANT

## 2022-12-20 PROCEDURE — 81025 URINE PREGNANCY TEST: CPT | Performed by: PHYSICIAN ASSISTANT

## 2022-12-20 NOTE — PROGRESS NOTES
Nexplanon Insertion    Patient's last menstrual period was 12/16/2022 (exact date).    Date of procedure:  12/20/2022    Risks and benefits discussed? yes  All questions answered? yes  Consents given by the patient  Written consent obtained? yes    Local anesthesia used:  yes - 1.5 cc's of  Meds; anesthesia local: None, 2% lidocaine    Procedure documentation:    The upper left arm was marked at the intended site of insertion.  Betadine was used to cleanse the skin.  Local anesthesia was injected.  The Nexplanon was placed subdermally without difficulty.  The devise was able to be palpated in the arm by both myself and Valery.  Steri-strips were then placed across the site of insertion and the arm was wrapped.    She tolerated the procedure well.  There were no complications.  EBL was minimal.    Post procedure instructions: Remove the wrapping in 24 hours and the steri-strips in 5 days.    Follow up needed: PRN    This note was electronically signed.    Tori Hernandes PA-C  December 20, 2022

## 2023-02-08 ENCOUNTER — OFFICE VISIT (OUTPATIENT)
Dept: INTERNAL MEDICINE | Facility: CLINIC | Age: 28
End: 2023-02-08
Payer: COMMERCIAL

## 2023-02-08 VITALS
OXYGEN SATURATION: 99 % | RESPIRATION RATE: 17 BRPM | HEART RATE: 86 BPM | TEMPERATURE: 97.6 F | SYSTOLIC BLOOD PRESSURE: 126 MMHG | HEIGHT: 65 IN | WEIGHT: 233 LBS | DIASTOLIC BLOOD PRESSURE: 70 MMHG | BODY MASS INDEX: 38.82 KG/M2

## 2023-02-08 DIAGNOSIS — N30.00 ACUTE CYSTITIS WITHOUT HEMATURIA: ICD-10-CM

## 2023-02-08 DIAGNOSIS — R30.0 DYSURIA: ICD-10-CM

## 2023-02-08 DIAGNOSIS — R39.9 UTI SYMPTOMS: Primary | ICD-10-CM

## 2023-02-08 DIAGNOSIS — B37.31 YEAST VAGINITIS: ICD-10-CM

## 2023-02-08 LAB
BILIRUB BLD-MCNC: ABNORMAL MG/DL
CLARITY, POC: ABNORMAL
COLOR UR: ABNORMAL
EXPIRATION DATE: ABNORMAL
GLUCOSE UR STRIP-MCNC: NEGATIVE MG/DL
KETONES UR QL: NEGATIVE
LEUKOCYTE EST, POC: ABNORMAL
Lab: ABNORMAL
NITRITE UR-MCNC: POSITIVE MG/ML
PH UR: 6 [PH] (ref 5–8)
PROT UR STRIP-MCNC: ABNORMAL MG/DL
RBC # UR STRIP: NEGATIVE /UL
SP GR UR: 1.02 (ref 1–1.03)
UROBILINOGEN UR QL: NORMAL

## 2023-02-08 PROCEDURE — 99213 OFFICE O/P EST LOW 20 MIN: CPT | Performed by: INTERNAL MEDICINE

## 2023-02-08 RX ORDER — NITROFURANTOIN 25; 75 MG/1; MG/1
100 CAPSULE ORAL 2 TIMES DAILY
Qty: 10 CAPSULE | Refills: 0 | Status: SHIPPED | OUTPATIENT
Start: 2023-02-08

## 2023-02-08 RX ORDER — FLUCONAZOLE 150 MG/1
150 TABLET ORAL ONCE
Qty: 1 TABLET | Refills: 0 | Status: SHIPPED | OUTPATIENT
Start: 2023-02-08 | End: 2023-02-08

## 2023-02-08 NOTE — PROGRESS NOTES
Chief Complaint   Patient presents with   • Urinary Frequency   • Difficulty Urinating     Pain and burning x 2 days   • Back Pain     lower       Subjective     History of Present Illness   Valery Hernandez is a 27 y.o. female who presents with 2 days of urinary symptoms. Symptoms include dysuria, polyuria, and suprapubic discomfort.  Patient has tried Azo which helps some but last night her back pain was getting worse.  She has had frequent UTIs about every 3-4 months.     The following portions of the patient's history were reviewed and updated as appropriate: allergies, current medications, past family history, past medical history, past social history, past surgical history and problem list.    Review of Systems   Constitutional: Negative for chills, diaphoresis, fatigue and fever.   HENT: Negative for congestion, ear pain and sinus pressure.    Eyes: Negative for pain and discharge.   Respiratory: Negative for apnea, cough and shortness of breath.    Cardiovascular: Negative for chest pain and leg swelling.   Gastrointestinal: Negative for abdominal distention, diarrhea, nausea and vomiting.   Endocrine: Negative for cold intolerance and heat intolerance.   Genitourinary: Positive for dysuria, frequency and urgency. Negative for decreased urine volume, flank pain and hematuria.   Musculoskeletal: Negative for arthralgias and myalgias.   Skin: Negative for color change and rash.   Neurological: Negative for dizziness and syncope.   Psychiatric/Behavioral: Negative for confusion, dysphoric mood and sleep disturbance.       Allergies   Allergen Reactions   • Prozac [Fluoxetine] Myalgia   • Metformin GI Intolerance   • Topamax [Topiramate] Rash     Rash, tingling and numnbess in bilateral feet.        Past Medical History:   Diagnosis Date   • Anemia    • Anxiety    • Depression    • Elevated ALT measurement    • Elevated HDL    • Elevated hemoglobin A1c    • GERD (gastroesophageal reflux disease)     pepcid daily  controls symptoms, h pylori antibodies + with treatment, no prior EGD   • H. pylori infection 04/22/2021   • Hiatal hernia with gastroesophageal reflux     EGD Dr. Viveros 7/19/2021   • Hypertension    • Joint pain     not treated with meds   • Migraine     takes sumatriptan prn   • Polycystic ovary syndrome    • Prediabetes 04/22/2021   • S/P dorothy     acalculous   • Urinary tract infection    • Vitamin D deficiency 04/22/2021   • Wears glasses        Social History     Socioeconomic History   • Marital status: Single   Tobacco Use   • Smoking status: Never   • Smokeless tobacco: Never   Vaping Use   • Vaping Use: Never used   Substance and Sexual Activity   • Alcohol use: Yes     Comment: social   • Drug use: No   • Sexual activity: Yes     Partners: Female     Birth control/protection: Nexplanon        Past Surgical History:   Procedure Laterality Date   • ENDOSCOPY     • ENDOSCOPY N/A 5/5/2022    Procedure: ESOPHAGOGASTRODUODENOSCOPY;  Surgeon: Junaid Viveros MD;  Location:  [facility];  Service: Bariatric;  Laterality: N/A;   • GASTRECTOMY N/A 5/5/2022    Procedure: GASTRECTOMY LAPAROSCOPIC;  Surgeon: Junaid Viveros MD;  Location:  [facility];  Service: Bariatric;  Laterality: N/A;   • GASTRIC SLEEVE LAPAROSCOPIC     • HIATAL HERNIA REPAIR N/A 5/5/2022    Procedure: HIATAL HERNIA REPAIR LAPAROSCOPIC;  Surgeon: Junaid Viveros MD;  Location:  [facility];  Service: Bariatric;  Laterality: N/A;   • LAPAROSCOPIC CHOLECYSTECTOMY  2016   • TONSILLECTOMY  2003   • WISDOM TOOTH EXTRACTION  2014       Family History   Problem Relation Age of Onset   • Hyperlipidemia Father    • Hypertension Father    • Obesity Father    • Hypertension Paternal Grandfather    • Obesity Paternal Grandfather    • Heart disease Paternal Grandmother    • Hyperlipidemia Paternal Grandmother    • Obesity Paternal Grandmother    • Diabetes Paternal Grandmother    • Arthritis Other    • Diabetes Other    • Heart attack Other    •  Hypertension Other    • Hyperlipidemia Other    • Obesity Other    • Obesity Mother    • Obesity Maternal Grandmother    • Obesity Maternal Grandfather          Current Outpatient Medications:   •  amLODIPine (NORVASC) 10 MG tablet, Take 1 tablet by mouth Daily., Disp: 90 tablet, Rfl: 3  •  baclofen (LIORESAL) 10 MG tablet, Take 1 tablet by mouth At Night As Needed for Muscle Spasms., Disp: 30 tablet, Rfl: 1  •  cetirizine (zyrTEC) 10 MG tablet, Take 1 tablet by mouth Every Night., Disp: 90 tablet, Rfl: 3  •  desvenlafaxine (Pristiq) 100 MG 24 hr tablet, Take 1 tablet by mouth Daily., Disp: 90 tablet, Rfl: 3  •  famotidine (Pepcid) 40 MG tablet, Take 1 tablet by mouth 2 (Two) Times a Day As Needed for Indigestion or Heartburn., Disp: 180 tablet, Rfl: 3  •  fluticasone (Flonase) 50 MCG/ACT nasal spray, 2 sprays into the nostril(s) as directed by provider Daily., Disp: 18.2 mL, Rfl: 11  •  hydrOXYzine pamoate (VISTARIL) 50 MG capsule, Take 1 capsule by mouth At Night As Needed for Anxiety (sleep)., Disp: 90 capsule, Rfl: 3  •  metoprolol succinate XL (TOPROL-XL) 100 MG 24 hr tablet, Take 1 tablet by mouth Daily., Disp: 90 tablet, Rfl: 3  •  montelukast (SINGULAIR) 10 MG tablet, Take 1 tablet by mouth Every Night., Disp: 90 tablet, Rfl: 3  •  NON FORMULARY, Bariatric Vitamins  Multi B12 B1 Vit D Calcium, Disp: , Rfl:   •  omeprazole (priLOSEC) 20 MG capsule, Take 1 capsule by mouth Daily As Needed (heartburn)., Disp: 30 capsule, Rfl: 0  •  SUMAtriptan (IMITREX) 25 MG tablet, Take 25 mg by mouth 1 (One) Time As Needed for Migraine., Disp: , Rfl:   •  traZODone (DESYREL) 150 MG tablet, Take 1 tablet by mouth At Night As Needed for Sleep. PRN, Disp: 90 tablet, Rfl: 3  •  fluconazole (Diflucan) 150 MG tablet, Take 1 tablet by mouth 1 (One) Time for 1 dose., Disp: 1 tablet, Rfl: 0  •  nitrofurantoin, macrocrystal-monohydrate, (Macrobid) 100 MG capsule, Take 1 capsule by mouth 2 (Two) Times a Day., Disp: 10 capsule, Rfl:  "0    Objective   /70   Pulse 86   Temp 97.6 °F (36.4 °C)   Resp 17   Ht 165.1 cm (65\")   Wt 106 kg (233 lb)   SpO2 99%   BMI 38.77 kg/m²     Physical Exam  Vitals and nursing note reviewed.   Constitutional:       Appearance: Normal appearance. She is well-developed. She is obese.   HENT:      Head: Normocephalic and atraumatic.   Eyes:      Extraocular Movements: Extraocular movements intact.      Conjunctiva/sclera: Conjunctivae normal.   Pulmonary:      Effort: Pulmonary effort is normal.   Musculoskeletal:      Cervical back: Normal range of motion and neck supple.   Skin:     General: Skin is warm and dry.      Findings: No rash.   Neurological:      General: No focal deficit present.      Mental Status: She is alert and oriented to person, place, and time.   Psychiatric:         Mood and Affect: Mood normal.         Behavior: Behavior normal.         Lab Results:  Results for orders placed or performed in visit on 02/08/23   POCT urinalysis dipstick, automated    Specimen: Urine   Result Value Ref Range    Color Orange (A) Yellow, Straw, Dark Yellow, Fawn    Clarity, UA Cloudy (A) Clear    Specific Gravity  1.025 1.005 - 1.030    pH, Urine 6.0 5.0 - 8.0    Leukocytes Small (1+) (A) Negative    Nitrite, UA Positive (A) Negative    Protein, POC Trace (A) Negative mg/dL    Glucose, UA Negative Negative mg/dL    Ketones, UA Negative Negative    Urobilinogen, UA Normal Normal, 0.2 E.U./dL    Bilirubin Small (1+) (A) Negative    Blood, UA Negative Negative    Lot Number [id number]     Expiration Date 2,082,024        Assessment & Plan   Diagnoses and all orders for this visit:    1. UTI symptoms (Primary)  -     POCT urinalysis dipstick, automated  -     Urine Culture - Urine, Urine, Clean Catch; Future    2. Dysuria  -     Urine Culture - Urine, Urine, Clean Catch; Future    3. Acute cystitis without hematuria  -     nitrofurantoin, macrocrystal-monohydrate, (Macrobid) 100 MG capsule; Take 1 " capsule by mouth 2 (Two) Times a Day.  Dispense: 10 capsule; Refill: 0    4. Yeast vaginitis  -     fluconazole (Diflucan) 150 MG tablet; Take 1 tablet by mouth 1 (One) Time for 1 dose.  Dispense: 1 tablet; Refill: 0          Discussion Summary:   Patient is a 27 y.o. female presenting for UTI.     Urinary Tract Infection   - start Nitrofurantoin  - Patient was advised on keeping hydrated and maintaining hygeine to prevent further UTIs.      Yeast vaginitis  - diflucan prescribed.     Follow up:  No follow-ups on file.     Patient Instructions:  Patient instructions were provided.

## 2023-05-23 ENCOUNTER — OFFICE VISIT (OUTPATIENT)
Dept: INTERNAL MEDICINE | Facility: CLINIC | Age: 28
End: 2023-05-23
Payer: COMMERCIAL

## 2023-05-23 VITALS
OXYGEN SATURATION: 97 % | WEIGHT: 226 LBS | HEIGHT: 65 IN | TEMPERATURE: 96.8 F | BODY MASS INDEX: 37.65 KG/M2 | DIASTOLIC BLOOD PRESSURE: 86 MMHG | SYSTOLIC BLOOD PRESSURE: 118 MMHG | HEART RATE: 95 BPM

## 2023-05-23 DIAGNOSIS — I10 ESSENTIAL HYPERTENSION: ICD-10-CM

## 2023-05-23 DIAGNOSIS — F41.8 DEPRESSION WITH ANXIETY: ICD-10-CM

## 2023-05-23 DIAGNOSIS — K21.9 GASTROESOPHAGEAL REFLUX DISEASE, UNSPECIFIED WHETHER ESOPHAGITIS PRESENT: ICD-10-CM

## 2023-05-23 DIAGNOSIS — M54.50 ACUTE MIDLINE LOW BACK PAIN WITHOUT SCIATICA: ICD-10-CM

## 2023-05-23 DIAGNOSIS — E66.01 CLASS 2 SEVERE OBESITY DUE TO EXCESS CALORIES WITH SERIOUS COMORBIDITY AND BODY MASS INDEX (BMI) OF 37.0 TO 37.9 IN ADULT: Primary | ICD-10-CM

## 2023-05-23 RX ORDER — METOPROLOL SUCCINATE 100 MG/1
100 TABLET, EXTENDED RELEASE ORAL DAILY
Qty: 90 TABLET | Refills: 3 | Status: SHIPPED | OUTPATIENT
Start: 2023-05-23

## 2023-05-23 RX ORDER — FAMOTIDINE 40 MG/1
40 TABLET, FILM COATED ORAL 2 TIMES DAILY PRN
Qty: 180 TABLET | Refills: 3 | Status: SHIPPED | OUTPATIENT
Start: 2023-05-23

## 2023-05-23 RX ORDER — MONTELUKAST SODIUM 10 MG/1
10 TABLET ORAL NIGHTLY
Qty: 90 TABLET | Refills: 3 | Status: SHIPPED | OUTPATIENT
Start: 2023-05-23

## 2023-05-23 RX ORDER — TRAZODONE HYDROCHLORIDE 150 MG/1
150 TABLET ORAL NIGHTLY PRN
Qty: 90 TABLET | Refills: 3 | Status: SHIPPED | OUTPATIENT
Start: 2023-05-23

## 2023-05-23 RX ORDER — AMLODIPINE BESYLATE 5 MG/1
5 TABLET ORAL DAILY
Qty: 90 TABLET | Refills: 3 | Status: SHIPPED | OUTPATIENT
Start: 2023-05-23

## 2023-05-23 RX ORDER — FLUTICASONE PROPIONATE 50 MCG
2 SPRAY, SUSPENSION (ML) NASAL DAILY
Qty: 18.2 ML | Refills: 11 | Status: SHIPPED | OUTPATIENT
Start: 2023-05-23

## 2023-05-23 RX ORDER — DESVENLAFAXINE 100 MG/1
100 TABLET, EXTENDED RELEASE ORAL DAILY
Qty: 90 TABLET | Refills: 3 | Status: SHIPPED | OUTPATIENT
Start: 2023-05-23

## 2023-05-23 RX ORDER — SUMATRIPTAN 25 MG/1
25 TABLET, FILM COATED ORAL ONCE AS NEEDED
Qty: 10 TABLET | Refills: 6 | Status: SHIPPED | OUTPATIENT
Start: 2023-05-23

## 2023-05-23 RX ORDER — HYDROXYZINE PAMOATE 50 MG/1
50 CAPSULE ORAL NIGHTLY PRN
Qty: 90 CAPSULE | Refills: 3 | Status: SHIPPED | OUTPATIENT
Start: 2023-05-23

## 2023-05-23 RX ORDER — CETIRIZINE HYDROCHLORIDE 10 MG/1
10 TABLET ORAL NIGHTLY
Qty: 90 TABLET | Refills: 3 | Status: SHIPPED | OUTPATIENT
Start: 2023-05-23

## 2023-05-23 RX ORDER — OMEPRAZOLE 20 MG/1
20 CAPSULE, DELAYED RELEASE ORAL DAILY PRN
Qty: 30 CAPSULE | Refills: 12 | Status: SHIPPED | OUTPATIENT
Start: 2023-05-23

## 2023-05-23 RX ORDER — BACLOFEN 10 MG/1
10 TABLET ORAL NIGHTLY PRN
Qty: 30 TABLET | Refills: 6 | Status: SHIPPED | OUTPATIENT
Start: 2023-05-23

## 2023-05-23 NOTE — PROGRESS NOTES
"  Office Visit      Patient Name: Valery Hernandez  : 1995   MRN: [id number]   Care Team: Patient Care Team:  Kellie Rush APRN as PCP - General (Family Medicine)    Chief Complaint  Hypertension (Depression with Anxiety )    Subjective     Subjective      Valery Hernandez presents to [facility] for chronic disease management.   HTN- she is taking her amlodipine as prescribed. She has noted some episodes of dizziness and hypotension since her bariatric surgery. She has successfully lost over 50 pounds thus far.   Denies shortness of breath, chest pain, headaches, vision changes, or swelling of feet.   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 She would like to continue her desvenlafaxine. Uses trazodone PRN for sleep. Denies any suicidal ideations, hypomania, and panic attacks.   Reflux has improved and is not taking Pepcid as much. She takes Pantoprazole a couple times a week as needed. Denies epigastric pain, nausea, vomiting, and blood in stool.   Migraines have improved and has not had once since the beginning of May and relates it to stress. Denies numbness or tingling, confusion, and unilateral weakness.    Objective     Objective   Vital Signs:   /86   Pulse 95   Temp 96.8 °F (36 °C)   Ht 165.1 cm (65\")   Wt 103 kg (226 lb)   SpO2 97%   BMI 37.61 kg/m²     Physical Exam  Vitals and nursing note reviewed.   Constitutional:       General: She is not in acute distress.     Appearance: Normal appearance. She is obese. She is not toxic-appearing.   Eyes:      Pupils: Pupils are equal, round, and reactive to light.   Neck:      Vascular: No carotid bruit.   Cardiovascular:      Rate and Rhythm: Normal rate and regular rhythm.      Heart sounds: Normal heart sounds. No murmur heard.  Pulmonary:      Effort: Pulmonary effort is normal. No respiratory distress.      Breath sounds: Normal breath sounds. No wheezing.   Abdominal:      General: Bowel sounds are " normal. There is no distension.      Palpations: Abdomen is soft.      Tenderness: There is no abdominal tenderness.   Musculoskeletal:      Cervical back: Neck supple. No tenderness.   Skin:     General: Skin is warm and dry.      Findings: No rash.   Neurological:      General: No focal deficit present.      Mental Status: She is alert.   Psychiatric:         Mood and Affect: Mood normal.         Behavior: Behavior normal.          Assessment / Plan      Assessment & Plan   Problem List Items Addressed This Visit        Cardiac and Vasculature    Essential hypertension (Chronic)    Relevant Medications    amLODIPine (NORVASC) 5 MG tablet    metoprolol succinate XL (TOPROL-XL) 100 MG 24 hr tablet    Other Relevant Orders    CBC No Differential    Comprehensive metabolic panel    Vitamin B12    TSH Rfx On Abnormal To Free T4    Episodes of hypotension with related dizziness. Stable in office today. Will decrease amlodipine to 5 mg. Monitor BP at home, salt restriction, exercise as tolerated, and stress management encouraged.       Gastrointestinal Abdominal     Gastroesophageal reflux disease    Relevant Medications    famotidine (Pepcid) 40 MG tablet    omeprazole (priLOSEC) 20 MG capsule    Other Relevant Orders    CBC No Differential    Comprehensive metabolic panel    Vitamin B12    TSH Rfx On Abnormal To Free T4    Avoid eating spicy foods, caffeinated and carbonated beverages, alcohol, and chocolate. Try not to eat or drink within 3 hours of going to bed at night. May elevate the head of the bed. Eat smaller portions. Adhere to medication regimen as prescribed.         Mental Health    Depression with anxiety    Relevant Medications    desvenlafaxine (Pristiq) 100 MG 24 hr tablet    hydrOXYzine pamoate (VISTARIL) 50 MG capsule    traZODone (DESYREL) 150 MG tablet    Other Relevant Orders    CBC No Differential    Comprehensive metabolic panel    Vitamin B12    TSH Rfx On Abnormal To Free T4    Stable. Continue  desvenlafaxine at current dose. Continue healthy diet, exercise, daily sun exposure, sleep hygiene measures, and stress management.    Other Visit Diagnoses     Class 2 severe obesity due to excess calories with serious comorbidity and body mass index (BMI) of 37.0 to 37.9 in adult    -  Primary    Acute midline low back pain without sciatica        Relevant Medications    baclofen (LIORESAL) 10 MG tablet    Other Relevant Orders    CBC No Differential    Comprehensive metabolic panel    Vitamin B12    TSH Rfx On Abnormal To Free T4    Stretching, continued weight loss, as needed baclofen and NSAIDs sparingly.           Class 2 Severe Obesity (BMI >=35 and <=39.9). Obesity-related health conditions include the following: hypertension. Obesity is improving with treatment. BMI is is above average; BMI management plan is completed. We discussed portion control and increasing exercise.    Follow Up   Return in about 6 months (around 11/23/2023) for Annual.  Patient was given instructions and counseling regarding her condition or for health maintenance advice. Please see specific information pulled into the AVS if appropriate.     BRAULIO Soto  [facility] - [place]

## 2023-05-24 LAB
ALBUMIN SERPL-MCNC: 4.3 G/DL (ref 3.5–5.2)
ALBUMIN/GLOB SERPL: 1.8 G/DL
ALP SERPL-CCNC: 90 U/L (ref 39–117)
ALT SERPL-CCNC: 34 U/L (ref 1–33)
AST SERPL-CCNC: 21 U/L (ref 1–32)
BILIRUB SERPL-MCNC: 0.5 MG/DL (ref 0–1.2)
BUN SERPL-MCNC: 11 MG/DL (ref 6–20)
BUN/CREAT SERPL: 15.7 (ref 7–25)
CALCIUM SERPL-MCNC: 9.7 MG/DL (ref 8.6–10.5)
CHLORIDE SERPL-SCNC: 107 MMOL/L (ref 98–107)
CO2 SERPL-SCNC: 25.7 MMOL/L (ref 22–29)
CREAT SERPL-MCNC: 0.7 MG/DL (ref 0.57–1)
EGFRCR SERPLBLD CKD-EPI 2021: 122.5 ML/MIN/1.73
ERYTHROCYTE [DISTWIDTH] IN BLOOD BY AUTOMATED COUNT: 12.1 % (ref 12.3–15.4)
GLOBULIN SER CALC-MCNC: 2.4 GM/DL
GLUCOSE SERPL-MCNC: 86 MG/DL (ref 65–99)
HCT VFR BLD AUTO: 37.8 % (ref 34–46.6)
HGB BLD-MCNC: 12.5 G/DL (ref 12–15.9)
MCH RBC QN AUTO: 29.3 PG (ref 26.6–33)
MCHC RBC AUTO-ENTMCNC: 33.1 G/DL (ref 31.5–35.7)
MCV RBC AUTO: 88.5 FL (ref 79–97)
PLATELET # BLD AUTO: 281 10*3/MM3 (ref 140–450)
POTASSIUM SERPL-SCNC: 4.3 MMOL/L (ref 3.5–5.2)
PROT SERPL-MCNC: 6.7 G/DL (ref 6–8.5)
RBC # BLD AUTO: 4.27 10*6/MM3 (ref 3.77–5.28)
SODIUM SERPL-SCNC: 141 MMOL/L (ref 136–145)
TSH SERPL DL<=0.005 MIU/L-ACNC: 1.78 UIU/ML (ref 0.27–4.2)
VIT B12 SERPL-MCNC: 677 PG/ML (ref 211–946)
WBC # BLD AUTO: 6.33 10*3/MM3 (ref 3.4–10.8)

## 2023-11-30 ENCOUNTER — OFFICE VISIT (OUTPATIENT)
Dept: OBSTETRICS AND GYNECOLOGY | Facility: CLINIC | Age: 28
End: 2023-11-30
Payer: COMMERCIAL

## 2023-11-30 VITALS
HEIGHT: 65 IN | WEIGHT: 209.8 LBS | SYSTOLIC BLOOD PRESSURE: 118 MMHG | DIASTOLIC BLOOD PRESSURE: 82 MMHG | BODY MASS INDEX: 34.95 KG/M2

## 2023-11-30 DIAGNOSIS — Z30.011 ENCOUNTER FOR INITIAL PRESCRIPTION OF CONTRACEPTIVE PILLS: ICD-10-CM

## 2023-11-30 DIAGNOSIS — Z30.46 NEXPLANON REMOVAL: Primary | ICD-10-CM

## 2023-11-30 RX ORDER — NORGESTIMATE AND ETHINYL ESTRADIOL 7DAYSX3 28
1 KIT ORAL DAILY
Qty: 28 TABLET | Refills: 12 | Status: SHIPPED | OUTPATIENT
Start: 2023-11-30 | End: 2024-11-29

## 2023-11-30 RX ORDER — LEVONORGESTREL 19.5 MG/1
1 INTRAUTERINE DEVICE INTRAUTERINE ONCE
Qty: 1 EACH | Refills: 0 | Status: SHIPPED | OUTPATIENT
Start: 2023-11-30 | End: 2023-12-01

## 2023-11-30 NOTE — PATIENT INSTRUCTIONS
May start ocp today. Take ocp consistently. Not effective for contraception for first pack  Kyleena ordered

## 2023-11-30 NOTE — PROGRESS NOTES
Subjective   Chief Complaint   Patient presents with    Gynecologic Exam     Nexplanon removal/Birth control consult.        Valery Hernandez is a 28 y.o. year old  presenting to be seen for Nexplanon removal  Nexplanon inserted 2022. Did well the first 6 months but has had prolonged bleeding past several months lasting up to 3 weeks at a time. Wants nexplanon removed and to go back to Tri-sprintec ocp for now but wanting IUD long term    Past Medical History:   Diagnosis Date    Anemia     Anxiety     Depression     Elevated ALT measurement     Elevated HDL     Elevated hemoglobin A1c     GERD (gastroesophageal reflux disease)     pepcid daily controls symptoms, h pylori antibodies + with treatment, no prior EGD    H. pylori infection 2021    Hiatal hernia with gastroesophageal reflux     EGD Dr. Viveros 2021    Hypertension     Joint pain     not treated with meds    Migraine     takes sumatriptan prn    Polycystic ovary syndrome     Prediabetes 2021    S/P dorothy     acalculous    Urinary tract infection     Vitamin D deficiency 2021    Wears glasses         Current Outpatient Medications:     amLODIPine (NORVASC) 5 MG tablet, Take 1 tablet by mouth Daily., Disp: 90 tablet, Rfl: 3    baclofen (LIORESAL) 10 MG tablet, Take 1 tablet by mouth At Night As Needed for Muscle Spasms., Disp: 30 tablet, Rfl: 6    cetirizine (zyrTEC) 10 MG tablet, Take 1 tablet by mouth Every Night., Disp: 90 tablet, Rfl: 3    desvenlafaxine (Pristiq) 100 MG 24 hr tablet, Take 1 tablet by mouth Daily., Disp: 90 tablet, Rfl: 3    famotidine (Pepcid) 40 MG tablet, Take 1 tablet by mouth 2 (Two) Times a Day As Needed for Indigestion or Heartburn., Disp: 180 tablet, Rfl: 3    fluticasone (Flonase) 50 MCG/ACT nasal spray, 2 sprays into the nostril(s) as directed by provider Daily., Disp: 18.2 mL, Rfl: 11    hydrOXYzine pamoate (VISTARIL) 50 MG capsule, Take 1 capsule by mouth At Night As Needed for Anxiety  (sleep)., Disp: 90 capsule, Rfl: 3    metoprolol succinate XL (TOPROL-XL) 100 MG 24 hr tablet, Take 1 tablet by mouth Daily., Disp: 90 tablet, Rfl: 3    montelukast (SINGULAIR) 10 MG tablet, Take 1 tablet by mouth Every Night., Disp: 90 tablet, Rfl: 3    NON FORMULARY, Bariatric Vitamins  Multi B12 B1 Vit D Calcium, Disp: , Rfl:     omeprazole (priLOSEC) 20 MG capsule, Take 1 capsule by mouth Daily As Needed (heartburn)., Disp: 30 capsule, Rfl: 12    SUMAtriptan (IMITREX) 25 MG tablet, Take 1 tablet by mouth 1 (One) Time As Needed for Migraine., Disp: 10 tablet, Rfl: 6    traZODone (DESYREL) 150 MG tablet, Take 1 tablet by mouth At Night As Needed for Sleep. PRN, Disp: 90 tablet, Rfl: 3    levonorgestrel (Kyleena) 19.5 MG intrauterine device IUD, 1 each by Intrauterine route 1 (One) Time for 1 dose., Disp: 1 each, Rfl: 0    norgestimate-ethinyl estradiol (Tri-Sprintec) 0.18/0.215/0.25 MG-35 MCG per tablet, Take 1 tablet by mouth Daily., Disp: 28 tablet, Rfl: 12   Allergies   Allergen Reactions    Prozac [Fluoxetine] Myalgia    Metformin GI Intolerance    Topamax [Topiramate] Rash     Rash, tingling and numnbess in bilateral feet.       Past Surgical History:   Procedure Laterality Date    ENDOSCOPY      ENDOSCOPY N/A 5/5/2022    Procedure: ESOPHAGOGASTRODUODENOSCOPY;  Surgeon: Junaid Viveros MD;  Location:  [facility];  Service: Bariatric;  Laterality: N/A;    GASTRECTOMY N/A 5/5/2022    Procedure: GASTRECTOMY LAPAROSCOPIC;  Surgeon: Junaid Viveros MD;  Location:  [facility];  Service: Bariatric;  Laterality: N/A;    GASTRIC SLEEVE LAPAROSCOPIC      HIATAL HERNIA REPAIR N/A 5/5/2022    Procedure: HIATAL HERNIA REPAIR LAPAROSCOPIC;  Surgeon: Junaid Viveros MD;  Location:  [facility];  Service: Bariatric;  Laterality: N/A;    LAPAROSCOPIC CHOLECYSTECTOMY  2016    TONSILLECTOMY  2003    WISDOM TOOTH EXTRACTION  2014      Social History     Socioeconomic History    Marital status: Single   Tobacco Use     "Smoking status: Never    Smokeless tobacco: Never   Vaping Use    Vaping Use: Never used   Substance and Sexual Activity    Alcohol use: Yes     Comment: social    Drug use: No    Sexual activity: Yes     Partners: Female     Birth control/protection: Nexplanon      Family History   Problem Relation Age of Onset    Hyperlipidemia Father     Hypertension Father     Obesity Father     Hypertension Paternal Grandfather     Obesity Paternal Grandfather     Heart disease Paternal Grandmother     Hyperlipidemia Paternal Grandmother     Obesity Paternal Grandmother     Diabetes Paternal Grandmother     Arthritis Other     Diabetes Other     Heart attack Other     Hypertension Other     Hyperlipidemia Other     Obesity Other     Obesity Mother     Obesity Maternal Grandmother     Obesity Maternal Grandfather        Review of Systems   Constitutional:  Negative for chills, diaphoresis and fever.   Gastrointestinal: Negative.    Genitourinary:  Positive for menstrual problem.           Objective   /82   Ht 165.1 cm (65\")   Wt 95.2 kg (209 lb 12.8 oz)   BMI 34.91 kg/m²     Physical Exam  Constitutional:       Appearance: Normal appearance. She is well-developed and well-groomed.   Eyes:      General: Lids are normal.      Extraocular Movements: Extraocular movements intact.      Conjunctiva/sclera: Conjunctivae normal.   Neurological:      General: No focal deficit present.      Mental Status: She is alert and oriented to person, place, and time.   Psychiatric:         Attention and Perception: Attention normal.         Mood and Affect: Mood normal.         Speech: Speech normal.         Behavior: Behavior is cooperative.            Result Review :           LIQUID-BASED PAP SMEAR, [facility] ([facility]) (11/30/2022 15:43)         Assessment and Plan  Diagnoses and all orders for this visit:    1. Nexplanon removal (Primary)    2. Encounter for initial prescription of contraceptive pills    Other orders  -     " norgestimate-ethinyl estradiol (Tri-Sprintec) 0.18/0.215/0.25 MG-35 MCG per tablet; Take 1 tablet by mouth Daily.  Dispense: 28 tablet; Refill: 12  -     levonorgestrel (Kyleena) 19.5 MG intrauterine device IUD; 1 each by Intrauterine route 1 (One) Time for 1 dose.  Dispense: 1 each; Refill: 0      Patient Instructions   May start ocp today. Take ocp consistently. Not effective for contraception for first pack  Kyleena ordered           This note was electronically signed.    Tori Hernandes PA-C   November 30, 2023

## 2024-02-27 ENCOUNTER — OFFICE VISIT (OUTPATIENT)
Dept: OBSTETRICS AND GYNECOLOGY | Facility: CLINIC | Age: 29
End: 2024-02-27
Payer: COMMERCIAL

## 2024-02-27 VITALS
DIASTOLIC BLOOD PRESSURE: 80 MMHG | HEIGHT: 65 IN | WEIGHT: 208 LBS | BODY MASS INDEX: 34.66 KG/M2 | SYSTOLIC BLOOD PRESSURE: 118 MMHG

## 2024-02-27 DIAGNOSIS — Z30.430 ENCOUNTER FOR IUD INSERTION: Primary | ICD-10-CM

## 2024-02-27 LAB
B-HCG UR QL: NEGATIVE
EXPIRATION DATE: NORMAL
INTERNAL NEGATIVE CONTROL: NORMAL
INTERNAL POSITIVE CONTROL: NORMAL
Lab: NORMAL

## 2024-02-28 ENCOUNTER — OFFICE VISIT (OUTPATIENT)
Dept: INTERNAL MEDICINE | Facility: CLINIC | Age: 29
End: 2024-02-28
Payer: COMMERCIAL

## 2024-02-28 VITALS
OXYGEN SATURATION: 100 % | BODY MASS INDEX: 34.82 KG/M2 | DIASTOLIC BLOOD PRESSURE: 85 MMHG | TEMPERATURE: 98.6 F | SYSTOLIC BLOOD PRESSURE: 124 MMHG | WEIGHT: 209 LBS | HEIGHT: 65 IN | HEART RATE: 80 BPM

## 2024-02-28 DIAGNOSIS — J30.89 ALLERGIC RHINITIS DUE TO OTHER ALLERGIC TRIGGER, UNSPECIFIED SEASONALITY: ICD-10-CM

## 2024-02-28 DIAGNOSIS — L29.9 PRURITUS: ICD-10-CM

## 2024-02-28 DIAGNOSIS — I10 ESSENTIAL HYPERTENSION: Primary | ICD-10-CM

## 2024-02-28 PROCEDURE — 3074F SYST BP LT 130 MM HG: CPT | Performed by: NURSE PRACTITIONER

## 2024-02-28 PROCEDURE — 3079F DIAST BP 80-89 MM HG: CPT | Performed by: NURSE PRACTITIONER

## 2024-02-28 PROCEDURE — 1159F MED LIST DOCD IN RCRD: CPT | Performed by: NURSE PRACTITIONER

## 2024-02-28 PROCEDURE — 1160F RVW MEDS BY RX/DR IN RCRD: CPT | Performed by: NURSE PRACTITIONER

## 2024-02-28 PROCEDURE — 99213 OFFICE O/P EST LOW 20 MIN: CPT | Performed by: NURSE PRACTITIONER

## 2024-02-28 RX ORDER — AZELASTINE 1 MG/ML
2 SPRAY, METERED NASAL 2 TIMES DAILY
Qty: 30 ML | Refills: 12 | Status: SHIPPED | OUTPATIENT
Start: 2024-02-28

## 2024-02-28 RX ORDER — IBUPROFEN 800 MG/1
800 TABLET ORAL EVERY 6 HOURS PRN
Qty: 40 TABLET | Refills: 3 | Status: SHIPPED | OUTPATIENT
Start: 2024-02-28

## 2024-02-28 RX ORDER — LEVOCETIRIZINE DIHYDROCHLORIDE 5 MG/1
5 TABLET, FILM COATED ORAL EVERY EVENING
Qty: 30 TABLET | Refills: 3 | Status: SHIPPED | OUTPATIENT
Start: 2024-02-28

## 2024-02-28 NOTE — PROGRESS NOTES
"  Office Visit      Patient Name: Valery Hernandez  : 1995   MRN: [id number]   Care Team: Patient Care Team:  Kellie Rush APRN as PCP - General (Family Medicine)    Chief Complaint  Hypertension (Routine follow up.  Also wants to discuss her allergy medication and increasing the dose. ) and Med Refill (Requesting rx on Ibuprofen . )    Subjective     Subjective      Valery Hernandez presents to [facility] for hypertension follow-up and allergy problem.   HTN- she stopped taking her amlodipine and metoprolol around 2 months ago. She was actually passing out at work and felt her blood pressure was bottoming out- felt flushed, dizzy, and checked her BP which was less than 100/60. Denies palpitations, chest pain, lower extremity swelling, dizziness.   Her blood pressure readings at home now are around 120/80.  Allergies- taking singulair and cetrizine as prescribed. She complains of extreme pruritus of the lower extremities, feet, ears, ears feel like they are itching, nose bleeds.  Denies sore throat, rhinorrhea, nasal congestion.  Allergy testing revealed ragweed allergy as a teenager but nothing else.     Objective     Objective   Vital Signs:   /85   Pulse 80   Temp 98.6 °F (37 °C) (Tympanic)   Ht 165.1 cm (65\")   Wt 94.8 kg (209 lb)   SpO2 100%   BMI 34.78 kg/m²     Physical Exam  Vitals and nursing note reviewed.   Constitutional:       General: She is not in acute distress.     Appearance: Normal appearance. She is not ill-appearing or toxic-appearing.   HENT:      Right Ear: Ear canal normal. A middle ear effusion (dull) is present. Tympanic membrane is bulging. Tympanic membrane is not erythematous.      Left Ear: Ear canal normal. A middle ear effusion (dull) is present. Tympanic membrane is not erythematous or bulging.      Nose: Nose normal. Rhinorrhea present. No congestion.      Right Sinus: No maxillary sinus tenderness or frontal sinus tenderness.      " Left Sinus: No maxillary sinus tenderness or frontal sinus tenderness.      Mouth/Throat:      Mouth: Mucous membranes are moist.      Pharynx: No posterior oropharyngeal erythema.   Eyes:      Pupils: Pupils are equal, round, and reactive to light.   Cardiovascular:      Rate and Rhythm: Normal rate and regular rhythm.      Heart sounds: Normal heart sounds. No murmur heard.  Pulmonary:      Effort: Pulmonary effort is normal. No respiratory distress.      Breath sounds: Normal breath sounds. No wheezing.   Abdominal:      General: Bowel sounds are normal. There is no distension.      Palpations: Abdomen is soft.      Tenderness: There is no abdominal tenderness.   Musculoskeletal:      Cervical back: Neck supple. No tenderness.   Lymphadenopathy:      Head:      Right side of head: No submental, submandibular or tonsillar adenopathy.      Left side of head: No submental, submandibular or tonsillar adenopathy.      Cervical: No cervical adenopathy.   Skin:     General: Skin is warm and dry.      Findings: No rash.   Neurological:      General: No focal deficit present.      Mental Status: She is alert.   Psychiatric:         Mood and Affect: Mood normal.         Behavior: Behavior normal.       Assessment / Plan      Assessment & Plan   Problem List Items Addressed This Visit       Essential hypertension - Primary (Chronic)    Stable with lifestyle modification, continue holding medications. DASH diet, exercise, stress management, and intermittent home monitoring.      Other Visit Diagnoses       Allergic rhinitis due to other allergic trigger, unspecified seasonality        Relevant Medications    ibuprofen (ADVIL,MOTRIN) 800 MG tablet    Add astelin to fluticasone, saline nose spray PRN, humidifier in the bedroom, decrease shower temperature, change cetrizine to levocetrizine, and continue singulair. Consider allergy testing if not beneficial.     Pruritus      See above. Recommend OTC cervae, avoid harsh  fragrances, and humidifier.             Follow Up   Return in about 6 months (around 8/28/2024) for Annual.  Patient was given instructions and counseling regarding her condition or for health maintenance advice. Please see specific information pulled into the AVS if appropriate.     BRAULIO Soto  [facility]

## 2024-03-03 NOTE — PROGRESS NOTES
IUD Insertion    No LMP recorded.    Date of procedure:  02/27/2024    Risks and benefits discussed? yes  All questions answered? yes  Consents given by The patient  Written consent obtained? yes    Local anesthesia used:  no    Procedure documentation:    After verifying the patient had a low probability of being pregnant and met the criteria for insertion, a sterile speculum has placed and the cervix was cleansed with an antiseptic solution.  Vaginal discharge was scant.  The anterior lip of the cervix was grasped with a long Allis clamp and the uterine cavity was gently sounded. There was mild difficulty passing the sound through the cervix.  Cervical dilation did not need to be performed prior to placing the IUD.  The uterus was anteverted and sounded to 7 cms.  The Kyleena was then prepared per the manufacturers instructions.    The Kyleena was advanced to a point 2 cms from the fundus and then the arms were released from the sheath.  The device was advanced to the fundus and the device was released fully from the sheath.. The string was cut 3 cms in length.  Bleeding from the cervix was scant.    She tolerated the procedure without any difficulty.     Post procedure instructions: It was reviewed that the Kyleena will not alter the timing of when she bleeds but it may decrease the quantity of flow and cramps.  Roughly 30% of people will be amenorrheic over time.  Efficacy rate of 99.2% over 5 years was discussed.  Spontaneous expulsion rate of 1-2% was also discussed.  If she has any issue with fever or excessive bleeding or pain she is to call the office immediately.  Otherwise I would like to see her back in 5 weeks for f/u appt.    Obed Velez MD  Obstetrics and Gynecology  [facility]

## 2024-04-01 DIAGNOSIS — M54.50 ACUTE MIDLINE LOW BACK PAIN WITHOUT SCIATICA: ICD-10-CM

## 2024-04-01 DIAGNOSIS — F41.8 DEPRESSION WITH ANXIETY: ICD-10-CM

## 2024-04-01 RX ORDER — DESVENLAFAXINE 100 MG/1
100 TABLET, EXTENDED RELEASE ORAL DAILY
Qty: 90 TABLET | Refills: 3 | Status: SHIPPED | OUTPATIENT
Start: 2024-04-01

## 2024-04-01 RX ORDER — BACLOFEN 10 MG/1
10 TABLET ORAL NIGHTLY PRN
Qty: 30 TABLET | Refills: 6 | Status: SHIPPED | OUTPATIENT
Start: 2024-04-01

## 2024-04-01 RX ORDER — MONTELUKAST SODIUM 10 MG/1
10 TABLET ORAL NIGHTLY
Qty: 90 TABLET | Refills: 0 | Status: SHIPPED | OUTPATIENT
Start: 2024-04-01

## 2024-04-01 RX ORDER — DESVENLAFAXINE 100 MG/1
TABLET, EXTENDED RELEASE ORAL DAILY
Qty: 90 TABLET | Refills: 0 | OUTPATIENT
Start: 2024-04-01

## 2024-04-01 NOTE — TELEPHONE ENCOUNTER
Caller: Valery Hernandez    Relationship: Self    Best call back number: [phone]     Requested Prescriptions:   Requested Prescriptions     Pending Prescriptions Disp Refills    desvenlafaxine (Pristiq) 100 MG 24 hr tablet 90 tablet 3     Sig: Take 1 tablet by mouth Daily.    baclofen (LIORESAL) 10 MG tablet 30 tablet 6     Sig: Take 1 tablet by mouth At Night As Needed for Muscle Spasms.        Pharmacy where request should be sent: [address] [phone] [facility] [phone]      Last office visit with prescribing clinician: 2/28/2024   Last telemedicine visit with prescribing clinician: Visit date not found   Next office visit with prescribing clinician: 8/28/2024     Additional details provided by patient: PT IS OUT OF BACLOFEN. PT HAS A COUPLE OF THE PRESTIQ LEFT. PT WAS WANTING TO KNOW WHY THE PRESTIQ WAS DENIED.    Does the patient have less than a 3 day supply:  [x] Yes  [] No    Would you like a call back once the refill request has been completed: [] Yes [x] No    If the office needs to give you a call back, can they leave a voicemail: [] Yes [x] No    Sterling Mcclendon Rep   04/01/24 11:14 EDT

## 2024-04-02 ENCOUNTER — OFFICE VISIT (OUTPATIENT)
Dept: OBSTETRICS AND GYNECOLOGY | Facility: CLINIC | Age: 29
End: 2024-04-02
Payer: COMMERCIAL

## 2024-04-02 VITALS
BODY MASS INDEX: 35.16 KG/M2 | DIASTOLIC BLOOD PRESSURE: 86 MMHG | WEIGHT: 211 LBS | SYSTOLIC BLOOD PRESSURE: 126 MMHG | HEIGHT: 65 IN

## 2024-04-02 DIAGNOSIS — Z97.5 IUD (INTRAUTERINE DEVICE) IN PLACE: Primary | ICD-10-CM

## 2024-04-02 PROCEDURE — 99212 OFFICE O/P EST SF 10 MIN: CPT | Performed by: OBSTETRICS & GYNECOLOGY

## 2024-04-02 PROCEDURE — 3074F SYST BP LT 130 MM HG: CPT | Performed by: OBSTETRICS & GYNECOLOGY

## 2024-04-02 PROCEDURE — 3079F DIAST BP 80-89 MM HG: CPT | Performed by: OBSTETRICS & GYNECOLOGY

## 2024-04-03 PROBLEM — Z97.5 IUD (INTRAUTERINE DEVICE) IN PLACE: Status: ACTIVE | Noted: 2024-04-03

## 2024-04-03 NOTE — PROGRESS NOTES
"IUD String Check    Chief Complaint   Patient presents with    Follow-up     IUD check, irregular bleeding.       29 y.o.  female who presents for an IUD string check.    She has no complaints today. She has irregular bleeding. She has no pain symptoms.    Vitals:    24 1433   BP: 126/86   Weight: 95.7 kg (211 lb)   Height: 165.1 cm (65\")       PHYSICAL EXAM   General appearance: well nourished, well hydrated, no acute distress  Cervix: Normal appearance, IUD strings present    IMPRESSION/PLAN:    IUD in appropriate position    RTC as needed or for annual visits    Coding/billing based on time: 10 total minutes were required for this encounter.    Obed Velez MD  Obstetrics and Gynecology  [facility]   "

## 2024-05-29 DIAGNOSIS — K21.9 GASTROESOPHAGEAL REFLUX DISEASE, UNSPECIFIED WHETHER ESOPHAGITIS PRESENT: ICD-10-CM

## 2024-05-29 DIAGNOSIS — F41.8 DEPRESSION WITH ANXIETY: ICD-10-CM

## 2024-05-29 RX ORDER — FLUTICASONE PROPIONATE 50 MCG
2 SPRAY, SUSPENSION (ML) NASAL DAILY
Qty: 48 G | Refills: 0 | Status: SHIPPED | OUTPATIENT
Start: 2024-05-29

## 2024-05-29 RX ORDER — OMEPRAZOLE 20 MG/1
CAPSULE, DELAYED RELEASE ORAL
Qty: 30 CAPSULE | Refills: 0 | Status: SHIPPED | OUTPATIENT
Start: 2024-05-29

## 2024-05-29 RX ORDER — HYDROXYZINE PAMOATE 50 MG/1
50 CAPSULE ORAL NIGHTLY PRN
Qty: 30 CAPSULE | Refills: 0 | Status: SHIPPED | OUTPATIENT
Start: 2024-05-29

## 2024-06-27 ENCOUNTER — HOSPITAL ENCOUNTER (EMERGENCY)
Facility: HOSPITAL | Age: 29
Discharge: HOME | End: 2024-06-27
Attending: STUDENT IN AN ORGANIZED HEALTH CARE EDUCATION/TRAINING PROGRAM
Payer: OTHER MISCELLANEOUS

## 2024-06-27 VITALS
HEART RATE: 68 BPM | RESPIRATION RATE: 12 BRPM | BODY MASS INDEX: 35.49 KG/M2 | WEIGHT: 213 LBS | OXYGEN SATURATION: 99 % | DIASTOLIC BLOOD PRESSURE: 99 MMHG | TEMPERATURE: 98.4 F | HEIGHT: 65 IN | SYSTOLIC BLOOD PRESSURE: 146 MMHG

## 2024-06-27 DIAGNOSIS — M54.9 UPPER BACK PAIN: ICD-10-CM
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
## 2024-06-27 DIAGNOSIS — R51.9 ACUTE NONINTRACTABLE HEADACHE, UNSPECIFIED HEADACHE TYPE: ICD-10-CM

## 2024-06-27 PROCEDURE — 96372 THER/PROPH/DIAG INJ SC/IM: CPT

## 2024-06-27 PROCEDURE — 25010000002 KETOROLAC TROMETHAMINE PER 15 MG

## 2024-06-27 PROCEDURE — 99282 EMERGENCY DEPT VISIT SF MDM: CPT

## 2024-06-27 RX ORDER — KETOROLAC TROMETHAMINE 30 MG/ML
30 INJECTION, SOLUTION INTRAMUSCULAR; INTRAVENOUS ONCE
Status: COMPLETED | OUTPATIENT
Start: 2024-06-27 | End: 2024-06-27

## 2024-06-27 RX ORDER — METHOCARBAMOL 500 MG/1
500 TABLET, FILM COATED ORAL 4 TIMES DAILY
Qty: 12 TABLET | Refills: 0 | Status: SHIPPED | OUTPATIENT
Start: 2024-06-27 | End: 2024-06-30

## 2024-06-27 RX ORDER — MELOXICAM 7.5 MG/1
7.5 TABLET ORAL DAILY
Qty: 7 TABLET | Refills: 0 | Status: SHIPPED | OUTPATIENT
Start: 2024-06-27 | End: 2024-07-04

## 2024-06-27 RX ADMIN — KETOROLAC TROMETHAMINE 30 MG: 30 INJECTION, SOLUTION INTRAMUSCULAR at 19:14

## 2024-06-27 NOTE — Clinical Note
[facility] EMERGENCY DEPARTMENT  [address] [address]  Phone: [phone]    Valery Hernandez was seen and treated in our emergency department on 6/27/2024.  She may return to work on 06/30/2024.         Thank you for choosing [facility].    Barrington Sawyer PA-C
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
## 2024-08-28 ENCOUNTER — OFFICE VISIT (OUTPATIENT)
Dept: INTERNAL MEDICINE | Facility: CLINIC | Age: 29
End: 2024-08-28
Payer: COMMERCIAL

## 2024-08-28 VITALS
TEMPERATURE: 97.8 F | BODY MASS INDEX: 35.99 KG/M2 | WEIGHT: 216 LBS | DIASTOLIC BLOOD PRESSURE: 70 MMHG | SYSTOLIC BLOOD PRESSURE: 121 MMHG | HEART RATE: 72 BPM | OXYGEN SATURATION: 94 % | RESPIRATION RATE: 18 BRPM | HEIGHT: 65 IN

## 2024-08-28 DIAGNOSIS — I10 ESSENTIAL HYPERTENSION: ICD-10-CM

## 2024-08-28 DIAGNOSIS — Z90.3 H/O GASTRIC SLEEVE: ICD-10-CM

## 2024-08-28 DIAGNOSIS — Z00.00 ANNUAL PHYSICAL EXAM: Primary | ICD-10-CM

## 2024-08-28 DIAGNOSIS — K21.9 GASTROESOPHAGEAL REFLUX DISEASE, UNSPECIFIED WHETHER ESOPHAGITIS PRESENT: ICD-10-CM

## 2024-08-28 DIAGNOSIS — F41.8 DEPRESSION WITH ANXIETY: ICD-10-CM

## 2024-08-28 DIAGNOSIS — E66.01 CLASS 2 SEVERE OBESITY DUE TO EXCESS CALORIES WITH SERIOUS COMORBIDITY AND BODY MASS INDEX (BMI) OF 35.0 TO 35.9 IN ADULT: ICD-10-CM

## 2024-08-28 DIAGNOSIS — J30.89 ALLERGIC RHINITIS DUE TO OTHER ALLERGIC TRIGGER, UNSPECIFIED SEASONALITY: ICD-10-CM

## 2024-08-28 PROCEDURE — 99395 PREV VISIT EST AGE 18-39: CPT | Performed by: NURSE PRACTITIONER

## 2024-08-28 RX ORDER — LEVOCETIRIZINE DIHYDROCHLORIDE 5 MG/1
5 TABLET, FILM COATED ORAL EVERY EVENING
Qty: 90 TABLET | Refills: 3 | Status: SHIPPED | OUTPATIENT
Start: 2024-08-28

## 2024-08-28 RX ORDER — MONTELUKAST SODIUM 10 MG/1
10 TABLET ORAL NIGHTLY
Qty: 90 TABLET | Refills: 3 | Status: SHIPPED | OUTPATIENT
Start: 2024-08-28

## 2024-08-28 RX ORDER — HYDROXYZINE PAMOATE 50 MG/1
50 CAPSULE ORAL NIGHTLY PRN
Qty: 90 CAPSULE | Refills: 3 | Status: SHIPPED | OUTPATIENT
Start: 2024-08-28
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
## 2024-08-30 DIAGNOSIS — D64.9 ANEMIA, UNSPECIFIED TYPE: Primary | ICD-10-CM

## 2024-08-30 LAB
ALBUMIN SERPL-MCNC: 3.8 G/DL (ref 3.5–5.2)
ALBUMIN/GLOB SERPL: 1.7 G/DL
ALP SERPL-CCNC: 80 U/L (ref 39–117)
ALT SERPL-CCNC: 48 U/L (ref 1–33)
AST SERPL-CCNC: 23 U/L (ref 1–32)
BILIRUB SERPL-MCNC: 0.3 MG/DL (ref 0–1.2)
BUN SERPL-MCNC: 10 MG/DL (ref 6–20)
BUN/CREAT SERPL: 13.7 (ref 7–25)
CALCIUM SERPL-MCNC: 9.2 MG/DL (ref 8.6–10.5)
CHLORIDE SERPL-SCNC: 108 MMOL/L (ref 98–107)
CHOLEST SERPL-MCNC: 144 MG/DL (ref 0–200)
CO2 SERPL-SCNC: 27.2 MMOL/L (ref 22–29)
CREAT SERPL-MCNC: 0.73 MG/DL (ref 0.57–1)
EGFRCR SERPLBLD CKD-EPI 2021: 115.8 ML/MIN/1.73
ERYTHROCYTE [DISTWIDTH] IN BLOOD BY AUTOMATED COUNT: 11.8 % (ref 12.3–15.4)
FERRITIN SERPL-MCNC: 101 NG/ML (ref 13–150)
GLOBULIN SER CALC-MCNC: 2.2 GM/DL
GLUCOSE SERPL-MCNC: 86 MG/DL (ref 65–99)
HCT VFR BLD AUTO: 33.8 % (ref 34–46.6)
HDLC SERPL-MCNC: 57 MG/DL (ref 40–60)
HGB BLD-MCNC: 11.1 G/DL (ref 12–15.9)
IRON SATN MFR SERPL: 20 % (ref 20–50)
IRON SERPL-MCNC: 77 MCG/DL (ref 37–145)
LDLC SERPL CALC-MCNC: 76 MG/DL (ref 0–100)
MCH RBC QN AUTO: 28.6 PG (ref 26.6–33)
MCHC RBC AUTO-ENTMCNC: 32.8 G/DL (ref 31.5–35.7)
MCV RBC AUTO: 87.1 FL (ref 79–97)
PLATELET # BLD AUTO: 242 10*3/MM3 (ref 140–450)
POTASSIUM SERPL-SCNC: 3.8 MMOL/L (ref 3.5–5.2)
PROT SERPL-MCNC: 6 G/DL (ref 6–8.5)
RBC # BLD AUTO: 3.88 10*6/MM3 (ref 3.77–5.28)
SODIUM SERPL-SCNC: 143 MMOL/L (ref 136–145)
TIBC SERPL-MCNC: 384 MCG/DL
TRIGL SERPL-MCNC: 50 MG/DL (ref 0–150)
TSH SERPL DL<=0.005 MIU/L-ACNC: 1.6 UIU/ML (ref 0.27–4.2)
UIBC SERPL-MCNC: 307 MCG/DL (ref 112–346)
VIT B12 SERPL-MCNC: 527 PG/ML (ref 211–946)
VLDLC SERPL CALC-MCNC: 11 MG/DL (ref 5–40)
WBC # BLD AUTO: 5.88 10*3/MM3 (ref 3.4–10.8)

## 2025-01-06 NOTE — PROGRESS NOTES
Nexplanon Removal    Date of procedure:  11/30/2023    Risks and benefits discussed? yes  All questions answered? yes  Consents given by the patient  Written consent obtained? yes  Reason for removal: Side effect: bleeding    Local anesthesia used:  yes - 1 cc's of local anesthesia: 1% lidocaine with epinephrine    Procedure documentation:    The upper left arm was marked at the intended site of removal.  Betadine was used to cleanse the skin.  Local anesthesia was injected.  A vertical incision was created at the distal tip of the implant.  The implant was removed intact without difficulty.  Steri-strips were then placed across the site of insertion and the arm was wrapped.    She tolerated the procedure well.  There were no complications.  EBL was minimal.    Post procedure instructions: Remove the wrapping in 24 hours and the steri-strips in 5 days.    Follow up needed: PRN    This note was electronically signed.    Tori Hernandes PA-C.  November 30, 2023       Surgery Instructions:     Your surgery is scheduled on 1/30/2025 at the surgery center: [address], 1st floor, second entrance.    The pre-op department will be in contact with you prior to your procedure to review medications and instructions.       Nothing to eat or drink after midnight prior to day of surgery.    You should STOP taking any blood thinners 5 days prior to surgery.     The surgery center will contact you the day prior to surgery to advise you of your arrival time for surgery.     Your post op appointment is scheduled on 2/14 at 10:00.

## 2025-02-17 ENCOUNTER — PATIENT ROUNDING (BHMG ONLY) (OUTPATIENT)
Dept: URGENT CARE | Facility: CLINIC | Age: 30
End: 2025-02-17
Payer: COMMERCIAL

## 2025-03-11 ENCOUNTER — OFFICE VISIT (OUTPATIENT)
Dept: INTERNAL MEDICINE | Facility: CLINIC | Age: 30
End: 2025-03-11
Payer: COMMERCIAL

## 2025-03-11 VITALS
TEMPERATURE: 98.1 F | WEIGHT: 205 LBS | RESPIRATION RATE: 16 BRPM | OXYGEN SATURATION: 98 % | DIASTOLIC BLOOD PRESSURE: 83 MMHG | BODY MASS INDEX: 34.16 KG/M2 | HEIGHT: 65 IN | HEART RATE: 90 BPM | SYSTOLIC BLOOD PRESSURE: 122 MMHG

## 2025-03-11 DIAGNOSIS — F41.8 DEPRESSION WITH ANXIETY: ICD-10-CM

## 2025-03-11 DIAGNOSIS — J30.89 ALLERGIC RHINITIS DUE TO OTHER ALLERGIC TRIGGER, UNSPECIFIED SEASONALITY: ICD-10-CM

## 2025-03-11 PROCEDURE — 99213 OFFICE O/P EST LOW 20 MIN: CPT | Performed by: NURSE PRACTITIONER

## 2025-03-11 PROCEDURE — 96127 BRIEF EMOTIONAL/BEHAV ASSMT: CPT | Performed by: NURSE PRACTITIONER

## 2025-03-11 RX ORDER — CETIRIZINE HYDROCHLORIDE 10 MG/1
10 TABLET ORAL DAILY
Qty: 90 TABLET | Refills: 3 | Status: SHIPPED | OUTPATIENT
Start: 2025-03-11

## 2025-03-11 RX ORDER — FLUTICASONE PROPIONATE 50 MCG
2 SPRAY, SUSPENSION (ML) NASAL DAILY
Qty: 48 G | Refills: 3 | Status: SHIPPED | OUTPATIENT
Start: 2025-03-11

## 2025-03-11 RX ORDER — DESVENLAFAXINE 100 MG/1
100 TABLET, EXTENDED RELEASE ORAL DAILY
Qty: 90 TABLET | Refills: 3 | Status: SHIPPED | OUTPATIENT
Start: 2025-03-11

## 2025-03-11 RX ORDER — MONTELUKAST SODIUM 10 MG/1
10 TABLET ORAL NIGHTLY
Qty: 90 TABLET | Refills: 3 | Status: SHIPPED | OUTPATIENT
Start: 2025-03-11

## 2025-03-11 RX ORDER — IBUPROFEN 800 MG/1
800 TABLET, FILM COATED ORAL EVERY 6 HOURS PRN
Qty: 40 TABLET | Refills: 3 | Status: SHIPPED | OUTPATIENT
Start: 2025-03-11

## 2025-03-11 NOTE — PROGRESS NOTES
"  Office Visit      Patient Name: Valery Hernandez  : 1995   MRN: [id number]   Care Team: Patient Care Team:  Kellie Rush APRN as PCP - General (Family Medicine)    Chief Complaint  Anxiety (Follow-up)    Subjective     Subjective      Valery Hernandez presents to [facility] for anxiety follow up.    Doing well on medication, states she still has some anxiety but is managing it well, and has lessened from what it was 6 months ago. Taking desvenlfaxine as prescribed and hydroxyzine nightly for sleep.   In the last 6 months she has had one panic attack, significantly decreased from what it was prior. Anxiety initially started in nursing school, has since completed one degree and going to the next, states this has also helped with anxiety.   Denies SI/HI.      Allergies are controlled with daily antihistamine. Insurance no longer covers levocetrizine, she would like to go back to cetrizine at this time.   Objective     Objective   Vital Signs:   /83   Pulse 90   Temp 98.1 °F (36.7 °C)   Resp 16   Ht 165.1 cm (65\")   Wt 93 kg (205 lb)   SpO2 98%   BMI 34.11 kg/m²     Physical Exam  Vitals and nursing note reviewed.   Constitutional:       Appearance: She is obese.   Neck:      Vascular: No carotid bruit.   Cardiovascular:      Rate and Rhythm: Normal rate and regular rhythm.      Pulses: Normal pulses.      Heart sounds: Normal heart sounds. No murmur heard.     No gallop.   Pulmonary:      Effort: Pulmonary effort is normal. No respiratory distress.      Breath sounds: Normal breath sounds. No wheezing or rhonchi.   Abdominal:      General: Bowel sounds are normal. There is no distension.      Palpations: Abdomen is soft.      Tenderness: There is no abdominal tenderness.   Lymphadenopathy:      Cervical: No cervical adenopathy.   Skin:     General: Skin is warm and dry.      Coloration: Skin is not pale.      Findings: No rash.   Neurological:      General: No " focal deficit present.      Mental Status: She is alert and oriented to person, place, and time.   Psychiatric:         Mood and Affect: Mood normal.         Behavior: Behavior normal.          Assessment / Plan      Assessment & Plan   Problem List Items Addressed This Visit          Mental Health    Depression with anxiety    Relevant Medications    desvenlafaxine (Pristiq) 100 MG 24 hr tablet    Stable. Continue Hydroxyzine and desvenlafaxine. Encouraged sun exposure, healthy habits, and daily mindfulness to include something stress relieving.      Other Visit Diagnoses         Allergic rhinitis due to other allergic trigger, unspecified seasonality        Relevant Medications    ibuprofen (ADVIL,MOTRIN) 800 MG tablet    montelukast (SINGULAIR) 10 MG tablet    Stable. Cetrizine 10mg at this time due to insurance coverage. Continue medications as prescribed.      Follow Up   Return in about 6 months (around 9/11/2025) for Annual physical.  Patient was given instructions and counseling regarding her condition or for health maintenance advice. Please see specific information pulled into the AVS if appropriate.     BRAULIO Soto  [facility] - [place]

## (undated) DEVICE — ENSEAL X1 TISSUE SEALER, CURVED JAW, 45 CM SHAFT LENGTH: Brand: ENSEAL

## (undated) DEVICE — Device: Brand: DEFENDO AIR/WATER/SUCTION AND BIOPSY VALVE

## (undated) DEVICE — ENDOPATH 5MM ENDOSCOPIC BLUNT TIP DISSECTORS (12 POUCHES CONTAINING 3 DISSECTORS EACH): Brand: ENDOPATH

## (undated) DEVICE — GOWN,NON-REINFORCED,SIRUS,SET IN SLV,XXL: Brand: MEDLINE

## (undated) DEVICE — INTENDED USE FOR SURGICAL MARKING ON INTACT SKIN, ALSO PROVIDES A PERMANENT METHOD OF IDENTIFYING OBJECTS IN THE OPERATING ROOM: Brand: WRITESITE® REGULAR TIP SKIN MARKER

## (undated) DEVICE — APPL CHLORAPREP TINTED 26ML TEAL

## (undated) DEVICE — BLANKT WARM UPPR/BDY ARM/OUT 57X196CM

## (undated) DEVICE — ENDOPATH XCEL UNIVERSAL TROCAR STABLILITY SLEEVES: Brand: ENDOPATH XCEL

## (undated) DEVICE — PATIENT RETURN ELECTRODE, SINGLE-USE, CONTACT QUALITY MONITORING, ADULT, WITH 9FT CORD, FOR PATIENTS WEIGING OVER 33LBS. (15KG): Brand: MEGADYNE

## (undated) DEVICE — GLV SURG SENSICARE PI MIC PF SZ9 LF STRL

## (undated) DEVICE — UNDRPD COMFRT GLD DRYPAD 36X57IN

## (undated) DEVICE — DEV WARMR SCPE LIQUIDSCOPEWARMOR 2BUTN SHT NON/DEHP/ALC STRL

## (undated) DEVICE — ENDOPATH XCEL BLADELESS TROCARS WITH STABILITY SLEEVES: Brand: ENDOPATH XCEL

## (undated) DEVICE — TROCAR: Brand: KII FIOS FIRST ENTRY

## (undated) DEVICE — CONTN GRAD MEAS TRIANG 32OZ BLK

## (undated) DEVICE — TROC STANDARDTROCAR FOR/TITANSGS 19MM DISP STRL

## (undated) DEVICE — Device: Brand: STANDARD BOUGIE, 38FR

## (undated) DEVICE — ANTIBACTERIAL VIOLET BRAIDED (POLYGLACTIN 910), SYNTHETIC ABSORBABLE SUTURE: Brand: COATED VICRYL

## (undated) DEVICE — [HIGH FLOW INSUFFLATOR,  DO NOT USE IF PACKAGE IS DAMAGED,  KEEP DRY,  KEEP AWAY FROM SUNLIGHT,  PROTECT FROM HEAT AND RADIOACTIVE SOURCES.]: Brand: PNEUMOSURE

## (undated) DEVICE — SHT AIR TRANSFR COMFRT GLIDE LAT 40X80IN

## (undated) DEVICE — LAPAROSCOPIC SMOKE FILTRATION SYSTEM: Brand: PALL LAPAROSHIELD® PLUS LAPAROSCOPIC SMOKE FILTRATION SYSTEM

## (undated) DEVICE — PK BARIATRIC 10

## (undated) DEVICE — KT CLN CLEANOR SCPE

## (undated) DEVICE — GLV SURG SENSICARE PI MIC PF SZ8.5 LF STRL

## (undated) DEVICE — APL DUPLOSPRAYER MIS 40CM

## (undated) DEVICE — PENROSE DRAIN 18 X .5" SILICONE: Brand: MEDLINE